# Patient Record
Sex: FEMALE | Race: WHITE | NOT HISPANIC OR LATINO | Employment: FULL TIME | URBAN - METROPOLITAN AREA
[De-identification: names, ages, dates, MRNs, and addresses within clinical notes are randomized per-mention and may not be internally consistent; named-entity substitution may affect disease eponyms.]

---

## 2018-01-02 ENCOUNTER — ALLSCRIPTS OFFICE VISIT (OUTPATIENT)
Dept: OTHER | Facility: OTHER | Age: 38
End: 2018-01-02

## 2018-01-03 NOTE — PROGRESS NOTES
Assessment   1  Encounter for routine gynecological examination (V72 31) (Z01 419)    Plan   Encounter for routine gynecological examination    · Follow-up visit in 1 year Evaluation and Treatment  Follow-up  Status: Hold For -    Scheduling  Requested for: 25GCY6451   Ordered; For: Encounter for routine gynecological examination; Ordered By: Brenda Rowell Performed:  Due: 81QPR3311    Discussion/Summary      #1  Yearly examâPap smear deferred, self breast awareness reviewed Contraceptionâpatient is status post Suzanne IUD, placed January 2015 by another gynecologist  She is very happy with this device, with only light spaced out menstrual periods  She would like to continue with IUD contraception  The Marquise Preston is due to be removed January 2018  She is interested in [place] IUD  She was counseled in detail about this  Of note, the IUD string was thought to be found in the endocervix but was difficult to visualize  She was counseled in detail about this  This may require some manipulation to remove the Suzanne before [place] is inserted  We will check for insurance company coverage and inform the patient of the findings and arrange for insertion in the near future  She was counseled to take ibuprofen prior to the procedure  History of oligomenorrhea-the patient notes light scattered menses on Suzanne  She will call or return with any menometrorrhagia  History of LEEP cone biopsy-done in 2004 for severe dysplasia  Pap with HPV testing was done December 2016 with negative findings  Pap was deferred with patient consent as per current guidelines  will follow-up in the near future for IUD removal/Re insertion  Otherwise, follow-up 1 year for yearly exam     The patient has the current Goals: Reviewed  The patent has the current Barriers: None  Patient is able to Self-Care  PATIENT EDUCATION RECORD She was given the following educational materials: [place] information       Possible side effects of new medications were reviewed with the patient/guardian today  The treatment plan was reviewed with the patient/guardian  The patient/guardian understands and agrees with the treatment plan      Chief Complaint   1  Visit For: Preventive General Multisystem Exam    History of Present Illness   HPI: Patient was seen today for yearly exam  Please see assessment and plan and discussion for details  Review of Systems        Constitutional: No fever, no chills, feels well, no tiredness, no recent weight gain or loss  ENT: no ear ache, no loss of hearing, no nosebleeds or nasal discharge, no sore throat or hoarseness  Cardiovascular: no complaints of slow or fast heart rate, no chest pain, no palpitations, no leg claudication or lower extremity edema  Respiratory: no complaints of shortness of breath, no wheezing, no dyspnea on exertion, no orthopnea or PND  Breasts: no complaints of breast pain, breast lump or nipple discharge  Gastrointestinal: no complaints of abdominal pain, no constipation, no nausea or diarrhea, no vomiting, no bloody stools  Genitourinary: no complaints of dysuria, no incontinence, no pelvic pain, no dysmenorrhea, no vaginal discharge or abnormal vaginal bleeding  Musculoskeletal: no complaints of arthralgia, no myalgia, no joint swelling or stiffness, no limb pain or swelling  Integumentary: no complaints of skin rash or lesion, no itching or dry skin, no skin wounds  Neurological: no complaints of headache, no confusion, no numbness or tingling, no dizziness or fainting  ROS reviewed  Active Problems   1  Acute pharyngitis (462) (J02 9)   2  Acute streptococcal pharyngitis (034 0) (J02 0)   3  Asthma (493 90) (J45 909)   4  Cervical cancer screening (V76 2) (Z12 4)   5  Encounter for routine gynecological examination with Papanicolaou smear of cervix     (V72 31,V76 2) (Z01 419)   6  History of allergy (V15 09) (Z88 9)   7   Denied: History of Patient Education - Self-Examination Of Breasts   8  Screening for HPV (human papillomavirus) (V73 81) (Z11 51)    Past Medical History    · History of Back Pain   · History of SANDEEP III (cervical intraepithelial neoplasia grade III) with severe dysplasia    (233 1) (D06 9)   · History of Encounter for screening for malignant neoplasm of cervix (V76 2) (Z12 4)   · History of Gestational Diabetes Mellitus - Antepartum Condition Or Prior Complicated    Delivery (648 83)   · History of  1 (V22 0) (Z34 00)   · History of allergic rhinitis (V12 69) (Z87 09)   · History of allergy (V15 09) (Z88 9)   · History of anemia (V12 3) (Z86 2)   · History of bronchitis (V12 69) (Z87 09)   · History of eczema (V13 3) (Z87 2)   · History of varicella (V12 09) (Z86 19)   · History of vertigo (V12 49) (Z87 898)   · History of Impacted cerumen of left ear (380 4) (H61 22)   · History of Impacted cerumen of right ear (380 4) (H61 21)   · History of Lump of skin (782 2) (R22 9)   · History of Need for prophylactic vaccination and inoculation against influenza (V04 81)    (Z23)   · History of Oligomenorrhea (626 1) (N91 5)   · History of Vaginal candidiasis (112 1) (B37 3)     The active problems and past medical history were reviewed and updated today  Surgical History    · History of Cervical Loop Electrosurgical Excision (LEEP)   · History of  Section Low Transverse   · History of Colposcopy Cervix With Biopsy(S) With Endocervical Curettage   · History of Foot Surgery   · History of Oral Surgery Tooth Extraction   · Denied: History of Patient Education - Self-Examination Of Breasts     The surgical history was reviewed and updated today         Family History   Father    · Family history of Hypertension (V17 49)   · Family history of Renal Failure  Maternal Grandmother    · Family history of Hypertension (V17 49)   · Family history of Lung Cancer (V16 1)  Paternal Grandmother    · Family history of Lung Cancer (V16 1)  Maternal Grandfather    · Family history of Bladder Cancer (V16 52)   · Family history of Hypertension (V17 49)  Paternal Grandfather    · Family history of Heart Disease (V17 49)  Family History    · Family history of Breast Cancer (V16 3)   · Family history of Cervical Cancer     The family history was reviewed and updated today  Social History    · Alcohol Use (History)   · Birth Control Method - Oral Contraceptives   · Caffeine Use   · Denied: History of Drug Use   · Marital History - Currently    · Never A Smoker   · One child   · [facility] ([address] [place] [address])   · Uses Safety Equipment - Seatbelts  The social history was reviewed and updated today  The social history was reviewed and is unchanged  Current Meds    1  Claritin TABS; Therapy: (Recorded:93Mgd4201) to Recorded   2  Nasonex 50 MCG/ACT Nasal Suspension; USE 2 SPRAYS IN EACH NOSTRIL ONCE     DAILY; Therapy: 34MZK3649 to (Evaluate:96Ebg3162)  Requested for: 00JQP3587; Last     Rx:17Jan2014 Ordered   3  Suzanne 13 5 MG Intrauterine Intrauterine Device; Therapy: (Recorded:50Roz5141) to Recorded   4  Ventolin  (90 Base) MCG/ACT Inhalation Aerosol Solution; INHALE 2 PUFFS     EVERY 4-6 HOURS AS NEEDED; Therapy: 89HKE0577 to ([facility])  Requested for: 41XPO8973; Last     Rx:10Mar2014 Ordered    Allergies   1  No Known Drug Allergies  2  No Known Food Allergies   3  Seasonal    Vitals    Recorded: 95ZOL8631 63:48BF   Systolic 102, LUE, Sitting   Diastolic 74, LUE, Sitting   Height 5 ft 4 5 in   Weight 181 lb    BMI Calculated 30 59   BSA Calculated 1 89   LMP IUD     Physical Exam        Constitutional      General appearance: No acute distress, well appearing and well nourished  Neck      Neck: Normal, supple, trachea midline, no masses  Thyroid: Normal, no thyromegaly  Genitourinary      External genitalia: Normal and no lesions appreciated         Vagina: Normal, no lesions or dryness appreciated  Urethra: Normal        Urethral meatus: Normal        Bladder: Normal, soft, non-tender and no prolapse or masses appreciated  Cervix: Normal, no palpable masses  -- Without lesions or discharge or cervicitis  No CMT IUD string barely seen in the endocervix  Uterus: Normal, non-tender, not enlarged, and no palpable masses  -- Mid position, top normal size, nontender, without mass  Adnexa/parametria: Normal, non-tender and no fullness or masses appreciated  Anus, perineum, and rectum: Normal sphincter tone, no masses, and no prolapse  Chest      Breasts: Normal and no dimpling or skin changes noted  Abdomen      Abdomen: Normal, non-tender, and no organomegaly noted  Liver and spleen: No hepatomegaly or splenomegaly  Examination for hernias: No hernias appreciated  Lymphatic      Palpation of lymph nodes in neck, axillae, groin and/or other locations: No lymphadenopathy or masses noted  Skin      Skin and subcutaneous tissue: Normal skin turgor and no rashes         Palpation of skin and subcutaneous tissue: Normal        Psychiatric      Orientation to person, place, and time: Normal        Mood and affect: Normal        Signatures    Electronically signed by : LYDIA Bustos ; Jan 2 2018  5:03PM EST                       (Author)

## 2018-01-23 VITALS
DIASTOLIC BLOOD PRESSURE: 74 MMHG | HEIGHT: 65 IN | WEIGHT: 181 LBS | BODY MASS INDEX: 30.16 KG/M2 | SYSTOLIC BLOOD PRESSURE: 122 MMHG

## 2018-02-23 ENCOUNTER — PROCEDURE VISIT (OUTPATIENT)
Dept: OBGYN CLINIC | Facility: CLINIC | Age: 38
End: 2018-02-23
Payer: COMMERCIAL

## 2018-02-23 ENCOUNTER — TELEPHONE (OUTPATIENT)
Dept: OBGYN CLINIC | Facility: CLINIC | Age: 38
End: 2018-02-23

## 2018-02-23 VITALS
HEIGHT: 65 IN | WEIGHT: 176.8 LBS | BODY MASS INDEX: 29.46 KG/M2 | DIASTOLIC BLOOD PRESSURE: 60 MMHG | SYSTOLIC BLOOD PRESSURE: 112 MMHG

## 2018-02-23 DIAGNOSIS — Z30.430 ENCOUNTER FOR INSERTION OF MIRENA IUD: ICD-10-CM

## 2018-02-23 DIAGNOSIS — Z30.433 ENCOUNTER FOR REMOVAL AND REINSERTION OF INTRAUTERINE CONTRACEPTIVE DEVICE (IUD): Primary | ICD-10-CM

## 2018-02-23 LAB — SL AMB POCT URINE HCG: NEGATIVE

## 2018-02-23 PROCEDURE — 81025 URINE PREGNANCY TEST: CPT | Performed by: OBSTETRICS & GYNECOLOGY

## 2018-02-23 PROCEDURE — 58301 REMOVE INTRAUTERINE DEVICE: CPT | Performed by: OBSTETRICS & GYNECOLOGY

## 2018-02-23 PROCEDURE — 58300 INSERT INTRAUTERINE DEVICE: CPT | Performed by: OBSTETRICS & GYNECOLOGY

## 2018-02-23 RX ORDER — LORATADINE 10 MG/1
TABLET ORAL
COMMUNITY
End: 2020-08-07 | Stop reason: ALTCHOICE

## 2018-02-23 RX ORDER — FLUTICASONE PROPIONATE 50 MCG
1 SPRAY, SUSPENSION (ML) NASAL DAILY
COMMUNITY

## 2018-02-23 RX ORDER — MOMETASONE FUROATE 50 UG/1
2 SPRAY, METERED NASAL DAILY
COMMUNITY
Start: 2012-12-26 | End: 2019-09-05 | Stop reason: SDUPTHER

## 2018-02-23 NOTE — PROGRESS NOTES
Assessment/Plan:   1  Alexis removal with Cisneros Line Re insertion-done without problems  Patient tolerated the procedure well  Urine pregnancy test was negative prior to the procedure  Pelvic rest was recommended for the next 7 days  She will follow up in 1 month time for IUD check  Diagnoses and all orders for this visit:    Encounter for removal and reinsertion of intrauterine contraceptive device (IUD)  -     levonorgestrel (KYLEENA) 19 5 MG intrauterine device; 1 Intra Uterine Device by Intrauterine route once for 1 dose  -     Iud removal    Encounter for insertion of mirena IUD  -     POCT urine HCG  -     levonorgestrel (KYLEENA) 19 5 MG intrauterine device; 1 Intra Uterine Device by Intrauterine route once for 1 dose  -     Iud insertions    Other orders  -     loratadine (CLARITIN) 10 mg tablet; Take by mouth  -     mometasone (NASONEX) 50 mcg/act nasal spray; 2 sprays into each nostril daily  -     Discontinue: Levonorgestrel (ALEXIS) 13 5 MG IUD; by Intrauterine route  -     fluticasone (FLONASE) 50 mcg/act nasal spray; 1 spray into each nostril daily  -     dextromethorphan-guaifenesin (MUCINEX DM)  MG per 12 hr tablet; Take 1 tablet by mouth every 12 (twelve) hours  -     Cancel: levonorgestrel [facility]) intrauterine device IUD 1 Intra Uterine Device; 1 Intra Uterine Device by Intrauterine route once           Subjective:     Patient ID: Gagandeep Su is a 43 y o  female  Patient was seen today for IUD removal with Re insertion  She has  Alexis IUD and wishes to have Cisneros Line placed  Review of Systems   Constitutional: Negative for chills, diaphoresis, fatigue and fever  Respiratory: Negative for apnea, cough, chest tightness, shortness of breath and wheezing  Cardiovascular: Negative for chest pain, palpitations and leg swelling  Gastrointestinal: Negative for abdominal distention, abdominal pain, anal bleeding, constipation, diarrhea, nausea, rectal pain and vomiting  Genitourinary: Negative for difficulty urinating, dyspareunia, dysuria, frequency, hematuria, menstrual problem, pelvic pain, urgency, vaginal bleeding, vaginal discharge and vaginal pain  Musculoskeletal: Negative for arthralgias, back pain and myalgias  Skin: Negative for color change and rash  Neurological: Negative for dizziness, syncope, light-headedness, numbness and headaches  Hematological: Negative for adenopathy  Does not bruise/bleed easily  Psychiatric/Behavioral: Negative for dysphoric mood and sleep disturbance  The patient is not nervous/anxious  Objective:     Physical Exam    Objective      /60 (BP Location: Right arm, Patient Position: Sitting, Cuff Size: Large)   Ht 5' 5" (1 651 m)   Wt 80 2 kg (176 lb 12 8 oz)   BMI 29 42 kg/m²     General:   alert and oriented, in no acute distress, alert, appears stated age and cooperative   Neck:    Breast:    Heart:    Lungs:    Abdomen: soft, non-tender, without masses or organomegaly   Vulva: normal   Vagina: Without lesionsOr discharge noted   Cervix: Without lesions or discharge or cervicitis    No Cervical motion tenderness   Uterus: top normal size, anteverted   Adnexa: normal adnexa   Rectum:

## 2018-02-23 NOTE — PROGRESS NOTES
Iud insertions  Date/Time: 2/23/2018 11:32 AM  Performed by: Martina Kyle  Authorized by: Martina Kyle     Consent:     Consent obtained:  Verbal and written    Consent given by:  Patient    Procedure risks and benefits discussed: yes      Patient questions answered: yes      Patient agrees, verbalizes understanding, and wants to proceed: yes      Educational handouts given: yes      Instructions and paperwork completed: yes    Procedure:     Pelvic exam performed: yes      Negative urine pregnancy test: yes      Cervix cleaned and prepped: yes      Speculum placed in vagina: yes      Tenaculum applied to cervix: yes      IUD inserted with no complications: yes      Strings trimmed: yes      Uterus sounded to confirm IUD placement: yes      Uterus sound depth (cm):  8  Post-procedure:     Patient tolerated procedure well: yes      Patient will follow up after next period: yes    Comments:      Meghann Tripp IUD inserted without problems  IUD string cut to a length of 2 5-3 cm     On exam, cervix was without lesions or discharge  Vagina without lesions or discharge  Uterus was anteverted, top-normal size, nontender, without mass  Adnexa without masses or tenderness    Rectal exam was deferred

## 2018-02-23 NOTE — PROGRESS NOTES
Iud removal  Date/Time: 2018 11:31 AM  Performed by: Lu zElena Xavier  Authorized by: Luz Elena Xavier     Consent:     Consent obtained:  Verbal and written    Consent given by:  Patient    Procedure risks and benefits discussed: yes      Patient questions answered: yes      Patient agrees, verbalizes understanding, and wants to proceed: yes      Educational handouts given: yes      Instructions and paperwork completed: yes    Procedure:     Removed with no complications: yes      Other reason for removal:   IUD

## 2018-03-23 ENCOUNTER — OFFICE VISIT (OUTPATIENT)
Dept: OBGYN CLINIC | Facility: CLINIC | Age: 38
End: 2018-03-23
Payer: COMMERCIAL

## 2018-03-23 VITALS
HEIGHT: 64 IN | BODY MASS INDEX: 29.84 KG/M2 | DIASTOLIC BLOOD PRESSURE: 76 MMHG | WEIGHT: 174.8 LBS | SYSTOLIC BLOOD PRESSURE: 118 MMHG

## 2018-03-23 DIAGNOSIS — N92.6 IRREGULAR MENSES: Primary | ICD-10-CM

## 2018-03-23 DIAGNOSIS — Z97.5 IUD CONTRACEPTION: ICD-10-CM

## 2018-03-23 PROBLEM — H61.23 HEARING LOSS SECONDARY TO CERUMEN IMPACTION, BILATERAL: Status: ACTIVE | Noted: 2017-08-08

## 2018-03-23 PROBLEM — H61.23 HEARING LOSS SECONDARY TO CERUMEN IMPACTION, BILATERAL: Status: RESOLVED | Noted: 2017-08-08 | Resolved: 2018-03-23

## 2018-03-23 PROCEDURE — 99213 OFFICE O/P EST LOW 20 MIN: CPT | Performed by: OBSTETRICS & GYNECOLOGY

## 2018-03-23 NOTE — PROGRESS NOTES
Assessment/Plan:  1  IUD check-Kyleena was placed  Patient had dizziness and lethargy for 1-2 weeks but this has resolved  She will call or return with any recurrent symptoms  Of note, Katherine Lincoln was removed at the previous visit  2   History of oligomenorrhea-this may persist on [place]  She will call with any issues  3   History of abnormal Pap-patient status post LEEP cone biopsy for severe dysplasia in 2004  Most recent Pap December 2016 was negative with negative HPV  4   Transient irregular menses-likely related to recent IUD removal with Re insertion  She will call with any menometrorrhagia  She will follow-up in December 2018 for yearly exam or as needed  No problem-specific Assessment & Plan notes found for this encounter  Diagnoses and all orders for this visit:    Irregular menses    IUD contraception    Other orders  -     levonorgestrel (KYLEENA) 19 5 MG intrauterine device; 1 Intra Uterine Device by Intrauterine route once          Subjective:      Patient ID: Xiao Mahmood is a 43 y o  female  Patient was seen today for IUD check  She denies any complaints  She had some irregular bleeding when it was initially inserted and some dizziness and lethargy for the 1st week or 2 but this has all resolved  The following portions of the patient's history were reviewed and updated as appropriate: allergies, current medications, past family history, past medical history, past social history, past surgical history and problem list     Review of Systems   Constitutional: Negative for chills, diaphoresis, fatigue and fever  Respiratory: Negative for apnea, cough, chest tightness, shortness of breath and wheezing  Cardiovascular: Negative for chest pain, palpitations and leg swelling  Gastrointestinal: Negative for abdominal distention, abdominal pain, anal bleeding, constipation, diarrhea, nausea, rectal pain and vomiting     Genitourinary: Negative for difficulty urinating, dyspareunia, dysuria, frequency, hematuria, menstrual problem, pelvic pain, urgency, vaginal bleeding, vaginal discharge and vaginal pain  Musculoskeletal: Negative for arthralgias, back pain and myalgias  Skin: Negative for color change and rash  Neurological: Negative for dizziness, syncope, light-headedness, numbness and headaches  Hematological: Negative for adenopathy  Does not bruise/bleed easily  Psychiatric/Behavioral: Negative for dysphoric mood and sleep disturbance  The patient is not nervous/anxious  Objective:      /76 (BP Location: Right arm, Patient Position: Sitting, Cuff Size: Standard)    5' 4 07" (1 628 m)   Wt 79 3 kg (174 lb 12 8 oz)   BMI 29 93 kg/m²          Physical Exam    Objective      /76 (BP Location: Right arm, Patient Position: Sitting, Cuff Size: Standard)   Ht 5' 4 07" (1 628 m)   Wt 79 3 kg (174 lb 12 8 oz)   BMI 29 93 kg/m²     General:   alert and oriented, in no acute distress, alert, appears stated age and cooperative   Neck:    Breast:    Heart:    Lungs:    Abdomen: soft, non-tender, without masses or organomegaly   Vulva: normal   Vagina: Without lesions or discharge noted  Cervix: Without lesions or discharge or cervicitis  No Cervical motion tenderness    IUD string seen at a length of 3 cm   Uterus: top normal size, anteverted, non-tender   Adnexa: no mass, fullness, tenderness   Rectum: deferred

## 2019-01-21 ENCOUNTER — ANNUAL EXAM (OUTPATIENT)
Dept: OBGYN CLINIC | Facility: CLINIC | Age: 39
End: 2019-01-21
Payer: COMMERCIAL

## 2019-01-21 VITALS
HEIGHT: 65 IN | BODY MASS INDEX: 30.16 KG/M2 | SYSTOLIC BLOOD PRESSURE: 118 MMHG | DIASTOLIC BLOOD PRESSURE: 72 MMHG | WEIGHT: 181 LBS

## 2019-01-21 DIAGNOSIS — Z01.419 WOMEN'S ANNUAL ROUTINE GYNECOLOGICAL EXAMINATION: Primary | ICD-10-CM

## 2019-01-21 DIAGNOSIS — Z12.31 VISIT FOR SCREENING MAMMOGRAM: ICD-10-CM

## 2019-01-21 PROBLEM — M54.16 LUMBAR RADICULOPATHY, CHRONIC: Status: ACTIVE | Noted: 2018-07-26

## 2019-01-21 PROCEDURE — 99395 PREV VISIT EST AGE 18-39: CPT | Performed by: OBSTETRICS & GYNECOLOGY

## 2019-01-21 RX ORDER — NAPROXEN 500 MG/1
TABLET ORAL
COMMUNITY
Start: 2018-08-08 | End: 2019-12-03 | Stop reason: ALTCHOICE

## 2019-01-21 RX ORDER — RANITIDINE HCL 75 MG
75 TABLET ORAL 2 TIMES DAILY
COMMUNITY
End: 2019-12-03 | Stop reason: ALTCHOICE

## 2019-01-21 NOTE — PATIENT INSTRUCTIONS
Intrauterine Device   WHAT YOU NEED TO KNOW:   What is an intrauterine device? An intrauterine device (IUD) is a type of birth control that is inserted into your uterus  It is a small, flexible piece of plastic with a string on the end  It is inserted and removed by your healthcare provider  IUDs prevent sperm from reaching or fertilizing an egg  IUDs also prevent a fertilized egg from attaching to the uterus and developing into a fetus  What are the most common types of IUDs? · Copper: This type of IUD slowly releases a small amount of copper into your uterus  This IUD can remain in place for up to 10 years  · Hormone-releasing: This type of IUD slowly releases a small amount of progesterone into your uterus  Progesterone is a hormone that is made by your body to help control your periods  This IUD can remain in place for up to 5 years  What are the advantages of an IUD? · Effective: An IUD is 98% to 99% effective in preventing pregnancy  · Easily removable: The IUD can be removed if you decide to have a baby  You may be able to get pregnant as soon as the IUD is removed  · Immediate:  An IUD protects you from pregnancy right after it is inserted  · Convenient:  You do not have to stop sexual activity to insert it or worry about remembering to take your birth control pill  · Safe:  Copper IUDs are safer for some women than oral birth control pills  Examples include women who smoke or have a history of blood clots  · Health effects:  Hormone-releasing IUDs may decrease certain health problems  Examples include bleeding and cramping that happen with your monthly period  What are the risks of an IUD? · An IUD does not protect you from sexually transmitted infections  You may have cramps during the first weeks after you get the IUD  A copper IUD may cause your monthly period to be heavier or more painful  This is more common within the first 3 months after you get the IUD   You may need to have your IUD removed if your bleeding or pain becomes severe  You may have spotting between periods  · There is a small chance that you could get pregnant  Sometimes the IUD cannot be removed after you get pregnant  This increases your risk of a miscarriage or an ectopic pregnancy  Ectopic pregnancy is when the fertilized egg starts to grow somewhere other than your uterus  There is a small risk of an infection within the first 20 days after the IUD is placed  Infection can lead to pelvic inflammatory disease  This can cause infertility  Your uterus may tear when the IUD is inserted  The IUD may slip part or all of the way out of your uterus  How is the IUD inserted? · The IUD is usually inserted during your monthly period  This may help decrease the amount of discomfort you have during the procedure  It also helps ensure that you are not pregnant  You will be asked to lie down and place your feet in stirrups  Your healthcare provider will gently insert a speculum into your vagina  This is the same tool used during a pap smear  The speculum allows your healthcare provider to see inside your vagina to your cervix  The cervix is the opening of your uterus  · Your healthcare provider will clean your cervix with an antiseptic solution to prevent infection  You may be given numbing medicine  A long plastic tube is gently passed through your cervix and into your uterus  This tube has the IUD inside of it  The IUD is pushed out of the tube and into your uterus  You may have cramps as the IUD is inserted  The tube is removed after the IUD is in place  How can I make sure my IUD is still in place? An IUD has a string made of plastic thread  One to 2 inches of this string hangs into your vagina  You cannot see this string, and it will not cause problems when you have sex  Check your IUD string every 3 days for the first 3 months after it is inserted  After that, check the string after each monthly period   Do the following to check the placement of your IUD:  · Wash your hands with soap and warm water  Dry them with a clean towel  · Bend your knees and squat low to the ground  · Gently put your index finger inside your vagina  The cervix is at the top of the vagina and feels like the tip of your nose  Feel for the IUD string  Do not pull on the string  You should not be able to feel the firm plastic of the IUD itself  · Wash your hands after you check your IUD string  Where can I find more information? · [facility] [address] , [address] [place]  Phone: 4- 965 - [phone]  Web Address: https://[employer]/  org  When should I contact my healthcare provider? · You think you are pregnant  · You bleed after you have sex  · You have pain during sex  · You cannot feel the IUD string, the string feels longer, or you feel the plastic of the IUD itself  · You have vaginal discharge that is green, yellow, or has a foul odor  · You have questions or concerns about your condition or care  When should I seek immediate care? · You have severe pain or bleeding during your period  · You have a fever and severe abdominal pain  CARE AGREEMENT:   You have the right to help plan your care  Learn about your health condition and how it may be treated  Discuss treatment options with your caregivers to decide what care you want to receive  You always have the right to refuse treatment  The above information is an  only  It is not intended as medical advice for individual conditions or treatments  Talk to your doctor, nurse or pharmacist before following any medical regimen to see if it is safe and effective for you  © 2017 [employer] Information is for End User's use only and may not be sold, redistributed or otherwise used for commercial purposes   All illustrations and images included in CareNotes® are the copyrighted property of A D A M , Inc  or Lester Ortiz

## 2019-01-21 NOTE — PROGRESS NOTES
Assessment/Plan   Diagnoses and all orders for this visit:    Women's annual routine gynecological examination    Visit for screening mammogram  -     Mammo screening bilateral w cad; Future    Other orders  -     naproxen (NAPROSYN) 500 mg tablet; TAKE 1 TABLET (500 MG TOTAL) BY MOUTH 2 (TWO) TIMES A DAY WITH MEALS  TAKE WITH MEALS  -     ranitidine (ZANTAC) 75 MG tablet; Take 75 mg by mouth 2 (two) times a day     1  Yearly exam-Pap smear deferred, self-breast awareness reviewed, calcium/vitamin-D recommendations reviewed, mammogram request given postdated 2/8/19  2  Jimy Shutters good position on the exam   Patient does not check for the IUD string  She will call with any issues  Of note, IUD string seen at a length of 1-1 5 cm   3  Oligomenorrhea/amenorrhea-patient notes rare menstrual flow on the IUD  4  History of abnormal Pap-status post LEEP cone biopsy for severe dysplasia in 2004  Pap with HPV was negative December 2016  Pap was deferred today with plans to have it done at the next visit January 2020  She will follow-up in 1 year for yearly exam with Pap/HPV or as needed  Subjective   Patient ID: Brooke Ga is a 43 y o  female  Vitals:    01/21/19 1626   BP: 118/72     Patient was seen today for yearly exam   She denies any complaints  The following portions of the patient's history were reviewed and updated as appropriate: allergies, current medications, past family history, past medical history, past social history, past surgical history and problem list   Past Medical History:   Diagnosis Date    Abnormal Pap smear of cervix     Allergic rhinitis     last assessed-12/31/2012    Anemia     SANDEEP III (cervical intraepithelial neoplasia grade III) with severe dysplasia     LEEP cone biopsy for SANDEEP-3 2/25/04    Done at the hospital    Gestational diabetes     antepartum condition or prior complicated delivery    Impacted cerumen of both ears     last assessed-6/13/2012    Oligomenorrhea     Vaginal candidiasis     last assessed-2013    Varicella     chickenpox as a child    Vertigo     last assessed-2013     Past Surgical History:   Procedure Laterality Date    CERVICAL BIOPSY  W/ LOOP ELECTRODE EXCISION  2004    Pathololgy with SANDEEP-2 to 3, with free margins but dysplasia extended to within 1mm of endocervical margin  2004     SECTION, LOW TRANSVERSE  2009    Primary low transverse  for arrest in the active phase at 4 cm with unengaged vertex despite hours of labor    COLPOSCOPY W/ BIOPSY / CURETTAGE  2003    FOOT SURGERY Right     TOOTH EXTRACTION       OB History    Para Term  AB Living   1 1 1     1   SAB TAB Ectopic Multiple Live Births           1      # Outcome Date GA Lbr Gregory/2nd Weight Sex Delivery Anes PTL Lv   1 Term               Obstetric Comments    1       Current Outpatient Prescriptions:     dextromethorphan-guaifenesin (MUCINEX DM)  MG per 12 hr tablet, Take 1 tablet by mouth every 12 (twelve) hours, Disp: , Rfl:     fluticasone (FLONASE) 50 mcg/act nasal spray, 1 spray into each nostril daily, Disp: , Rfl:     levonorgestrel (KYLEENA) 19 5 MG intrauterine device, 1 Intra Uterine Device by Intrauterine route once, Disp: , Rfl:     loratadine (CLARITIN) 10 mg tablet, Take by mouth, Disp: , Rfl:     mometasone (NASONEX) 50 mcg/act nasal spray, 2 sprays into each nostril daily, Disp: , Rfl:     naproxen (NAPROSYN) 500 mg tablet, TAKE 1 TABLET (500 MG TOTAL) BY MOUTH 2 (TWO) TIMES A DAY WITH MEALS   TAKE WITH MEALS , Disp: , Rfl:     ranitidine (ZANTAC) 75 MG tablet, Take 75 mg by mouth 2 (two) times a day, Disp: , Rfl:     levonorgestrel (KYLEENA) 19 5 MG intrauterine device, 1 Intra Uterine Device by Intrauterine route once for 1 dose, Disp: 1 Intra Uterine Device, Rfl: 0  Allergies   Allergen Reactions    Other      Social History     Social History    Marital status: /Civil [employer]     Spouse name: N/A    Number of children: 1    Years of education: N/A     Social History Main Topics    Smoking status: Never Smoker    Smokeless tobacco: Never Used    Alcohol use Yes    Drug use: No    Sexual activity: Yes     Partners: Male     Birth control/ protection: IUD     Other Topics Concern    None     Social History Narrative    Caffeine use    Confucianism Affiliation [employer] ([facility])    Uses safety equipment-seatbelts     Family History   Problem Relation Age of Onset    Hypertension Father     Kidney failure Father     Hypertension Maternal Grandmother     Lung cancer Maternal Grandmother     Cancer Maternal Grandfather         bladder    Hypertension Maternal Grandfather     Lung cancer Paternal Grandmother     Heart disease Paternal Grandfather     Breast cancer Family     Cervical cancer Family        Review of Systems   Constitutional: Negative for chills, diaphoresis, fatigue and fever  Respiratory: Negative for apnea, cough, chest tightness, shortness of breath and wheezing  Cardiovascular: Negative for chest pain, palpitations and leg swelling  Gastrointestinal: Negative for abdominal distention, abdominal pain, anal bleeding, constipation, diarrhea, nausea, rectal pain and vomiting  Genitourinary: Negative for difficulty urinating, dyspareunia, dysuria, frequency, hematuria, menstrual problem, pelvic pain, urgency, vaginal bleeding, vaginal discharge and vaginal pain  Musculoskeletal: Negative for arthralgias, back pain and myalgias  Skin: Negative for color change and rash  Neurological: Negative for dizziness, syncope, light-headedness, numbness and headaches  Hematological: Negative for adenopathy  Does not bruise/bleed easily  Psychiatric/Behavioral: Negative for dysphoric mood and sleep disturbance  The patient is not nervous/anxious          Objective   Physical Exam    Objective      /72 (BP Location: Left arm, Patient Position: Sitting, Cuff Size: Standard)   Ht 5' 5" (1 651 m)   Wt 82 1 kg (181 lb)   BMI 30 12 kg/m²     General:   alert and oriented, in no acute distress   Neck: normal to inspection and palpation   Breast: normal appearance, no masses or tenderness   Heart:    Lungs:    Abdomen: soft, non-tender, without masses or organomegaly   Vulva: normal   Vagina: Without erythema or lesions or discharge  Normal   Cervix: Without lesions or discharge or cervicitis  No Cervical motion tenderness  IUD string seen at a length of 1-1 5 cm      Uterus: top normal size, anteverted, non-tender   Adnexa: no mass, fullness, tenderness   Rectum: negative    Psych:  Normal mood and affect   Skin:  Without obvious lesions   Eyes: symmetric, with normal movements and reactivity   Musculoskeletal:  Normal muscle tone and movements appreciated

## 2019-02-22 ENCOUNTER — APPOINTMENT (OUTPATIENT)
Dept: URGENT CARE | Facility: MEDICAL CENTER | Age: 39
End: 2019-02-22

## 2019-02-22 ENCOUNTER — TRANSCRIBE ORDERS (OUTPATIENT)
Dept: URGENT CARE | Facility: MEDICAL CENTER | Age: 39
End: 2019-02-22

## 2019-02-22 ENCOUNTER — TELEPHONE (OUTPATIENT)
Dept: OBGYN CLINIC | Facility: CLINIC | Age: 39
End: 2019-02-22

## 2019-02-22 DIAGNOSIS — Z00.00 PHYSICAL EXAM: Primary | ICD-10-CM

## 2019-02-22 LAB
HBV SURFACE AB SER-ACNC: 27.37 MIU/ML
RUBV IGG SERPL IA-ACNC: >175 IU/ML

## 2019-02-22 PROCEDURE — 86765 RUBEOLA ANTIBODY: CPT | Performed by: FAMILY MEDICINE

## 2019-02-22 PROCEDURE — 86735 MUMPS ANTIBODY: CPT | Performed by: FAMILY MEDICINE

## 2019-02-22 PROCEDURE — 86787 VARICELLA-ZOSTER ANTIBODY: CPT | Performed by: FAMILY MEDICINE

## 2019-02-22 PROCEDURE — 86480 TB TEST CELL IMMUN MEASURE: CPT | Performed by: FAMILY MEDICINE

## 2019-02-22 PROCEDURE — 86706 HEP B SURFACE ANTIBODY: CPT | Performed by: FAMILY MEDICINE

## 2019-02-22 PROCEDURE — 86762 RUBELLA ANTIBODY: CPT | Performed by: FAMILY MEDICINE

## 2019-02-24 LAB — MUV IGG SER QL: NORMAL

## 2019-02-25 LAB
GAMMA INTERFERON BACKGROUND BLD IA-ACNC: 0.02 IU/ML
M TB IFN-G BLD-IMP: NEGATIVE
M TB IFN-G CD4+ BCKGRND COR BLD-ACNC: 0 IU/ML
M TB IFN-G CD4+ BCKGRND COR BLD-ACNC: 0 IU/ML
MITOGEN IGNF BCKGRD COR BLD-ACNC: >10 IU/ML

## 2019-02-26 LAB
MEV IGG SER QL: NORMAL
VZV IGG SER IA-ACNC: NORMAL

## 2019-04-23 ENCOUNTER — TELEPHONE (OUTPATIENT)
Dept: OBGYN CLINIC | Facility: CLINIC | Age: 39
End: 2019-04-23

## 2019-05-10 ENCOUNTER — HOSPITAL ENCOUNTER (OUTPATIENT)
Dept: RADIOLOGY | Age: 39
Discharge: HOME | End: 2019-05-10
Payer: COMMERCIAL

## 2019-05-10 VITALS — HEIGHT: 64 IN | BODY MASS INDEX: 29.88 KG/M2 | WEIGHT: 175 LBS

## 2019-05-10 DIAGNOSIS — Z12.31 VISIT FOR SCREENING MAMMOGRAM: ICD-10-CM

## 2019-05-10 PROCEDURE — 77067 SCR MAMMO BI INCL CAD: CPT

## 2019-05-16 ENCOUNTER — TELEPHONE (OUTPATIENT)
Dept: OBGYN CLINIC | Facility: CLINIC | Age: 39
End: 2019-05-16

## 2019-05-17 DIAGNOSIS — N63.20 BREAST MASS, LEFT: Primary | ICD-10-CM

## 2019-05-28 ENCOUNTER — HOSPITAL ENCOUNTER (OUTPATIENT)
Dept: MAMMOGRAPHY | Facility: CLINIC | Age: 39
Discharge: HOME | End: 2019-05-28
Payer: COMMERCIAL

## 2019-05-28 ENCOUNTER — HOSPITAL ENCOUNTER (OUTPATIENT)
Dept: ULTRASOUND IMAGING | Facility: CLINIC | Age: 39
Discharge: HOME | End: 2019-05-28
Payer: COMMERCIAL

## 2019-05-28 VITALS — WEIGHT: 175 LBS | BODY MASS INDEX: 29.88 KG/M2 | HEIGHT: 64 IN

## 2019-05-28 DIAGNOSIS — N63.20 BREAST MASS, LEFT: ICD-10-CM

## 2019-05-28 PROCEDURE — 77065 DX MAMMO INCL CAD UNI: CPT

## 2019-05-28 PROCEDURE — 76642 ULTRASOUND BREAST LIMITED: CPT

## 2019-05-28 PROCEDURE — G0279 TOMOSYNTHESIS, MAMMO: HCPCS

## 2019-06-14 ENCOUNTER — OFFICE VISIT (OUTPATIENT)
Dept: FAMILY MEDICINE CLINIC | Facility: CLINIC | Age: 39
End: 2019-06-14
Payer: COMMERCIAL

## 2019-06-14 VITALS
DIASTOLIC BLOOD PRESSURE: 70 MMHG | RESPIRATION RATE: 16 BRPM | SYSTOLIC BLOOD PRESSURE: 102 MMHG | HEIGHT: 65 IN | OXYGEN SATURATION: 99 % | WEIGHT: 179 LBS | BODY MASS INDEX: 29.82 KG/M2 | TEMPERATURE: 97.2 F | HEART RATE: 62 BPM

## 2019-06-14 DIAGNOSIS — Z23 NEED FOR TDAP VACCINATION: ICD-10-CM

## 2019-06-14 DIAGNOSIS — Z76.89 ENCOUNTER TO ESTABLISH CARE WITH NEW DOCTOR: ICD-10-CM

## 2019-06-14 DIAGNOSIS — Z00.00 ROUTINE ADULT HEALTH MAINTENANCE: Primary | ICD-10-CM

## 2019-06-14 DIAGNOSIS — M48.061 SPINAL STENOSIS OF LUMBAR REGION WITHOUT NEUROGENIC CLAUDICATION: ICD-10-CM

## 2019-06-14 PROBLEM — N92.6 IRREGULAR MENSES: Status: RESOLVED | Noted: 2018-03-23 | Resolved: 2019-06-14

## 2019-06-14 PROBLEM — M54.16 LUMBAR RADICULOPATHY, CHRONIC: Status: RESOLVED | Noted: 2018-07-26 | Resolved: 2019-06-14

## 2019-06-14 PROCEDURE — 99386 PREV VISIT NEW AGE 40-64: CPT | Performed by: FAMILY MEDICINE

## 2019-06-14 PROCEDURE — 90715 TDAP VACCINE 7 YRS/> IM: CPT

## 2019-06-14 PROCEDURE — 90471 IMMUNIZATION ADMIN: CPT

## 2019-08-03 ENCOUNTER — APPOINTMENT (OUTPATIENT)
Dept: LAB | Facility: MEDICAL CENTER | Age: 39
End: 2019-08-03

## 2019-08-03 ENCOUNTER — TRANSCRIBE ORDERS (OUTPATIENT)
Dept: ADMINISTRATIVE | Facility: HOSPITAL | Age: 39
End: 2019-08-03

## 2019-08-03 DIAGNOSIS — Z00.8 OTHER SPECIFIED GENERAL MEDICAL EXAMINATION: Primary | ICD-10-CM

## 2019-08-03 DIAGNOSIS — Z00.8 OTHER SPECIFIED GENERAL MEDICAL EXAMINATION: ICD-10-CM

## 2019-08-03 LAB
CHOLEST SERPL-MCNC: 144 MG/DL (ref 50–200)
EST. AVERAGE GLUCOSE BLD GHB EST-MCNC: 103 MG/DL
HBA1C MFR BLD: 5.2 % (ref 4.2–6.3)
HDLC SERPL-MCNC: 62 MG/DL (ref 40–60)
LDLC SERPL CALC-MCNC: 73 MG/DL (ref 0–100)
NONHDLC SERPL-MCNC: 82 MG/DL
TRIGL SERPL-MCNC: 44 MG/DL

## 2019-08-03 PROCEDURE — 36415 COLL VENOUS BLD VENIPUNCTURE: CPT | Performed by: PREVENTIVE MEDICINE

## 2019-08-03 PROCEDURE — 80061 LIPID PANEL: CPT

## 2019-08-03 PROCEDURE — 83036 HEMOGLOBIN GLYCOSYLATED A1C: CPT | Performed by: PREVENTIVE MEDICINE

## 2019-09-05 ENCOUNTER — APPOINTMENT (OUTPATIENT)
Dept: RADIOLOGY | Facility: CLINIC | Age: 39
End: 2019-09-05
Payer: COMMERCIAL

## 2019-09-05 ENCOUNTER — OFFICE VISIT (OUTPATIENT)
Dept: OBGYN CLINIC | Facility: CLINIC | Age: 39
End: 2019-09-05
Payer: COMMERCIAL

## 2019-09-05 VITALS
HEART RATE: 66 BPM | HEIGHT: 64 IN | WEIGHT: 177 LBS | BODY MASS INDEX: 30.22 KG/M2 | DIASTOLIC BLOOD PRESSURE: 69 MMHG | SYSTOLIC BLOOD PRESSURE: 102 MMHG

## 2019-09-05 DIAGNOSIS — M79.672 PAIN OF LEFT HEEL: Primary | ICD-10-CM

## 2019-09-05 DIAGNOSIS — M79.672 PAIN OF LEFT HEEL: ICD-10-CM

## 2019-09-05 PROCEDURE — 73650 X-RAY EXAM OF HEEL: CPT

## 2019-09-05 PROCEDURE — 99203 OFFICE O/P NEW LOW 30 MIN: CPT | Performed by: ORTHOPAEDIC SURGERY

## 2019-09-26 ENCOUNTER — OFFICE VISIT (OUTPATIENT)
Dept: OBGYN CLINIC | Facility: CLINIC | Age: 39
End: 2019-09-26
Payer: COMMERCIAL

## 2019-09-26 VITALS
WEIGHT: 177 LBS | SYSTOLIC BLOOD PRESSURE: 112 MMHG | HEART RATE: 65 BPM | HEIGHT: 64 IN | DIASTOLIC BLOOD PRESSURE: 74 MMHG | BODY MASS INDEX: 30.22 KG/M2

## 2019-09-26 DIAGNOSIS — M79.672 PAIN OF LEFT HEEL: Primary | ICD-10-CM

## 2019-09-26 PROCEDURE — 99213 OFFICE O/P EST LOW 20 MIN: CPT | Performed by: ORTHOPAEDIC SURGERY

## 2019-09-26 NOTE — PROGRESS NOTES
Assessment/Plan:  1  Pain of left heel  MRI ankle/heel left  wo contrast       Scribe Attestation    I,:   Khalida Whalen am acting as a scribe while in the presence of the attending physician :        I,:   Barbie Rowland, DO personally performed the services described in this documentation    as scribed in my presence :          Karine has continued left sided heel pain  Due to her continued pain with no improvement of rest I would like to order an MRI of the left heel / ankle to rule out a stress fracture of the heel vs Achilles insertion  I did offer her a Cam walker boot at today's appointment but she would like to continue with the heel cups until we have the results of the MRI  We discussed that she should restrict herself to non-impact exercises  She can use the stationary bike, swim, upper body and core as tolerated  I will call her with the results of the MRI and at that time we will discuss what the next appropriate treatment will be  Subjective:   Lamont Do is a 35 y o  female who returns to the office today for follow-up left heel pain  She was last seen 3 weeks ago where she was suffering from heel pain for about 1 month  She was treated conservatively and given heel cups  She states at today's appointment that her pain has not improved and she has continued pain in her heel and into her Achilles  She has been wearing the heel cups which helped with her daily walking but has not improved her overall pain  She states she has been doing very low impact exercises but was feeling better about a week ago so she tried participating in a grady class  By the end of her Grady class she had significant heel pain  She denies any new injury or trauma to the hell  She denies any radiating pain, numbness or tingling  She has been doing very low impact activity but did tries simple class about a week ago while she was feeling better                                    Review of Systems   Constitutional: Negative for chills, fever and unexpected weight change  HENT: Negative for hearing loss, nosebleeds and sore throat  Eyes: Negative for pain, redness and visual disturbance  Respiratory: Negative for cough, shortness of breath and wheezing  Cardiovascular: Negative for chest pain, palpitations and leg swelling  Gastrointestinal: Negative for abdominal pain, nausea and vomiting  Endocrine: Negative for polydipsia and polyuria  Genitourinary: Negative for dysuria and hematuria  Musculoskeletal: Positive for myalgias  See HPI   Skin: Negative for rash and wound  Neurological: Negative for dizziness, numbness and headaches  Psychiatric/Behavioral: Negative for decreased concentration and suicidal ideas  The patient is not nervous/anxious  Past Medical History:   Diagnosis Date    Abnormal Pap smear of cervix     Allergic rhinitis     last assessed-2012    Anemia     SANDEEP III (cervical intraepithelial neoplasia grade III) with severe dysplasia     LEEP cone biopsy for SANDEEP-3 04  Done at the hospital    Gestational diabetes     antepartum condition or prior complicated delivery    Impacted cerumen of both ears     last assessed-2012    Oligomenorrhea     Vaginal candidiasis     last assessed-2013    Varicella     chickenpox as a child    Vertigo     last assessed-2013       Past Surgical History:   Procedure Laterality Date    CERVICAL BIOPSY  W/ LOOP ELECTRODE EXCISION  2004    Pathololgy with SANDEEP-2 to 3, with free margins but dysplasia extended to within 1mm of endocervical margin    2004     SECTION, LOW TRANSVERSE  2009    Primary low transverse  for arrest in the active phase at 4 cm with unengaged vertex despite hours of labor    COLPOSCOPY W/ BIOPSY / CURETTAGE  2003    FOOT SURGERY Right     TOOTH EXTRACTION         Family History   Problem Relation Age of Onset    Hypertension Father    Mina Kidney failure Father     Hypertension Maternal Grandmother     Lung cancer Maternal Grandmother     Cancer Maternal Grandfather         bladder    Hypertension Maternal Grandfather     Lung cancer Paternal Grandmother     Heart disease Paternal Grandfather     No Known Problems Maternal Aunt     No Known Problems Maternal Aunt     No Known Problems Paternal Aunt        Social History     Occupational History    Not on file   Tobacco Use    Smoking status: Never Smoker    Smokeless tobacco: Never Used   Substance and Sexual Activity    Alcohol use: Yes    Drug use: No    Sexual activity: Yes     Partners: Male     Birth control/protection: IUD         Current Outpatient Medications:     fluticasone (FLONASE) 50 mcg/act nasal spray, 1 spray into each nostril daily, Disp: , Rfl:     levonorgestrel (KYLEENA) 19 5 MG intrauterine device, 1 Intra Uterine Device by Intrauterine route once, Disp: , Rfl:     loratadine (CLARITIN) 10 mg tablet, Take by mouth, Disp: , Rfl:     naproxen (NAPROSYN) 500 mg tablet, TAKE 1 TABLET (500 MG TOTAL) BY MOUTH 2 (TWO) TIMES A DAY WITH MEALS  TAKE WITH MEALS , Disp: , Rfl:     ranitidine (ZANTAC) 75 MG tablet, Take 75 mg by mouth 2 (two) times a day, Disp: , Rfl:     No Known Allergies    Objective:  Vitals:    09/26/19 0827   BP: 112/74   Pulse: 65           Observations   Left Ankle/Foot   Negative for deformity  Physical Exam   Constitutional: She is oriented to person, place, and time  She appears well-developed and well-nourished  HENT:   Head: Normocephalic and atraumatic  Eyes: Pupils are equal, round, and reactive to light  Conjunctivae are normal    Neck: Normal range of motion  Neck supple  Cardiovascular: Normal rate and intact distal pulses  Pulmonary/Chest: Effort normal  No respiratory distress  Musculoskeletal:        Left foot: There is tenderness and bony tenderness   There is normal range of motion, no swelling, no deformity and no laceration  Feet:    (-) Murphy's Test   Neurological: She is alert and oriented to person, place, and time  Skin: Skin is warm and dry  Psychiatric: She has a normal mood and affect  Her behavior is normal    Vitals reviewed

## 2019-10-05 ENCOUNTER — HOSPITAL ENCOUNTER (OUTPATIENT)
Dept: MRI IMAGING | Facility: HOSPITAL | Age: 39
Discharge: HOME | End: 2019-10-05
Attending: ORTHOPAEDIC SURGERY
Payer: COMMERCIAL

## 2019-10-05 DIAGNOSIS — M79.672 PAIN OF LEFT HEEL: ICD-10-CM

## 2019-10-05 PROCEDURE — 73721 MRI JNT OF LWR EXTRE W/O DYE: CPT

## 2019-10-07 ENCOUNTER — TELEPHONE (OUTPATIENT)
Dept: OBGYN CLINIC | Facility: CLINIC | Age: 39
End: 2019-10-07

## 2019-10-07 DIAGNOSIS — M79.672 PAIN OF LEFT HEEL: Primary | ICD-10-CM

## 2019-10-07 NOTE — TELEPHONE ENCOUNTER
----- Message from Monroe Carbajal PA-C sent at 10/7/2019 12:13 PM EDT -----  Arthritis, tendonitis, and plantar fasciitis  No surgical intervention advised   PT advised for patient   ----- Message -----  From: Interface, Radiology Results In  Sent: 10/7/2019  10:49 AM EDT  To: Bakari Grossman DO

## 2019-10-16 ENCOUNTER — EVALUATION (OUTPATIENT)
Dept: PHYSICAL THERAPY | Facility: CLINIC | Age: 39
End: 2019-10-16
Payer: COMMERCIAL

## 2019-10-16 DIAGNOSIS — M79.672 PAIN OF LEFT HEEL: Primary | ICD-10-CM

## 2019-10-16 PROCEDURE — 97161 PT EVAL LOW COMPLEX 20 MIN: CPT

## 2019-10-16 NOTE — PROGRESS NOTES
PT Evaluation     Today's date: 10/16/2019  Patient name: Kimani Phillips  : 1980  MRN: [id number]  Referring provider: Janae Monte DO  Dx:   Encounter Diagnosis     ICD-10-CM    1  Pain of left heel M79 672 Ambulatory referral to Physical Therapy                  Assessment  Assessment details: Kimani Phillips is a 35 y o  female who presents with pain, decreased ROM, decreased joint mobility, ambulatory dysfunction, postural  dysfunction and reduced flexibility gastroc/soleus, poor timing hamstrings and glutes and reduced function  Due to these impairments, patient has difficulty performing ADL's, recreational activities, ambulation  Patient's clinical presentation is consistent with their referring diagnosis of No diagnosis found    Patient has been educated in home exercise program and plan of care   Patient would benefit from skilled physical therapy services to address their aforementioned functional limitations and progress towards prior level of function and independence with home exercise program    Impairments: abnormal gait, abnormal muscle firing, abnormal or restricted ROM, abnormal movement, activity intolerance, impaired physical strength, lacks appropriate home exercise program, pain with function, weight-bearing intolerance, poor posture  and poor body mechanics  Other impairment: impaired stability  Functional limitations: per patient subjectiveUnderstanding of Dx/Px/POC: good   Prognosis: good    Goals  STG:  + Patient will have pain level 0/10 left heel  at rest after PT(2-3 weeks)  + Patient will report a 40% improvement in symptoms (2-3 weeks)    + Patient will be independent in basic HEP (2-3 weeks)  + Patient will demonstrate an increase in range of motion left  ankleDF   to  5 AROM (2-3 weeks)  + Patient will report increased ease walking/standing due to a reduction in pain (2-3 weeks)  + Patient will demonstrate proper timing of hamstrings and glutes (3 weeks)    LTG:  + Patient will have pain level 2/10 left ankle  with ADL's (4-6 weeks)  + Patient will report a 80% improvement in symptoms (4-6 weeks)   + Patient will increase FOTO score by 10 points  (10 sessions)   + Patient will be independent in comprehensive HEP (4-6 weeks)  + Patient will demonstrate no gait deviations ambulating on level surfaces and painfree  (4-6 week)  + Patient will demonstrate an increase in range of motion left ankle AROM in all planes  to  within normal limits and painfree (4-6 weeks)    + Patient will report no functional limitations (4-6 weeks)    Plan  Plan details:  2-3 x week, 4-6 weeks: HEP development, stretching LE musculature per objective findings, strengthening LE musculature per objective findings, A/AA/PROM ankle motions, joint mobilizations prn, posture education especially foot posture prn, STM/MI as needed to reduce muscle tension as per objective findings, muscle reeducation prn, gait/balance training, stability training, Sidhu/Ktape prn PLOC discussed and agreed upon with patient  Patient would benefit from: PT eval and skilled physical therapy  Planned modality interventions: thermotherapy: hydrocollator packs and cryotherapy  Planned therapy interventions: joint mobilization, manual therapy, Sidhu taping, balance, neuromuscular re-education, patient education, postural training, strengthening, stretching, therapeutic activities, therapeutic exercise, flexibility, functional ROM exercises, gait training, graded exercise and home exercise program  Frequency: 2x week  Plan of Care beginning date: 10/16/2019  Plan of Care expiration date: 11/27/2019  Treatment plan discussed with: patient        Subjective Evaluation    History of Present Illness  Mechanism of injury: Pt reports symptoms began ~  2 months ago  States the origin of symptoms is gradual development of left heel pain which became worse during a grady class  Described symptoms location as mid calcaneous    Describes symptoms as pinching and burning on occasion  States symptoms are better with movement, being in sneakers, heel cups in dress shoes, worse with running on treadmill, grady, shoes without supports  Other functional limitations include: has retained doing all activity: pain with grady and running  Prolong standing: 3 hours at home is painful  + bunionectomy right foot and bunion present on left               Not a recurrent problem   Quality of life: good    Pain  Current pain ratin  At best pain ratin  At worst pain ratin  Location: see above  Progression: no change    Social Support  Steps to enter house: yes  L[facility] in: [facility]  Lives with: spouse and young children    Employment status: working (full time social work: sitting )  Exercise history: 3-4 x week: grady, running on treadmill, elliptical, weightlifting, yoga walking      Diagnostic Tests  X-ray: normal  MRI studies: abnormal (+ arthritis, tendonitis and plantarfascitis)  Treatments  No previous or current treatments  Patient Goals  Patient goals for therapy: decreased pain, independence with ADLs/IADLs and return to sport/leisure activities  Patient goal: to learn exercises that will stretch out area and alleviate the pain, determine what her limits are  Objective     Static Posture     Comments  Posture:+ calcaneal eversion timothy, reduced arches bilaterally,     Gait:no assistive device, increased pronation bilaterally    Elevations: per patient: reciprical    Functional activities  SLS: Right 40 no pain    Left: 40 no pain   Squat: wnl  no pain     Palpation: + tension and tenderness medial soleus and gastroc muscle belly    ROM  Ankle Dorsiflexion: Right: 5 /8 no pain  Left: 3/5 no pain   Ankle Plantarflexion: Right: wnl Left: wnl   Ankle Inversion: Right: wnl  Left: wnl   Ankle Eversion: Right: wnl  Left: wnl   First MTP flexion: Right: wnl   Left: wnl   First MTP extension: Right: wnl Left: wnl    Joint mobility: Talocrural: Right: fair +   Left: fair +     Metatarsal: Right: wnl   Left: wnl     MMT: wnl bilaterally  Ankle Dorsiflexion: Right: 4 /5  left: 4/5   Ankle Plantarflexion: Right: 4 /5  Left: 4/5   Ankle Inversion: Right: 4/5   Left: 4/5   Ankle Eversion: Right: 4/5   Left: 4/5   Bilateral heel raise: 10 x : + pull on medial aspect achilles tendon left   Unilateral heel raise: Right:  10 no pain   Left 10x no pain   Clam shell: Right: 4+/5 Left: 4+/5  sidelying hip abd: Right: 4+/5 Left: 4+/5    Prone hip extension: timing between hamstring and glute: hamstring dominant; hip extension MMT 4/5     Flexibility  Gastroc/soleus: Right:  poor  Left poor  Hamstrings: Right:  wnl Left wnl             Precautions standard    Specialty Daily Treatment Diary     Insurance: [facility]        Visits: 1       Manual 10/16/19       PROM Ankle        STM soleus  IASTM: gastroc Next visit        Joint mobs: TC                 Total time:                Exercise Diary                 prostretch Next visit        Bapps Bd:    DF/PF  INV/EV  CW/CCW                SLS                Ankle Tband 4 way        Rockerboard:  DF/PF  INV/EV                glute set prone hip extension with glute set Next visit        Bridging  Next visit        Soleus stretch instruct        Calf stretch instruct       Fire hydrants Next visit        Total time:                Modalities                Ice        Total time:

## 2019-10-17 ENCOUNTER — OFFICE VISIT (OUTPATIENT)
Dept: PHYSICAL THERAPY | Facility: CLINIC | Age: 39
End: 2019-10-17
Payer: COMMERCIAL

## 2019-10-17 DIAGNOSIS — M79.672 PAIN OF LEFT HEEL: Primary | ICD-10-CM

## 2019-10-17 PROCEDURE — 97140 MANUAL THERAPY 1/> REGIONS: CPT

## 2019-10-17 PROCEDURE — 97110 THERAPEUTIC EXERCISES: CPT

## 2019-10-17 NOTE — PROGRESS NOTES
Daily Note     Today's date: 10/17/2019  Patient name: Mar Rosenbaum  : 1980  MRN: [id number]  Referring provider: Magdaleno Jeffers DO  Dx:   Encounter Diagnosis     ICD-10-CM    1  Pain of left heel M79 672                   Subjective: Karine reports that her pain level entering today  States she       Objective: See treatment diary below  Precautions standard    Specialty Daily Treatment Diary     Insurance:  [facility]        Visits: 1 2      Manual 10/16/19 10/17/19      PROM Ankle        STM soleus   Performed       Joint mobs        Manual: gastroc stretch        Total time:  15 min               Exercise Diary         Rec bike        prostretch  10 sec x 5        Bapps Bd:    DF/PF  INV/EV  CW/CCW                SLS                Ankle Tband 4 way        Rockerboard:  DF/PF  INV/EV  Next visit       Glut set  5 sec x 10        glute set prone hip extension with glute set    5 sec x 10         Bridging   5 sec x 10        Soleus stretch  10 sec x 5        Calf stretch: prostretch        Self rolling with tennis ball  Foam roller  instruct      Total time:  25 min               Modalities        MH        Ice        Total time:         Reeval: 19    Assessment: patient had no pain with stretches  Still has signficant tension noted in soleus with tenderness  Patient was issued updated Hep in written form  Patient demonstrated independence with these exercises  Plan: Progress treatment as tolerated

## 2019-10-21 ENCOUNTER — OFFICE VISIT (OUTPATIENT)
Dept: PHYSICAL THERAPY | Facility: CLINIC | Age: 39
End: 2019-10-21
Payer: COMMERCIAL

## 2019-10-21 DIAGNOSIS — M79.672 PAIN OF LEFT HEEL: Primary | ICD-10-CM

## 2019-10-21 PROCEDURE — 97110 THERAPEUTIC EXERCISES: CPT

## 2019-10-21 PROCEDURE — 97140 MANUAL THERAPY 1/> REGIONS: CPT

## 2019-10-21 NOTE — PROGRESS NOTES
Daily Note     Today's date: 10/21/2019  Patient name: Paulette Pimentel  : 1980  MRN: [id number]  Referring provider: Shoaib De La Rosa DO  Dx:   Encounter Diagnosis     ICD-10-CM    1  Pain of left heel M79 672                   Subjective: Karine reports that her heel was tender today because she did not have a chance to stretch yesterday  States pain level 6/10 at worst today  Objective: See treatment diary below  Precautions standard    Specialty Daily Treatment Diary     Insurance:  [facility]        Visits: 1 2 3     Manual 10/16/19 10/17/19 10/21/19     PROM Ankle        STM soleus   Performed  Performed soleus and calf muscle and MI of calcaneus pad     Joint mobs   TC joint AP mobs      Manual: gastroc stretch   10 sec x 5       Total time:  15 min  13 min              Exercise Diary         Rec bike        Bapps Bd:    DF/PF  INV/EV  CW/CCW   Seated lv 2 10 x each direction  SLS                Ankle Tband 4 way   Next visit      Rockerboard:  DF/PF  INV/EV  Next visit  15  X each direction  Glut set  5 sec x 10   5 sec x 10       glute set prone hip extension with glute set    5 sec x 10    5 sec x 10       Bridging   5 sec x 10    + strap for hip abd iso 5 sec x 10       unilateral bridge   10 x each      Soleus stretch  10 sec x 5   10 sec x 5       Calf stretch: prostretch  10 sec x 5   10 sec x 5       Self rolling with tennis ball  Foam roller  instruct      Fire hydrant    Next visit      Total time:  25 min  30 min              Modalities        MH        Ice        Total time:         Reeval: 19      Assessment: less tenderness and tension noted in soleus muscle with STM and MI> patient was highly challenged with bridging actvities  Had good control with bapps board all directions  Plan: progress as toelrated

## 2019-10-23 ENCOUNTER — APPOINTMENT (OUTPATIENT)
Dept: PHYSICAL THERAPY | Facility: CLINIC | Age: 39
End: 2019-10-23
Payer: COMMERCIAL

## 2019-10-28 ENCOUNTER — OFFICE VISIT (OUTPATIENT)
Dept: PHYSICAL THERAPY | Facility: CLINIC | Age: 39
End: 2019-10-28
Payer: COMMERCIAL

## 2019-10-28 DIAGNOSIS — M79.672 PAIN OF LEFT HEEL: Primary | ICD-10-CM

## 2019-10-28 PROCEDURE — 97110 THERAPEUTIC EXERCISES: CPT

## 2019-10-28 PROCEDURE — 97140 MANUAL THERAPY 1/> REGIONS: CPT

## 2019-10-28 NOTE — PROGRESS NOTES
Daily Note     Today's date: 10/28/2019  Patient name: Paulette Pimentel  : 1980  MRN: [id number]  Referring provider: Shoaib De La Rosa DO  Dx:   Encounter Diagnosis     ICD-10-CM    1  Pain of left heel M79 672                   Subjective: Karine reports that her pain level entering today was 0/10, states pain over weekend was 4 10, but states she did not have time to do Hep  Objective: See treatment diary below  Precautions standard    Specialty Daily Treatment Diary     Insurance:  [facility]        Visits: 1 2 3 4    Manual 10/16/19 10/17/19 10/21/19 10/28/29    PROM Ankle        STM soleus   Performed  Performed soleus and calf muscle and MI of calcaneus pad Performed soleus and calf muscle      Joint mobs   TC joint AP mobs  Performed     Manual: gastroc stretch   10 sec x 5   10 sec x 5      Total time:  15 min  13 min  13 min             Exercise Diary         Rec bike    Elliptical machine 5 min lv 1    Bapps Bd:    DF/PF  INV/EV  CW/CCW   Seated lv 2 10 x each direction  Standing: 10 x each             SLS                Ankle Tband 4 way   Next visit  Red 2 x 10  Each     Rockerboard:  DF/PF  INV/EV  Next visit  15  X each direction  20x     Glut set  5 sec x 10   5 sec x 10   D/c     glute set prone hip extension with glute set    5 sec x 10    5 sec x 10   5 sec x 10      Bridging   5 sec x 10    + strap for hip abd iso 5 sec x 10   5 sec x 10      unilateral bridge   10 x each  10 x     Soleus stretch  10 sec x 5   10 sec x 5   10 sec x 5      Calf stretch: prostretch  10 sec x 5   10 sec x 5   10 sec x 5      Self rolling with tennis ball  Foam roller  instruct      Fire hydrant    Next visit  5 sec x 10      Total time:  25 min  30 min  30 min             Modalities        MH        Ice        Total time:         Reeval: 19    Assessment:   Still noted is tension in area of gastroc muscle and soleus  Patient reports overall much reduction in tissue tension compared to IE    Patient no longer needs cuing for form with bridging  Good form observed  Plan: continue to address soft tissue restriciton in calf muscle

## 2019-10-30 ENCOUNTER — APPOINTMENT (OUTPATIENT)
Dept: PHYSICAL THERAPY | Facility: CLINIC | Age: 39
End: 2019-10-30
Payer: COMMERCIAL

## 2019-11-04 ENCOUNTER — APPOINTMENT (OUTPATIENT)
Dept: PHYSICAL THERAPY | Facility: CLINIC | Age: 39
End: 2019-11-04
Payer: COMMERCIAL

## 2019-11-06 ENCOUNTER — OFFICE VISIT (OUTPATIENT)
Dept: PHYSICAL THERAPY | Facility: CLINIC | Age: 39
End: 2019-11-06
Payer: COMMERCIAL

## 2019-11-06 DIAGNOSIS — M79.672 PAIN OF LEFT HEEL: Primary | ICD-10-CM

## 2019-11-06 PROCEDURE — 97110 THERAPEUTIC EXERCISES: CPT

## 2019-11-06 NOTE — PROGRESS NOTES
Daily Note     Today's date: 2019  Patient name: Triston To  : 1980  MRN: [id number]  Referring provider: Jade Dhaliwal DO  Dx:   Encounter Diagnosis     ICD-10-CM    1  Pain of left heel M79 672                   Subjective: Karine reports that her symptoms are significantly better  Only pain over weekend was ~ 10 min heel pain   Objective: See treatment diary below  Precautions standard    Specialty Daily Treatment Diary     Insurance:  [facility]   Visits: 1 2 3 4 5    Manual 10/16/19 10/17/19 10/21/19 10/28/29 11/4/19   PROM Ankle        STM soleus   Performed  Performed soleus and calf muscle and MI of calcaneus pad Performed soleus and calf muscle      Joint mobs   TC joint AP mobs  Performed     Manual: gastroc stretch   10 sec x 5   10 sec x 5      Total time:  15 min  13 min  13 min  ---           Exercise Diary         Rec bike    Elliptical machine 5 min lv 1 lv 2 8 min    Bapps Bd:    DF/PF  INV/EV  CW/CCW   Seated lv 2 10 x each direction  Standing: 10 x each  ---           SLS                Ankle Tband 4 way   Next visit  Red 2 x 10  Each  Red 2 x 10     Rockerboard:  DF/PF  INV/EV  Next visit  15  X each direction  20x  ---   Glut set  5 sec x 10   5 sec x 10   D/c     glute set prone hip extension with glute set    5 sec x 10    5 sec x 10   5 sec x 10   5 sec x 10     Bridging   5 sec x 10    + strap for hip abd iso 5 sec x 10   5 sec x 10   5 sec x 10  + hip abd iso strap   unilateral bridge   10 x each  10 x  5 sec x 10     Soleus stretch  10 sec x 5   10 sec x 5   10 sec x 5   10 sec x 5     Calf stretch: prostretch  10 sec x 5   10 sec x 5   10 sec x 5   10 sec x 5     Self rolling with tennis ball   Foam roller  instruct   Self rolling with foam roller    Fire hydrant    Next visit  5 sec x 10   5 sec x 10     Total time:  25 min  30 min  30 min  20 min            Modalities        MH        Ice        Total time:         Reeval: 19      Assessment: patient is making steady gains  Is managing well with home stretches and HEP  Needs cuing for tightening glutes prior to doing bridging but can self correct with verbal cuing         Plan: reduce tx to 1 x week and begin transition to HEp

## 2019-11-08 ENCOUNTER — APPOINTMENT (OUTPATIENT)
Dept: PHYSICAL THERAPY | Facility: CLINIC | Age: 39
End: 2019-11-08
Payer: COMMERCIAL

## 2019-11-11 ENCOUNTER — OFFICE VISIT (OUTPATIENT)
Dept: PHYSICAL THERAPY | Facility: CLINIC | Age: 39
End: 2019-11-11
Payer: COMMERCIAL

## 2019-11-11 DIAGNOSIS — M79.672 PAIN OF LEFT HEEL: Primary | ICD-10-CM

## 2019-11-11 PROCEDURE — 97110 THERAPEUTIC EXERCISES: CPT

## 2019-11-11 NOTE — PROGRESS NOTES
Daily Note     Today's date: 2019  Patient name: Valeri Vallejo  : 1980  MRN: [id number]  Referring provider: Marci Gonzales DO  Dx:   Encounter Diagnosis     ICD-10-CM    1  Pain of left heel M79 672                   Subjective: Karine reports that her pain level entering today was 0/10  Objective: See treatment diary below  Precautions standard    Specialty Daily Treatment Diary     Insurance:  [id number] [address]    Visits: 2 3 4 5  6   Manual 10/17/19 10/21/19 10/28/29 11/4/19 11/11/19   PROM Ankle        STM soleus  Performed  Performed soleus and calf muscle and MI of calcaneus pad Performed soleus and calf muscle       Joint mobs  TC joint AP mobs  Performed      Manual: gastroc stretch  10 sec x 5   10 sec x 5       Total time: 15 min  13 min  13 min  --- ---           Exercise Diary         Rec bike   Elliptical machine 5 min lv 1 lv 2 8 min  Held; came from gym   Bapps Bd:    DF/PF  INV/EV  CW/CCW  Seated lv 2 10 x each direction  Standing: 10 x each  --- lv 3 10 x each direction   Arch lift     5 sec x 10     SLS     With arch lift : 10 sec x 5             Ankle Tband 4 way  Next visit  Red 2 x 10  Each  Red 2 x 10   Green PF, DF 2 x 10    Red: inv/ev   Rockerboard:  DF/PF  INV/EV Next visit  15  X each direction  20x  --- 20 x no UE support    glute set prone hip extension with glute set   5 sec x 10    5 sec x 10   5 sec x 10   5 sec x 10   2 lb 5 sec x 10     Bridging  5 sec x 10    + strap for hip abd iso 5 sec x 10   5 sec x 10   5 sec x 10  + hip abd iso strap 5 sec x 10     unilateral bridge  10 x each  10 x  5 sec x 10   5 sec x 10     Soleus stretch 10 sec x 5   10 sec x 5   10 sec x 5   10 sec x 5   10 sec x 5     Calf stretch: prostretch 10 sec x 5   10 sec x 5   10 sec x 5   10 sec x 5   10 sec x 5     Self rolling with tennis ball   Foam roller instruct   Self rolling with foam roller  Self rolling with foam roller, roll out stick  performed   Fire hydrant   Next visit  5 sec x 10   5 sec x 10   5 sec x 10     Card : foot intrinsics     10 sec x 5     Total time: 25 min  30 min  30 min  20 min  40 min            Modalities                Ice        Total time:         Reeval: 11/16/19  Assessment: patient continues to show progress  Experienced foot cramping with arch lift  Was able to perform toe intrinsic iso with card without difficulty          Plan: continue 1 x week; reeval 11/16/19

## 2019-11-13 ENCOUNTER — APPOINTMENT (OUTPATIENT)
Dept: PHYSICAL THERAPY | Facility: CLINIC | Age: 39
End: 2019-11-13
Payer: COMMERCIAL

## 2019-11-18 ENCOUNTER — APPOINTMENT (OUTPATIENT)
Dept: PHYSICAL THERAPY | Facility: CLINIC | Age: 39
End: 2019-11-18
Payer: COMMERCIAL

## 2019-11-20 ENCOUNTER — EVALUATION (OUTPATIENT)
Dept: PHYSICAL THERAPY | Facility: CLINIC | Age: 39
End: 2019-11-20
Payer: COMMERCIAL

## 2019-11-20 DIAGNOSIS — M79.672 PAIN OF LEFT HEEL: Primary | ICD-10-CM

## 2019-11-20 PROCEDURE — 97110 THERAPEUTIC EXERCISES: CPT

## 2019-11-20 NOTE — PROGRESS NOTES
PT discharge summary      Today's date: 2019  Patient name: Shobha Garcia  : 1980  MRN: [id number]  Referring provider: Jean Reilly DO  Dx:   Encounter Diagnosis     ICD-10-CM    1  Pain of left heel M79 672                   Subjective: Karine reports that her % improvement is 95%  Pain level at rest 0/10, with ADL's 1/10, at worst 7/10 ( occurs 1 x week consistent with not performing self stretches)  Current symptoms include: pinch in area of left heel   Function: none       Objective: See treatment diary below  Precautions standard    Specialty Daily Treatment Diary     Insurance:  [place] [id number]     Visits: 3 4 5  6 7   Manual 10/21/19 10/28/29 11/4/19 11/11/19 11/20/19   PROM Ankle        STM soleus  Performed soleus and calf muscle and MI of calcaneus pad Performed soleus and calf muscle        Joint mobs TC joint AP mobs  Performed       Manual: gastroc stretch 10 sec x 5   10 sec x 5        Total time: 13 min  13 min  --- ---            Exercise Diary      Reeval     Rec bike  Elliptical machine 5 min lv 1 lv 2 8 min  Held; came from gym    Bapps Bd:    DF/PF  INV/EV  CW/CCW Seated lv 2 10 x each direction  Standing: 10 x each  --- lv 3 10 x each direction    Arch lift    5 sec x 10   Discussed    SLS    With arch lift : 10 sec x 5              Ankle Tband 4 way Next visit  Red 2 x 10  Each  Red 2 x 10   Green PF, DF 2 x 10    Red: inv/ev Green 2 x 10  All directions   Rockerboard:  DF/PF  INV/EV 15  X each direction    20x  --- 20 x no UE support  20 x no UE support + squat   glute set prone hip extension with glute set 5 sec x 10   5 sec x 10   5 sec x 10   2 lb 5 sec x 10   2 lb 5 sec x 10     Bridging   + strap for hip abd iso 5 sec x 10   5 sec x 10   5 sec x 10  + hip abd iso strap 5 sec x 10   + hip abd iso: 5 sec x 10     unilateral bridge 10 x each  10 x  5 sec x 10   5 sec x 10   5 sec x 10     Soleus stretch 10 sec x 5   10 sec x 5   10 sec x 5   10 sec x 5   10 sec x 5 Calf stretch: prostretch 10 sec x 5   10 sec x 5   10 sec x 5   10 sec x 5   10 sec x 5     Self rolling with tennis ball  Foam roller   Self rolling with foam roller  Self rolling with foam roller, roll out stick  performed heP   Fire hydrant  Next visit  5 sec x 10   5 sec x 10   5 sec x 10      Card : foot intrinsics    10 sec x 5      Total time: 30 min  30 min  20 min  40 min             Modalities        MH        Ice        Total time:         Reeval: 11/16/19      Goals (11/20/19)  STG:  + Patient will have pain level 0/10 left heel  at rest after PT(2-3 weeks) (met)   + Patient will report a 40% improvement in symptoms (2-3 weeks)  (met)   + Patient will be independent in basic HEP (2-3 weeks) (met)   + Patient will demonstrate an increase in range of motion left  ankleDF   to  5 AROM (2-3 weeks) (met)   + Patient will report increased ease walking/standing due to a reduction in pain (2-3 weeks) (met)   + Patient will demonstrate proper timing of hamstrings and glutes (3 weeks) (met)     LTG:  + Patient will have pain level 2/10 left ankle  with ADL's (4-6 weeks) (met)   + Patient will report a 80% improvement in symptoms (4-6 weeks) (met)   + Patient will increase FOTO score by 10 points  (10 sessions) (met)   + Patient will be independent in comprehensive HEP (4-6 weeks) (met)   + Patient will demonstrate no gait deviations ambulating on level surfaces and painfree  (4-6 week) (met)   + Patient will demonstrate an increase in range of motion left ankle AROM in all planes  to  within normal limits and painfree (4-6 weeks) (met)     + Patient will report no functional limitations (4-6 weeks) (met)     Static Posture ( 11/20/19)    Comments  Posture:+ calcaneal eversion timothy, reduced arches bilaterally, (status quo)     Gait:no assistive device, increased pronation bilaterally   ( patient aware of benefits for proper footwear)  Elevations: per patient: reciprical (status quo)     Functional activities (status quo)   SLS: Right 40 no pain    Left: 40 no pain   Squat: wnl  no pain     Palpation: + tension and tenderness medial soleus and gastroc muscle belly ( significantly improved no TTP )    ROM  Ankle Dorsiflexion: Right: 5 /8 no pain  Left: 3/5 no pain ( 9/11 NO PAIN)  Ankle Plantarflexion: Right: wnl Left: wnl   Ankle Inversion: Right: wnl  Left: wnl   Ankle Eversion: Right: wnl  Left: wnl   First MTP flexion: Right: wnl   Left: wnl   First MTP extension: Right: wnl Left: wnl    Joint mobility:   Talocrural: Right: fair +   Left: fair +  ( WNL)    Metatarsal: Right: wnl   Left: wnl (     MMT: wnl bilaterally ( 4+/5 LEFT NO PAIN)  Ankle Dorsiflexion: Right: 4 /5  left: 4/5   Ankle Plantarflexion: Right: 4 /5  Left: 4/5   Ankle Inversion: Right: 4/5   Left: 4/5   Ankle Eversion: Right: 4/5   Left: 4/5   Bilateral heel raise: 10 x : + pull on medial aspect achilles tendon left   Unilateral heel raise: Right:  10 no pain   Left 10x no pain   Clam shell: Right: 4+/5 Left: 4+/5  sidelying hip abd: Right: 4+/5 Left: 4+/5    Prone hip extension: timing between hamstring and glute: hamstring dominant; ( GOOD TIMING)  hip extension MMT 4/5 (status quo)     Flexibility  Gastroc/soleus: Right:  poor  Left poor ( FAIR + MUCH IMPROVED)  Hamstrings: Right:  wnl Left wnl    Assessment:   Patient has made significant gains since [facility] with reducing pain  And ackowledging the impact her HEP has on her symptoms  Patient has a good working knowledge of proper footwear  She is independent in HEP and will continue to benefit from it   At this time patient no longer requires skilled PT     Plan: d/c to HEP

## 2019-11-25 ENCOUNTER — APPOINTMENT (OUTPATIENT)
Dept: PHYSICAL THERAPY | Facility: CLINIC | Age: 39
End: 2019-11-25
Payer: COMMERCIAL

## 2019-12-03 ENCOUNTER — OFFICE VISIT (OUTPATIENT)
Dept: FAMILY MEDICINE CLINIC | Facility: CLINIC | Age: 39
End: 2019-12-03
Payer: COMMERCIAL

## 2019-12-03 VITALS
SYSTOLIC BLOOD PRESSURE: 122 MMHG | DIASTOLIC BLOOD PRESSURE: 76 MMHG | RESPIRATION RATE: 16 BRPM | BODY MASS INDEX: 30.04 KG/M2 | HEART RATE: 72 BPM | WEIGHT: 175 LBS | OXYGEN SATURATION: 99 % | TEMPERATURE: 97.8 F

## 2019-12-03 DIAGNOSIS — M79.675 GREAT TOE PAIN, LEFT: Primary | ICD-10-CM

## 2019-12-03 PROCEDURE — 99214 OFFICE O/P EST MOD 30 MIN: CPT | Performed by: FAMILY MEDICINE

## 2019-12-03 PROCEDURE — 1036F TOBACCO NON-USER: CPT | Performed by: FAMILY MEDICINE

## 2019-12-03 RX ORDER — NAPROXEN 500 MG/1
500 TABLET ORAL 2 TIMES DAILY WITH MEALS
Qty: 20 TABLET | Refills: 0 | Status: SHIPPED | OUTPATIENT
Start: 2019-12-03 | End: 2020-08-07

## 2019-12-03 NOTE — PATIENT INSTRUCTIONS
Gout   AMBULATORY CARE:   Gout  is a form of arthritis that causes severe joint pain and stiffness  Acute gout pain starts suddenly, gets worse quickly, and stops on its own  Acute gout can become chronic and cause permanent damage to your joints  Seek care immediately if:   · You have severe pain in one or more of your joints that you cannot tolerate  · You have a fever or redness that spreads beyond the joint area  Contact your healthcare provider if:   · You have new symptoms, such as a rash, after you start gout treatment  · Your joint pain and swelling do not go away, even after treatment  · You are not urinating as much or as often as you usually do  · You have trouble taking your gout medicines  · You have questions or concerns about your condition or care  Stages of gout:   · Hyperuricemia  starts with high levels of uric acid  Hyperuricemia is not gout, but it increases your risk for gout  You may have no symptoms at this stage, and it usually does not need treatment  · Acute gouty arthritis  starts with a sudden attack of pain and swelling, usually in 1 joint  The attack may last from a few days to 2 weeks  · Intercritical gout  is the time between attacks  You may go months or years without another attack  You will not have joint pain or stiffness, but this does not mean your gout is cured  You will still need treatment to prevent chronic gout  · Chronic tophaceous gout  develops if gout is not treated  Large amounts of uric acid crystals, called tophi, collect around your joints  The crystals can destroy or deform the joints  Gout attacks occur more often, and last hours to weeks  More than 1 joint may be painful and swollen  At this stage, gout symptoms do not go away on their own  Treatment  can make your symptoms stop sooner, prevent attacks, and decrease your risk of joint damage   You may need any of the following:  · Medicines:      ¨ NSAIDs , such as ibuprofen, help decrease swelling, pain, and fever  This medicine is available with or without a doctor's order  NSAIDs can cause stomach bleeding or kidney problems in certain people  If you take blood thinner medicine, always ask your healthcare provider if NSAIDs are safe for you  Always read the medicine label and follow directions  ¨ Gout medicine  decreases joint pain and swelling  It may also be given to prevent new gout attacks  ¨ Steroids  reduce inflammation and can help your joint stiffness and pain during gout attacks  ¨ Uric acid medicine  may be given to reduce the amount of uric acid your body makes  Some medicines may help you pass more uric acid when you urinate  ¨ Take your medicine as directed  Contact your healthcare provider if you think your medicine is not helping or if you have side effects  Tell him or her if you are allergic to any medicine  Keep a list of the medicines, vitamins, and herbs you take  Include the amounts, and when and why you take them  Bring the list or the pill bottles to follow-up visits  Carry your medicine list with you in case of an emergency  · Surgery  called a bone graft may be needed for tophi that are painful or infected  Bone in the joint may be replaced with bone taken from another place in your body  Ask your healthcare provider for more information about bone graft surgery  Manage gout:   · Rest your painful joint so it can heal   Your healthcare provider may recommend crutches or a walker if the affected joint is in a leg  · Apply ice to your joint  Ice decreases pain and swelling  Use an ice pack, or put crushed ice in a plastic bag  Cover the ice pack or bag with a towel before you apply it to your painful joint  Apply ice for 15 to 20 minutes every hour, or as directed  · Elevate your joint  Elevation helps reduce swelling and pain  Raise your joint above the level of your heart as often as you can   Prop your painful joint on pillows to keep it above your heart comfortably  · Go to physical therapy if directed  A physical therapist can teach you exercises to improve flexibility and range of motion  Prevent gout attacks:   · Do not eat high-purine foods  These foods include meats, seafood, asparagus, spinach, cauliflower, and some types of beans  Healthcare providers may tell you to eat more low-fat milk products, such as yogurt  Milk products may decrease your risk for gout attacks  Vitamin C and coffee may also help  Your healthcare provider or dietitian can help you create a meal plan  · Drink more liquids as directed  Liquids such as water help remove uric acid from your body  Ask how much liquid to drink each day and which liquids are best for you  · Manage your weight  Weight loss may decrease the amount of uric acid in your body  Exercise can help you lose weight  Talk to your healthcare provider about the best exercises for you  · Control your blood sugar level if you have diabetes  Keep your blood sugar level in a normal range  This can help prevent gout attacks  · Limit or do not drink alcohol as directed  Alcohol can trigger a gout attack  Alcohol also increases your risk for dehydration  Ask your healthcare provider if alcohol is safe for you  Follow up with your healthcare provider as directed:  Write down your questions so you remember to ask them during your visits  © 2017 [address] Information is for End User's use only and may not be sold, redistributed or otherwise used for commercial purposes  All illustrations and images included in CareNotes® are the copyrighted property of [employer]  or Lester Ortiz  The above information is an  only  It is not intended as medical advice for individual conditions or treatments  Talk to your doctor, nurse or pharmacist before following any medical regimen to see if it is safe and effective for you
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
## 2020-06-11 ENCOUNTER — CONSULT (OUTPATIENT)
Dept: PAIN MEDICINE | Facility: CLINIC | Age: 40
End: 2020-06-11
Payer: COMMERCIAL

## 2020-06-11 VITALS
HEART RATE: 79 BPM | BODY MASS INDEX: 29.57 KG/M2 | SYSTOLIC BLOOD PRESSURE: 114 MMHG | TEMPERATURE: 97.9 F | WEIGHT: 173.2 LBS | DIASTOLIC BLOOD PRESSURE: 76 MMHG | HEIGHT: 64 IN

## 2020-06-11 DIAGNOSIS — M48.061 SPINAL STENOSIS OF LUMBAR REGION WITHOUT NEUROGENIC CLAUDICATION: ICD-10-CM

## 2020-06-11 DIAGNOSIS — G89.4 CHRONIC PAIN SYNDROME: Primary | ICD-10-CM

## 2020-06-11 DIAGNOSIS — M54.42 CHRONIC BILATERAL LOW BACK PAIN WITH BILATERAL SCIATICA: ICD-10-CM

## 2020-06-11 DIAGNOSIS — M54.16 LUMBAR RADICULOPATHY: ICD-10-CM

## 2020-06-11 DIAGNOSIS — M54.41 CHRONIC BILATERAL LOW BACK PAIN WITH BILATERAL SCIATICA: ICD-10-CM

## 2020-06-11 DIAGNOSIS — G89.29 CHRONIC BILATERAL LOW BACK PAIN WITH BILATERAL SCIATICA: ICD-10-CM

## 2020-06-11 PROCEDURE — 3008F BODY MASS INDEX DOCD: CPT | Performed by: ANESTHESIOLOGY

## 2020-06-11 PROCEDURE — 1036F TOBACCO NON-USER: CPT | Performed by: ANESTHESIOLOGY

## 2020-06-11 PROCEDURE — 99204 OFFICE O/P NEW MOD 45 MIN: CPT | Performed by: ANESTHESIOLOGY

## 2020-06-11 NOTE — H&P (VIEW-ONLY)
Assessment:  1  Chronic pain syndrome    2  Chronic bilateral low back pain with bilateral sciatica    3  Spinal stenosis of lumbar region without neurogenic claudication    4  Lumbar radiculopathy        Plan:  My impressions and treatment recommendations were discussed in detail with the patient, who verbalized understanding and had no further questions  Given that the patient has signs and symptoms of low back pain and bilateral lower extremity radiculopathy in the context of lumbar spinal stenosis, I felt a reasonable to offer the patient a L4-L5 lumbar epidural steroid injection since this could be potentially therapeutic  The procedures, its risks, and benefits were explained in detail to the patient  Risks include but are not limited to bleeding, infection, hematoma formation, abscess formation, weakness, headache, failure the pain to improve, nerve irritation or damage, and potential worsening of the pain  The patient verbalized understanding and wished to proceed with the procedure  Follow-up is planned in 4 weeks time or sooner as warranted  Discharge instructions were provided  I personally saw and examined the patient and I agree with the above discussed plan of care  History of Present Illness:    Carmenza Carcamo is a 38 y o  female who presents to [facility] for initial evaluation of the above stated pain complaints  The patient has a past medical and chronic pain history as outlined in the assessment section  She was self-referred  The patient reports a 2 year history of low back pain and bilateral lower extremity radicular symptoms  She states that her pain is moderate and 5 to 7/10 on the verbal numerical pain rating scale  Her pain is nearly constant in nature and worse in the morning  She describes her pain as shooting, numbness, and sharp  She ambulates without any assistive devices  Lying down, bending, sitting, and relaxation decreases pain  Standing, walking, and exercise increases pain  Exercises provide her moderate pain relief  Ibuprofen and naproxen do provide her some pain relief  Review of Systems:    Review of Systems   Constitutional: Negative for chills, diaphoresis and fatigue  HENT: Negative for drooling, facial swelling, nosebleeds, postnasal drip, rhinorrhea and sinus pain  Eyes: Negative for pain  Respiratory: Negative for choking, shortness of breath and stridor  Cardiovascular: Negative for leg swelling  Gastrointestinal: Negative for anal bleeding and constipation  Endocrine: Negative for heat intolerance and polyphagia  Genitourinary: Negative for decreased urine volume, enuresis, flank pain, frequency, menstrual problem and urgency  Musculoskeletal: Positive for back pain  Negative for gait problem, neck pain and neck stiffness  Skin: Negative for pallor, rash and wound  Allergic/Immunologic: Negative for immunocompromised state  Neurological: Negative for speech difficulty, weakness and numbness  Hematological: Negative for adenopathy  Psychiatric/Behavioral: Negative for behavioral problems, confusion, sleep disturbance and suicidal ideas  The patient is not hyperactive  Patient Active Problem List   Diagnosis    Lumbar radiculopathy    Spinal stenosis of lumbar region without neurogenic claudication    Routine adult health maintenance    Chronic pain syndrome    Chronic bilateral low back pain with bilateral sciatica       Past Medical History:   Diagnosis Date    Abnormal Pap smear of cervix     Allergic rhinitis     last assessed-12/31/2012    Anemia     Chronic pain syndrome 6/11/2020    SANDEEP III (cervical intraepithelial neoplasia grade III) with severe dysplasia     LEEP cone biopsy for SANDEEP-3 2/25/04    Done at the hospital    Gestational diabetes     antepartum condition or prior complicated delivery    Impacted cerumen of both ears     last assessed-6/13/2012    Low back pain     Oligomenorrhea     Spinal stenosis     Vaginal candidiasis     last assessed-2013    Varicella     chickenpox as a child    Vertigo     last assessed-2013       Past Surgical History:   Procedure Laterality Date    CERVICAL BIOPSY  W/ LOOP ELECTRODE EXCISION  2004    Pathololgy with SANDEEP-2 to 3, with free margins but dysplasia extended to within 1mm of endocervical margin  2004     SECTION, LOW TRANSVERSE  2009    Primary low transverse  for arrest in the active phase at 4 cm with unengaged vertex despite hours of labor    COLPOSCOPY W/ BIOPSY / CURETTAGE  2003    FOOT SURGERY Right     TOOTH EXTRACTION         Family History   Problem Relation Age of Onset    Hypertension Father     Kidney failure Father     Hypertension Maternal Grandmother     Lung cancer Maternal Grandmother     Cancer Maternal Grandfather         bladder    Hypertension Maternal Grandfather     Lung cancer Paternal Grandmother     Heart disease Paternal Grandfather     No Known Problems Maternal Aunt     No Known Problems Maternal Aunt     No Known Problems Paternal Aunt     No Known Problems Brother     No Known Problems Son     No Known Problems Maternal Uncle     No Known Problems Paternal Uncle        Social History     Occupational History    Not on file   Tobacco Use    Smoking status: Never Smoker    Smokeless tobacco: Never Used   Substance and Sexual Activity    Alcohol use:  Yes    Drug use: No    Sexual activity: Yes     Partners: Male     Birth control/protection: IUD         Current Outpatient Medications:     fluticasone (FLONASE) 50 mcg/act nasal spray, 1 spray into each nostril daily, Disp: , Rfl:     levonorgestrel (KYLEENA) 19 5 MG intrauterine device, 1 Intra Uterine Device by Intrauterine route once, Disp: , Rfl:     loratadine (CLARITIN) 10 mg tablet, Take by mouth, Disp: , Rfl:     naproxen (NAPROSYN) 500 mg tablet, Take 1 tablet (500 mg total) by mouth 2 (two) times a day with meals for 10 days, Disp: 20 tablet, Rfl: 0    No Known Allergies    Physical Exam:    /76   Pulse 79   Temp 97 9 °F (36 6 °C)   Ht 5' 4" (1 626 m)   Wt 78 6 kg (173 lb 3 2 oz)   BMI 29 73 kg/m²     Constitutional: normal, well developed, well nourished, alert, in no distress and non-toxic and no overt pain behavior  Eyes: anicteric  HEENT: grossly intact  Neck: supple, symmetric, trachea midline and no masses   Pulmonary:even and unlabored  Cardiovascular:No edema or pitting edema present  Skin:Normal without rashes or lesions and well hydrated  Psychiatric:Mood and affect appropriate  Neurologic:Cranial Nerves II-XII grossly intact  Musculoskeletal:normal     Lumbar Spine Exam    Appearance:  Normal lordosis  Palpation/Tenderness:  no tenderness or spasm  Sensory:  no sensory deficits noted  Range of Motion:  Flexion: Moderately limited  with pain  Extension:  No limitation  without pain  Lateral Flexion - Left:  No limitation  without pain  Lateral Flexion - Right:  No limitation  without pain  Rotation - Left:  No limitation  without pain  Rotation - Right:  No limitation  without pain  Motor Strength:  Left hip flexion:  5/5  Left hip extension:  5/5  Right hip flexion:  5/5  Right hip extension:  5/5  Left knee flexion:  5/5  Left knee extension:  5/5  Right knee flexion:  5/5  Right knee extension:  5/5  Left foot dorsiflexion:  5/5  Left foot plantar flexion:  5/5  Right foot dorsiflexion:  5/5  Right foot plantar flexion:  5/5  Reflexes:  Left Patellar:  2+   Right Patellar:  2+   Left Achilles:  2+   Right Achilles:  2+   Special Tests:  Left Straight Leg Test:  negative  Right Straight Leg Test:  negative  Left Fredy's Maneuver:  negative  Right Fredy's Maneuver:  negative    Imaging  XR Lumbar Spine 7/3/2018    History: Acute bilateral low back pain with bilateral sciatica  5 projections of the lumbar spine were obtained   There is no evidence of a  lumbar fracture  There are minimal degenerative changes of the apophyseal joints  of L4/5 and L5/S1 bilaterally  There is suspicion of unilateral spondylolysis of  L5 on the left  Other Result Information   Interface, Rad Results In - 07/03/2018 11:45 AM EDT  History: Acute bilateral low back pain with bilateral sciatica  5 projections of the lumbar spine were obtained  There is no evidence of a  lumbar fracture  There are minimal degenerative changes of the apophyseal joints  of L4/5 and L5/S1 bilaterally  There is suspicion of unilateral spondylolysis of  L5 on the left  IMPRESSION:  Impression: Minimal degenerative changes of the lumbar spine  MRI Lumbar Spine 8/2/2018    Impression:  1  Spondylosis with marked facet arthropathy  2  Minimal anterolisthesis of L3-L4  3  Central stenosis at L3-L4 and L4-L5   4  Bilateral foramina stenosis at multiple  See above  [id number]   Result Narrative   History: Lumbar radiculopathy        Procedure: MRI of the lumbar spine was obtained with the following sequences:  Sagittal T1, sagittal T2, sagittal STIR and axial T2 weighted images  Comparison: No prior MRI scan    Findings: For the purposes of this dictation, the lumbar vertebrae are labeled  from a caudal to cranial direction, the first vertebra with lumbar morphology is  labeled as L5  There is partial sacralization of the vertebral body labeled L5  Conus and lower thoracic cord: Within normal limits    Marrow and Alignment:        Bone marrow is within normal limits  There is minimal anterolisthesis of L3 on L4  L1-L2: Minimal degenerative changes in endplates  Moderate facet hypertrophy  Minimal left far lateral disc protrusion  L2-L3: Mild degenerative changes in endplates  Minimal disc bulging  Marked  facet hypertrophy  L3-L4: Moderate degenerative changes in endplates  Mild disc bulging  Marked  facet hypertrophy      L4-L5: Mild degenerative changes in endplates  Mild disc bulging  Moderate  central disc protrusion  Marked facet hypertrophy  L5-S1: The interspace is rudimentary  There is mild to moderate facet  hypertrophy  Minimal central stenosis at L2-L3  Marked central stenosis at L3-L4 and moderate to marked at L4-L5  There is right  foramina stenosis at L3-L4 and L4-L5  There is left foramina stenosis at L2-L3, L3-L4, L4-L5  Other Result Information   Interface, Rad Results In - 08/02/2018  2:47 PM EDT  History: Lumbar radiculopathy        Procedure: MRI of the lumbar spine was obtained with the following sequences:  Sagittal T1, sagittal T2, sagittal STIR and axial T2 weighted images  Comparison: No prior MRI scan    Findings: For the purposes of this dictation, the lumbar vertebrae are labeled  from a caudal to cranial direction, the first vertebra with lumbar morphology is  labeled as L5  There is partial sacralization of the vertebral body labeled L5  Conus and lower thoracic cord: Within normal limits    Marrow and Alignment:        Bone marrow is within normal limits  There is minimal anterolisthesis of L3 on L4  L1-L2: Minimal degenerative changes in endplates  Moderate facet hypertrophy  Minimal left far lateral disc protrusion  L2-L3: Mild degenerative changes in endplates  Minimal disc bulging  Marked  facet hypertrophy  L3-L4: Moderate degenerative changes in endplates  Mild disc bulging  Marked  facet hypertrophy  L4-L5: Mild degenerative changes in endplates  Mild disc bulging  Moderate  central disc protrusion  Marked facet hypertrophy  L5-S1: The interspace is rudimentary  There is mild to moderate facet  hypertrophy  Minimal central stenosis at L2-L3  Marked central stenosis at L3-L4 and moderate to marked at L4-L5  There is right  foramina stenosis at L3-L4 and L4-L5  There is left foramina stenosis at L2-L3, L3-L4, L4-L5  IMPRESSION:  Impression:  1   Spondylosis with marked facet arthropathy  2  Minimal anterolisthesis of L3-L4  3  Central stenosis at L3-L4 and L4-L5   4  Bilateral foramina stenosis at multiple  See above

## 2020-06-12 ENCOUNTER — HOSPITAL ENCOUNTER (OUTPATIENT)
Dept: RADIOLOGY | Age: 40
Discharge: HOME | End: 2020-06-12
Payer: COMMERCIAL

## 2020-06-12 VITALS — HEIGHT: 64 IN | BODY MASS INDEX: 29.53 KG/M2 | WEIGHT: 173 LBS

## 2020-06-12 DIAGNOSIS — Z12.31 ENCOUNTER FOR SCREENING MAMMOGRAM FOR MALIGNANT NEOPLASM OF BREAST: ICD-10-CM

## 2020-06-12 PROCEDURE — 77063 BREAST TOMOSYNTHESIS BI: CPT

## 2020-06-12 PROCEDURE — 77067 SCR MAMMO BI INCL CAD: CPT

## 2020-06-15 ENCOUNTER — TELEPHONE (OUTPATIENT)
Dept: PAIN MEDICINE | Facility: CLINIC | Age: 40
End: 2020-06-15

## 2020-06-15 DIAGNOSIS — Z01.818 PREOPERATIVE TESTING: Primary | ICD-10-CM

## 2020-06-15 NOTE — TELEPHONE ENCOUNTER
Scheduled pt for L4 L5 LESI for 7/9/20  Went over pre-procedure instructions below:  Pt denies rx blood thinners  Nothing to eat or drink 1 hr prior to procedure  Need to arrange transportation  Proper clothing for procedure  If ill or placed on antibiotics please call to reschedule  Pt instructed to go for Covid test at [facility] now between 8-12 on 7/5/20   covid disclaimer complete      PLEASE ORDER COVID TEST No

## 2020-06-16 DIAGNOSIS — R92.2 DENSE BREAST TISSUE ON MAMMOGRAM: Primary | ICD-10-CM

## 2020-07-05 ENCOUNTER — APPOINTMENT (OUTPATIENT)
Dept: URGENT CARE | Facility: MEDICAL CENTER | Age: 40
End: 2020-07-05
Payer: COMMERCIAL

## 2020-07-05 ENCOUNTER — DOCUMENTATION (OUTPATIENT)
Dept: URGENT CARE | Facility: MEDICAL CENTER | Age: 40
End: 2020-07-05

## 2020-07-05 DIAGNOSIS — Z01.818 PREOPERATIVE TESTING: ICD-10-CM

## 2020-07-05 PROCEDURE — U0003 INFECTIOUS AGENT DETECTION BY NUCLEIC ACID (DNA OR RNA); SEVERE ACUTE RESPIRATORY SYNDROME CORONAVIRUS 2 (SARS-COV-2) (CORONAVIRUS DISEASE [COVID-19]), AMPLIFIED PROBE TECHNIQUE, MAKING USE OF HIGH THROUGHPUT TECHNOLOGIES AS DESCRIBED BY CMS-2020-01-R: HCPCS

## 2020-07-09 ENCOUNTER — APPOINTMENT (OUTPATIENT)
Dept: RADIOLOGY | Facility: HOSPITAL | Age: 40
End: 2020-07-09
Payer: COMMERCIAL

## 2020-07-09 ENCOUNTER — HOSPITAL ENCOUNTER (OUTPATIENT)
Facility: AMBULARY SURGERY CENTER | Age: 40
Setting detail: OUTPATIENT SURGERY
Discharge: HOME | End: 2020-07-09
Attending: ANESTHESIOLOGY | Admitting: ANESTHESIOLOGY
Payer: COMMERCIAL

## 2020-07-09 ENCOUNTER — APPOINTMENT (OUTPATIENT)
Dept: LAB | Facility: HOSPITAL | Age: 40
End: 2020-07-09
Attending: ANESTHESIOLOGY
Payer: COMMERCIAL

## 2020-07-09 ENCOUNTER — TRANSCRIBE ORDERS (OUTPATIENT)
Dept: ADMINISTRATIVE | Facility: HOSPITAL | Age: 40
End: 2020-07-09

## 2020-07-09 VITALS
SYSTOLIC BLOOD PRESSURE: 108 MMHG | RESPIRATION RATE: 18 BRPM | HEART RATE: 54 BPM | OXYGEN SATURATION: 100 % | TEMPERATURE: 97.4 F | DIASTOLIC BLOOD PRESSURE: 52 MMHG

## 2020-07-09 DIAGNOSIS — Z01.818 PREOP TESTING: ICD-10-CM

## 2020-07-09 DIAGNOSIS — Z01.818 PREOP TESTING: Primary | ICD-10-CM

## 2020-07-09 LAB — SARS-COV-2 RNA RESP QL NAA+PROBE: NEGATIVE

## 2020-07-09 PROCEDURE — U0003 INFECTIOUS AGENT DETECTION BY NUCLEIC ACID (DNA OR RNA); SEVERE ACUTE RESPIRATORY SYNDROME CORONAVIRUS 2 (SARS-COV-2) (CORONAVIRUS DISEASE [COVID-19]), AMPLIFIED PROBE TECHNIQUE, MAKING USE OF HIGH THROUGHPUT TECHNOLOGIES AS DESCRIBED BY CMS-2020-01-R: HCPCS | Performed by: ANESTHESIOLOGY

## 2020-07-09 PROCEDURE — 72020 X-RAY EXAM OF SPINE 1 VIEW: CPT

## 2020-07-09 PROCEDURE — 62323 NJX INTERLAMINAR LMBR/SAC: CPT | Performed by: ANESTHESIOLOGY

## 2020-07-09 PROCEDURE — NC001 PR NO CHARGE: Performed by: ANESTHESIOLOGY

## 2020-07-09 RX ORDER — METHYLPREDNISOLONE ACETATE 80 MG/ML
INJECTION, SUSPENSION INTRA-ARTICULAR; INTRALESIONAL; INTRAMUSCULAR; SOFT TISSUE AS NEEDED
Status: DISCONTINUED | OUTPATIENT
Start: 2020-07-09 | End: 2020-07-09 | Stop reason: HOSPADM

## 2020-07-09 RX ORDER — LIDOCAINE WITH 8.4% SOD BICARB 0.9%(10ML)
SYRINGE (ML) INJECTION AS NEEDED
Status: DISCONTINUED | OUTPATIENT
Start: 2020-07-09 | End: 2020-07-09 | Stop reason: HOSPADM

## 2020-07-09 NOTE — DISCHARGE INSTRUCTIONS
Epidural Steroid Injection   WHAT YOU NEED TO KNOW:   An epidural steroid injection (ALBER) is a procedure to inject steroid medicine into the epidural space  The epidural space is between your spinal cord and vertebrae  Steroids reduce inflammation and fluid buildup in your spine that may be causing pain  You may be given pain medicine along with the steroids  ACTIVITY  · Do not drive or operate machinery today  · No strenuous activity today - bending, lifting, etc   · You may resume normal activites starting tomorrow - start slowly and as tolerated  · You may shower today, but no tub baths or hot tubs  · You may have numbness for several hours from the local anesthetic  Please use caution and common sense, especially with weight-bearing activities  CARE OF THE INJECTION SITE  · If you have soreness or pain, apply ice to the area today (20 minutes on/20 minutes off)  · Starting tomorrow, you may use warm, moist heat or ice if needed  · You may have an increase or change in your discomfort for 36-48 hours after your treatment  · Apply ice and continue with any pain medication you have been prescribed  · Notify [facility] if you have any of the following: redness, drainage, swelling, headache, stiff neck or fever above 100°F     SPECIAL INSTRUCTIONS  · Our office will contact you in approximately 7 days for a progress report  MEDICATIONS  · Continue to take all routine medications  · Our office may have instructed you to hold some medications  If you have a problem specifically related to your procedure, please call our office at [phone]  Problems not related to your procedure should be directed to your primary care physician

## 2020-07-09 NOTE — OP NOTE
ATTENDING PHYSICIAN:  Mayra Gaitan MD     PROCEDURE:  Lumbar interlaminar midline epidural steroid injection with steroid and local anesthetic under fluoroscopy at the L4-L5 level  PREPROCEDURE DIAGNOSIS:  Low back pain  POSTPROCEDURE DIAGNOSIS:  Low back pain  ANESTHESIA:  Local     ESTIMATED BLOOD LOSS:  Minimal     COMPLICATIONS:  None  LOCATION:  [facility]  CONSENT:  Today's procedure, its potential benefits as well as its risks and potential side effects were reviewed  Discussed risks of the procedure including bleeding, infection, nerve irritation or damage, reactions to the medications, headache, failure of the pain to improve, and potential worsening of the pain were explained to the patient who verbalized understanding and who wished to proceed  Written informed consent was thereby obtained  DESCRIPTION OF THE PROCEDURE:  After written informed consent was obtained, the patient was taken to the fluoroscopy suite and placed in the prone position  Anatomical landmarks were identified by way of fluoroscopy in multiple views  The skin of the lumbar region was prepped and draped in the usual sterile fashion  Strict aseptic technique was utilized  The skin and subcutaneous tissues at the needle entry site were infiltrated with 3 mL of 1% preservative-free lidocaine using a 25-gauge 1-1/2-inch needle  A 20-gauge Tuohy needle was then incrementally advanced under fluoroscopy using a loss of resistance technique  Upon entering into the epidural space, a positive loss of resistance to air was noted and a characteristic "pop" was felt  Proper placement into the epidural space was confirmed with fluoroscopy in multiple views and by continued loss of resistance after injection of 1 mL of sterile preservative-free normal saline as well as the administration of contrast to delineate the epidural space  There were no paresthesias reported   After negative aspiration for CSF or heme, a 6 mL injectate consisting of 1 mL of Depo-Medrol 80 mg/mL and 1 mL of Depo-Medrol 40 mg/mL mixed with 4 mL of preservative-free normal saline was slowly injected  The patient tolerated the procedure well and all needles were removed with the tips intact  Hemostasis was maintained  There were no apparent paresthesias or complications  The skin was wiped clean and a Band-Aid was placed as appropriate  The patient was monitored for an appropriate period of time following the procedure and remained hemodynamically stable and neurovascularly intact following the procedure  The patient was ultimately discharged to home with supervision in good condition and instructed to call the office in a few days for an update or sooner as warranted  I was present and participated in all key and critical portions of this procedure      Jere Parsons MD  7/9/2020  1:51 PM

## 2020-07-11 LAB — SARS-COV-2 RNA SPEC QL NAA+PROBE: NOT DETECTED

## 2020-07-16 ENCOUNTER — TELEPHONE (OUTPATIENT)
Dept: PAIN MEDICINE | Facility: CLINIC | Age: 40
End: 2020-07-16

## 2020-07-24 ENCOUNTER — HOSPITAL ENCOUNTER (OUTPATIENT)
Dept: RADIOLOGY | Facility: HOSPITAL | Age: 40
Discharge: HOME | End: 2020-07-24
Payer: COMMERCIAL

## 2020-07-24 VITALS — BODY MASS INDEX: 28.68 KG/M2 | WEIGHT: 168 LBS | HEIGHT: 64 IN

## 2020-07-24 DIAGNOSIS — R92.2 DENSE BREAST TISSUE ON MAMMOGRAM: ICD-10-CM

## 2020-07-24 PROCEDURE — 76377 3D RENDER W/INTRP POSTPROCES: CPT

## 2020-07-24 PROCEDURE — 76641 ULTRASOUND BREAST COMPLETE: CPT

## 2020-08-07 ENCOUNTER — OFFICE VISIT (OUTPATIENT)
Dept: FAMILY MEDICINE CLINIC | Facility: CLINIC | Age: 40
End: 2020-08-07
Payer: COMMERCIAL

## 2020-08-07 VITALS
RESPIRATION RATE: 16 BRPM | HEIGHT: 64 IN | OXYGEN SATURATION: 99 % | DIASTOLIC BLOOD PRESSURE: 60 MMHG | HEART RATE: 83 BPM | TEMPERATURE: 97.7 F | SYSTOLIC BLOOD PRESSURE: 98 MMHG | BODY MASS INDEX: 29.53 KG/M2 | WEIGHT: 173 LBS

## 2020-08-07 DIAGNOSIS — Z00.00 ROUTINE ADULT HEALTH MAINTENANCE: Primary | ICD-10-CM

## 2020-08-07 PROCEDURE — 99396 PREV VISIT EST AGE 40-64: CPT | Performed by: FAMILY MEDICINE

## 2020-08-07 RX ORDER — LORATADINE 10 MG
TABLET,DISINTEGRATING ORAL
COMMUNITY
Start: 2020-07-23

## 2020-08-07 NOTE — LETTER
August 7, 2020     Patient: Kerline King   YOB: 1980   Date of Visit: 8/7/2020       To Whom it May Concern:    Brittny Torres is under my professional care  She was seen in my office on 8/7/2020  After my medical evaluation, it is advised the Karine's goal weight range remain between 165-170 pounds  If you have any questions or concerns, please don't hesitate to call  Sincerely,          Jj Cobian MD

## 2020-08-07 NOTE — PROGRESS NOTES
[facility] YONY Dan 38 y o  female   DATE: 2020     Assessment and Plan:  38 y o  female exam      1  Health Maintenance  - Colon Cancer Screening: Not at increased risk, start at age 38  - Cervical Cancer Screenin16-negative cotesting-f/w Gyn, due for repeat Pap in   - Breast Cancer Screening: Normal b/l US 2020, repeat mammo in 1 year per Gyn  - Labs: as below  - Immunizations: Reviewed  Recommend yearly flu vaccine  TdaP UTD 2019   - Note provided for [facility] stating that her healthy weight goal can be b/w 165-170 lbs  2  Other diagnoses addressed today:   Problem List Items Addressed This Visit        Other    Routine adult health maintenance - Primary    Relevant Orders    Lipid Panel with Direct LDL reflex    Comprehensive metabolic panel    Uric acid          Immunizations and preventive care screenings were discussed with patient today  Appropriate education was printed on patient's after visit summary  Counseling:  · Alcohol/drug use: discussed moderation in alcohol intake, the recommendations for healthy alcohol use, and avoidance of illicit drug use  · Dental Health: discussed importance of regular tooth brushing, flossing, and dental visits  · Injury prevention: discussed safety/seat belts, safety helmets, smoke detectors, carbon dioxide detectors, and smoking near bedding or upholstery  · Exercise: the importance of regular exercise/physical activity was discussed  Recommend exercise 3-5 times per week for at least 30 minutes  BMI Counseling: Body mass index is 29 33 kg/m²  The BMI is above normal  Nutrition recommendations include consuming healthier snacks  Exercise recommendations include exercising 3-5 times per week  Follow up next physical in 1 year  Subjective:    Matthew Macedo is a 38 y o  female and is here for a comprehensive physical exam    Acute Complaints: None   Recently sprained her left ankle and has been healging well  Works at [employer]  , has kids  F/w pain medicine for chronic low back pain and had L4-L5 epidural last month and has had 100% pain relief since and is sleeping better    Diet and Physical Activity  · Diet/Nutrition: does follow a well balanced diet  She does [employer] and her lowest was 156, highest 232  Currently she is fluctuating from 160-170s  · Exercise: gets regular exercise    General Health  · Vision: no vision problems and goes for regular eye exams  · Dental: regular dental visits  Gyn Health:  F/w Dr Mirtha Awan, no menstrual period on [facility] IUD (2 5-3 years ago)    Histories Updated and Reviewed 8/7/2020:  Patient's Medications   New Prescriptions    No medications on file   Previous Medications    FLUTICASONE (FLONASE) 50 MCG/ACT NASAL SPRAY    1 spray into each nostril daily    LEVONORGESTREL (KYLEENA) 19 5 MG INTRAUTERINE DEVICE    1 Intra Uterine Device by Intrauterine route once    LORATADINE (ALAVERT) 10 MG DISSOLVABLE TABLET       Modified Medications    No medications on file   Discontinued Medications    LORATADINE (CLARITIN) 10 MG TABLET    Take by mouth    NAPROXEN (NAPROSYN) 500 MG TABLET    Take 1 tablet (500 mg total) by mouth 2 (two) times a day with meals for 10 days     No Known Allergies  Past Medical History:   Diagnosis Date    Abnormal Pap smear of cervix     Allergic rhinitis     last assessed-12/31/2012    Anemia     Chronic pain syndrome 6/11/2020    SANDEEP III (cervical intraepithelial neoplasia grade III) with severe dysplasia     LEEP cone biopsy for SANDEEP-3 2/25/04    Done at the hospital    Gestational diabetes     antepartum condition or prior complicated delivery    Impacted cerumen of both ears     last assessed-6/13/2012    Low back pain     Oligomenorrhea     Spinal stenosis     Vaginal candidiasis     last assessed-11/20/2013    Varicella     chickenpox as a child    Vertigo     last assessed-8/1/2013     Social History     Socioeconomic History    Marital status: /Civil Union     Spouse name: Not on file    Number of children: 1    Years of education: Not on file    Highest education level: Not on file   Occupational History    Not on file   Social Needs    Financial resource strain: Not on file    Food insecurity     Worry: Not on file     Inability: Not on file    Transportation needs     Medical: Not on file     Non-medical: Not on file   Tobacco Use    Smoking status: Never Smoker    Smokeless tobacco: Never Used   Substance and Sexual Activity    Alcohol use: Yes     Alcohol/week: 3 0 standard drinks     Types: 3 Glasses of wine per week     Binge frequency: Weekly    Drug use: No    Sexual activity: Yes     Partners: Male     Birth control/protection: I U D     Lifestyle    Physical activity     Days per week: Not on file     Minutes per session: Not on file    Stress: Not on file   Relationships    Social connections     Talks on phone: Not on file     Gets together: Not on file     Attends Advent service: Not on file     Active member of club or organization: Not on file     Attends meetings of clubs or organizations: Not on file     Relationship status: Not on file    Intimate partner violence     Fear of current or ex partner: Not on file     Emotionally abused: Not on file     Physically abused: Not on file     Forced sexual activity: Not on file   Other Topics Concern    Not on file   Social History Narrative    Caffeine use    Mu-ism Affiliation [employer] ([facility])    Uses safety equipment-seatbelts     Immunization History   Administered Date(s) Administered    INFLUENZA 11/05/2014, 10/15/2015, 01/17/2018, 01/17/2018, 11/17/2018, 11/17/2018, 10/22/2019    Influenza TIV (IM) 10/24/2013    Tdap 01/01/2009, 06/14/2019    Tuberculin Skin Test-PPD Intradermal 10/22/2013, 03/04/2014, 08/31/2016       Objective:  BP 98/60   Pulse 83   Temp 97 7 °F (36 5 °C)   Resp 16   Ht 5' 4 4" (1 636 m)   Wt 78 5 kg (173 lb)   SpO2 99%   BMI 29 33 kg/m²   Physical Exam  Vitals signs reviewed  Constitutional:       General: She is not in acute distress  Appearance: She is well-developed  She is not diaphoretic  HENT:      Head: Normocephalic and atraumatic  Mouth/Throat:      Pharynx: No oropharyngeal exudate  Eyes:      General:         Right eye: No discharge  Left eye: No discharge  Pupils: Pupils are equal, round, and reactive to light  Neck:      Musculoskeletal: Normal range of motion and neck supple  Vascular: No JVD  Cardiovascular:      Rate and Rhythm: Normal rate and regular rhythm  Heart sounds: Normal heart sounds  No murmur  Pulmonary:      Effort: Pulmonary effort is normal  No respiratory distress  Breath sounds: Normal breath sounds  No stridor  No wheezing  Abdominal:      General: Bowel sounds are normal       Palpations: Abdomen is soft  Tenderness: There is no abdominal tenderness  There is no guarding or rebound  Musculoskeletal: Normal range of motion  General: No tenderness  Feet:    Skin:     General: Skin is warm and dry  Findings: No erythema  Neurological:      Mental Status: She is alert and oriented to person, place, and time  Psychiatric:         Behavior: Behavior normal          Patient Care Team:  Enma Giron MD as PCP - General (Family Medicine)  MD Enma Maddox MD    Note: Portions of the record may have been created with voice recognition software  Occasional wrong word or "sound a like" substitutions may have occurred due to the inherent limitations of voice recognition software  Read the chart carefully and recognize, using context, where substitutions have occurred

## 2020-08-28 ENCOUNTER — APPOINTMENT (OUTPATIENT)
Dept: LAB | Facility: MEDICAL CENTER | Age: 40
End: 2020-08-28
Payer: COMMERCIAL

## 2020-08-28 DIAGNOSIS — Z00.00 ROUTINE ADULT HEALTH MAINTENANCE: ICD-10-CM

## 2020-08-28 LAB
ALBUMIN SERPL BCP-MCNC: 3.6 G/DL (ref 3.5–5)
ALP SERPL-CCNC: 56 U/L (ref 46–116)
ALT SERPL W P-5'-P-CCNC: 19 U/L (ref 12–78)
ANION GAP SERPL CALCULATED.3IONS-SCNC: 4 MMOL/L (ref 4–13)
AST SERPL W P-5'-P-CCNC: 19 U/L (ref 5–45)
BILIRUB SERPL-MCNC: 0.38 MG/DL (ref 0.2–1)
BUN SERPL-MCNC: 13 MG/DL (ref 5–25)
CALCIUM SERPL-MCNC: 8.4 MG/DL (ref 8.3–10.1)
CHLORIDE SERPL-SCNC: 111 MMOL/L (ref 100–108)
CHOLEST SERPL-MCNC: 149 MG/DL (ref 50–200)
CO2 SERPL-SCNC: 24 MMOL/L (ref 21–32)
CREAT SERPL-MCNC: 0.74 MG/DL (ref 0.6–1.3)
GFR SERPL CREATININE-BSD FRML MDRD: 101 ML/MIN/1.73SQ M
GLUCOSE P FAST SERPL-MCNC: 78 MG/DL (ref 65–99)
HDLC SERPL-MCNC: 59 MG/DL
LDLC SERPL CALC-MCNC: 78 MG/DL (ref 0–100)
POTASSIUM SERPL-SCNC: 4 MMOL/L (ref 3.5–5.3)
PROT SERPL-MCNC: 7.2 G/DL (ref 6.4–8.2)
SODIUM SERPL-SCNC: 139 MMOL/L (ref 136–145)
TRIGL SERPL-MCNC: 61 MG/DL
URATE SERPL-MCNC: 3.8 MG/DL (ref 2–6.8)

## 2020-08-28 PROCEDURE — 36415 COLL VENOUS BLD VENIPUNCTURE: CPT

## 2020-08-28 PROCEDURE — 80061 LIPID PANEL: CPT

## 2020-08-28 PROCEDURE — 84550 ASSAY OF BLOOD/URIC ACID: CPT

## 2020-08-28 PROCEDURE — 80053 COMPREHEN METABOLIC PANEL: CPT

## 2020-10-05 ENCOUNTER — OFFICE VISIT (OUTPATIENT)
Dept: PAIN MEDICINE | Facility: CLINIC | Age: 40
End: 2020-10-05
Payer: COMMERCIAL

## 2020-10-05 ENCOUNTER — TELEPHONE (OUTPATIENT)
Dept: PAIN MEDICINE | Facility: CLINIC | Age: 40
End: 2020-10-05

## 2020-10-05 VITALS — HEIGHT: 64 IN | BODY MASS INDEX: 29.19 KG/M2 | WEIGHT: 171 LBS

## 2020-10-05 DIAGNOSIS — M48.061 SPINAL STENOSIS OF LUMBAR REGION WITHOUT NEUROGENIC CLAUDICATION: ICD-10-CM

## 2020-10-05 DIAGNOSIS — G89.29 CHRONIC BILATERAL LOW BACK PAIN WITH BILATERAL SCIATICA: ICD-10-CM

## 2020-10-05 DIAGNOSIS — M54.42 CHRONIC BILATERAL LOW BACK PAIN WITH BILATERAL SCIATICA: ICD-10-CM

## 2020-10-05 DIAGNOSIS — M54.41 CHRONIC BILATERAL LOW BACK PAIN WITH BILATERAL SCIATICA: ICD-10-CM

## 2020-10-05 DIAGNOSIS — M54.16 LUMBAR RADICULOPATHY: ICD-10-CM

## 2020-10-05 DIAGNOSIS — G89.4 CHRONIC PAIN SYNDROME: Primary | ICD-10-CM

## 2020-10-05 PROCEDURE — 99214 OFFICE O/P EST MOD 30 MIN: CPT | Performed by: ANESTHESIOLOGY

## 2020-11-03 ENCOUNTER — PROCEDURE VISIT (OUTPATIENT)
Dept: FAMILY MEDICINE CLINIC | Facility: CLINIC | Age: 40
End: 2020-11-03
Payer: COMMERCIAL

## 2020-11-03 VITALS
BODY MASS INDEX: 29.71 KG/M2 | SYSTOLIC BLOOD PRESSURE: 118 MMHG | OXYGEN SATURATION: 97 % | HEART RATE: 88 BPM | HEIGHT: 64 IN | WEIGHT: 174 LBS | TEMPERATURE: 97.5 F | DIASTOLIC BLOOD PRESSURE: 62 MMHG | RESPIRATION RATE: 18 BRPM

## 2020-11-03 DIAGNOSIS — H61.23 BILATERAL HEARING LOSS DUE TO CERUMEN IMPACTION: Primary | ICD-10-CM

## 2020-11-03 PROCEDURE — 69210 REMOVE IMPACTED EAR WAX UNI: CPT | Performed by: FAMILY MEDICINE

## 2020-11-03 RX ORDER — ASPIRIN 81 MG
TABLET, DELAYED RELEASE (ENTERIC COATED) ORAL
COMMUNITY
Start: 2020-10-29

## 2020-12-22 ENCOUNTER — IMMUNIZATIONS (OUTPATIENT)
Dept: FAMILY MEDICINE CLINIC | Facility: HOSPITAL | Age: 40
End: 2020-12-22
Payer: COMMERCIAL

## 2020-12-22 DIAGNOSIS — Z23 ENCOUNTER FOR IMMUNIZATION: ICD-10-CM

## 2020-12-22 PROCEDURE — 91300 SARS-COV-2 / COVID-19 MRNA VACCINE (PFIZER-BIONTECH) 30 MCG: CPT

## 2020-12-22 PROCEDURE — 0001A SARS-COV-2 / COVID-19 MRNA VACCINE (PFIZER-BIONTECH) 30 MCG: CPT

## 2021-01-05 ENCOUNTER — TELEMEDICINE (OUTPATIENT)
Dept: FAMILY MEDICINE CLINIC | Facility: CLINIC | Age: 41
End: 2021-01-05
Payer: COMMERCIAL

## 2021-01-05 DIAGNOSIS — B34.9 VIRAL INFECTION, UNSPECIFIED: ICD-10-CM

## 2021-01-05 DIAGNOSIS — B34.9 VIRAL INFECTION, UNSPECIFIED: Primary | ICD-10-CM

## 2021-01-05 PROCEDURE — U0003 INFECTIOUS AGENT DETECTION BY NUCLEIC ACID (DNA OR RNA); SEVERE ACUTE RESPIRATORY SYNDROME CORONAVIRUS 2 (SARS-COV-2) (CORONAVIRUS DISEASE [COVID-19]), AMPLIFIED PROBE TECHNIQUE, MAKING USE OF HIGH THROUGHPUT TECHNOLOGIES AS DESCRIBED BY CMS-2020-01-R: HCPCS | Performed by: FAMILY MEDICINE

## 2021-01-05 PROCEDURE — 99214 OFFICE O/P EST MOD 30 MIN: CPT | Performed by: FAMILY MEDICINE

## 2021-01-05 NOTE — PROGRESS NOTES
COVID-19 Virtual Visit     Assessment/Plan:    Problem List Items Addressed This Visit     None         Disposition:     I referred patient to one of our centralized sites for a COVID-19 swab  Reviewed guidelines for quarantine  Discussed that she should be presumed positive until a negative test result  Will follow-up pending results of COVID test   Recommended symptomatic treatments such as OTC analgesics, decongestant medications  I have spent 9 minutes directly with the patient  Greater than 50% of this time was spent in counseling/coordination of care regarding: instructions for management and impressions  Encounter provider Hilda Toledo MD    Provider located at [address]  [phone]    Recent Visits  No visits were found meeting these conditions  Showing recent visits within past 7 days and meeting all other requirements     Today's Visits  Date Type Provider Dept   01/05/21 Telemedicine Hilda Toledo MD [facility]   Showing today's visits and meeting all other requirements     Future Appointments  No visits were found meeting these conditions  Showing future appointments within next 150 days and meeting all other requirements      This virtual check-in was done via [employer]ing and patient was informed that this is a secure, HIPAA-compliant platform  She agrees to proceed  Patient agrees to participate in a virtual check in via telephone or video visit instead of presenting to the office to address urgent/immediate medical needs  Patient is aware this is a billable service  After connecting through [facility], the patient was identified by name and date of birth  Karine Dan was informed that this was a telemedicine visit and that the exam was being conducted confidentially over secure lines  Karine Dan acknowledged consent and understanding of privacy and security of the telemedicine visit   I informed the patient that I have reviewed her record in Epic and presented the opportunity for her to ask any questions regarding the visit today  The patient agreed to participate  Subjective:   Roseanna Lamas is a 38 y o  female who is concerned about COVID-19  Date of symptom onset: 1/2/2021    Patient's symptoms include fatigue, nasal congestion, rhinorrhea, sore throat, anosmia and headache  Patient denies fever, chills, malaise, loss of taste, cough, shortness of breath, chest tightness, abdominal pain, nausea, vomiting, diarrhea and myalgias  Exposure:   Contact with a person who is under investigation (PUI) for or who is positive for COVID-19 within the last 14 days?: No    Hospitalized recently for fever and/or lower respiratory symptoms?: No      Currently a healthcare worker that is involved in direct patient care?: No      Works in a special setting where the risk of COVID-19 transmission may be high? (this may include long-term care, correctional and senior care facilities; homeless shelters; assisted-living facilities and group homes ): No      Resident in a special setting where the risk of COVID-19 transmission may be high? (this may include long-term care, correctional and senior care facilities; homeless shelters; assisted-living facilities and group homes ): No      Her most bothersome symptoms are congestion, rhinorrhea  She felt like her symptoms related allergies so she started taking her allergy medication again  She works at [employer]  She has no known sick contacts or COVID exposures      Lab Results   Component Value Date    SARSCOV2 Negative 07/09/2020    SARSCOV2 Not Detected 07/05/2020     Past Medical History:   Diagnosis Date    Abnormal Pap smear of cervix     Allergic rhinitis     last assessed-12/31/2012    Anemia     Chronic pain syndrome 6/11/2020    SANDEEP III (cervical intraepithelial neoplasia grade III) with severe dysplasia     LEEP cone biopsy for SANDEEP-3 04  Done at the hospital    Gestational diabetes     antepartum condition or prior complicated delivery    Impacted cerumen of both ears     last assessed-2012    Low back pain     Oligomenorrhea     Spinal stenosis     Vaginal candidiasis     last assessed-2013    Varicella     chickenpox as a child    Vertigo     last assessed-2013     Past Surgical History:   Procedure Laterality Date    CERVICAL BIOPSY  W/ LOOP ELECTRODE EXCISION  2004    Pathololgy with SANDEEP-2 to 3, with free margins but dysplasia extended to within 1mm of endocervical margin  2004     SECTION, LOW TRANSVERSE  2009    Primary low transverse  for arrest in the active phase at 4 cm with unengaged vertex despite hours of labor    COLPOSCOPY W/ BIOPSY / CURETTAGE  2003    EPIDURAL BLOCK INJECTION N/A 2020    Procedure: L4-L5 LUMBAR EPIDURAL STEROID INJECTION (53700); Surgeon: Thaddeus Saldana MD;  Location: [facility] MAIN OR;  Service: Pain Management     FOOT SURGERY Right     TOOTH EXTRACTION       Current Outpatient Medications   Medication Sig Dispense Refill    carbamide peroxide (Debrox) 6 5 % otic solution       fluticasone (FLONASE) 50 mcg/act nasal spray 1 spray into each nostril daily      levonorgestrel (KYLEENA) 19 5 MG intrauterine device 1 Intra Uterine Device by Intrauterine route once      loratadine (Alavert) 10 MG dissolvable tablet        No current facility-administered medications for this visit  No Known Allergies    Review of Systems   Constitutional: Positive for fatigue  Negative for chills and fever  HENT: Positive for congestion, rhinorrhea and sore throat  Respiratory: Negative for cough, chest tightness and shortness of breath  Gastrointestinal: Negative for abdominal pain, diarrhea, nausea and vomiting  Musculoskeletal: Negative for myalgias  Neurological: Positive for headaches  Objective:     There were no vitals filed for this visit  Physical Exam  Constitutional:       General: She is not in acute distress  Appearance: Normal appearance  She is not ill-appearing or toxic-appearing  HENT:      Nose: Congestion present  Neck:      Musculoskeletal: Normal range of motion  Pulmonary:      Effort: Pulmonary effort is normal  No respiratory distress  Skin:     Coloration: Skin is not pale  Findings: No erythema  Neurological:      Mental Status: She is alert  VIRTUAL VISIT DISCLAIMER    Karine Dan acknowledges that she has consented to an online visit or consultation  She understands that the online visit is based solely on information provided by her, and that, in the absence of a face-to-face physical evaluation by the physician, the diagnosis she receives is both limited and provisional in terms of accuracy and completeness  This is not intended to replace a full medical face-to-face evaluation by the physician  Karine Dan understands and accepts these terms

## 2021-01-06 LAB — SARS-COV-2 RNA SPEC QL NAA+PROBE: DETECTED

## 2021-01-07 ENCOUNTER — TELEPHONE (OUTPATIENT)
Dept: FAMILY MEDICINE CLINIC | Facility: CLINIC | Age: 41
End: 2021-01-07

## 2021-01-07 NOTE — TELEPHONE ENCOUNTER
Call patient COVID 19 test positive  Patient will need to quarantine for a total of 10 days from the onset of her  symptoms  Quarantine can end at that time assuming patient is asymptomatic and afebrile off antipyretics times 24 hours    advised to call if any changes

## 2021-01-07 NOTE — TELEPHONE ENCOUNTER
Patient informed  Onset of symptoms was 1/2/21  Still symptomatic with a nonproductive cough as well as congestion and loss of smell  10 days would be 1/12/21  Already has follow up scheduled for tomorrow with Dr Indio Camarillo  Reviewed infection prevention precautions per [facility] guidelines  Told patient to call office if symptoms worsen or do not subside by day 9  Suggested she continue to monitor symptoms through day 14

## 2021-01-08 ENCOUNTER — TELEMEDICINE (OUTPATIENT)
Dept: FAMILY MEDICINE CLINIC | Facility: CLINIC | Age: 41
End: 2021-01-08
Payer: COMMERCIAL

## 2021-01-08 DIAGNOSIS — U07.1 INFECTION DUE TO SEVERE ACUTE RESPIRATORY SYNDROME CORONAVIRUS 2 (SARS-COV-2): Primary | ICD-10-CM

## 2021-01-08 PROCEDURE — 99213 OFFICE O/P EST LOW 20 MIN: CPT | Performed by: FAMILY MEDICINE

## 2021-01-08 NOTE — PROGRESS NOTES
COVID-19 Virtual Visit     Assessment/Plan:    Problem List Items Addressed This Visit     None      Visit Diagnoses     Infection due to severe acute respiratory syndrome coronavirus 2 (SARS-CoV-2)    -  Primary         Disposition:     I recommended continued isolation until at least 24 hours have passed since recovery defined as resolution of fever without the use of fever-reducing medications AND improvement in COVID symptoms AND 10 days have passed since onset of symptoms (or 10 days have passed since date of first positive viral diagnostic test for asymptomatic patients)  Reviewed guidelines for isolation, symptomatic relief  She should reschedule her second COVID vaccine after isolation has completed  I have spent 13 minutes directly with the patient  Greater than 50% of this time was spent in counseling/coordination of care regarding: instructions for management and patient and family education  Encounter provider Jitendra Auguste MD    Provider located at [address] [id number] [phone]    Recent Visits  Date Type Provider Dept   01/07/21 Telephone MD [facility] Cordon   [facility]   Showing recent visits within past 7 days and meeting all other requirements     Today's Visits  Date Type Provider Dept   01/08/21 Telemedicine MD Prince Goddard   Showing today's visits and meeting all other requirements     Future Appointments  No visits were found meeting these conditions  Showing future appointments within next 150 days and meeting all other requirements      This virtual check-in was done via [employer] and patient was informed that this is not a secure, HIPAA-compliant platform  She agrees to proceed      Patient agrees to participate in a virtual check in via telephone or video visit instead of presenting to the office to address urgent/immediate medical needs  Patient is aware this is a billable service  After connecting through [facility], the patient was identified by name and date of birth  Karine Dan was informed that this was a telemedicine visit and that the exam was being conducted confidentially over secure lines  My office door was closed  No one else was in the room  Karine Dan acknowledged consent and understanding of privacy and security of the telemedicine visit  I informed the patient that I have reviewed her record in Epic and presented the opportunity for her to ask any questions regarding the visit today  The patient agreed to participate  Subjective:   Carol Ann Bush is a 38 y o  female who has been screened for COVID-19  Symptom change since last report: improving  Patient's symptoms include fatigue, nasal congestion, rhinorrhea, loss of taste (diminshed), cough and abdominal pain  Patient denies fever, chills, malaise, sore throat, anosmia, shortness of breath, chest tightness, nausea, vomiting, diarrhea, myalgias and headaches  Karine has been staying home and has isolated themselves in her home  She is taking care to not share personal items and is cleaning all surfaces that are touched often, like counters, tabletops, and doorknobs using household cleaning sprays or wipes  She is wearing a mask when she leaves her room  Date of symptom onset: 1/2/2021  Date of positive COVID-19 PCR: 1/5/2021    She has been taking Mucinex, which has been helpful  Overall symptoms are improving  Lab Results   Component Value Date    SARSCOV2 Detected (A) 01/05/2021     Past Medical History:   Diagnosis Date    Abnormal Pap smear of cervix     Allergic rhinitis     last assessed-12/31/2012    Anemia     Chronic pain syndrome 6/11/2020    SANDEEP III (cervical intraepithelial neoplasia grade III) with severe dysplasia     LEEP cone biopsy for SANDEEP-3 2/25/04    Done at the hospital    Gestational diabetes     antepartum condition or prior complicated delivery    Impacted cerumen of both ears     last assessed-2012    Low back pain     Oligomenorrhea     Spinal stenosis     Vaginal candidiasis     last assessed-2013    Varicella     chickenpox as a child    Vertigo     last assessed-2013     Past Surgical History:   Procedure Laterality Date    CERVICAL BIOPSY  W/ LOOP ELECTRODE EXCISION  2004    Pathololgy with SANDEEP-2 to 3, with free margins but dysplasia extended to within 1mm of endocervical margin  2004     SECTION, LOW TRANSVERSE  2009    Primary low transverse  for arrest in the active phase at 4 cm with unengaged vertex despite hours of labor    COLPOSCOPY W/ BIOPSY / CURETTAGE  2003    EPIDURAL BLOCK INJECTION N/A 2020    Procedure: L4-L5 LUMBAR EPIDURAL STEROID INJECTION (89313); Surgeon: Dorita Bernabe MD;  Location: [facility] MAIN OR;  Service: Pain Management     FOOT SURGERY Right     TOOTH EXTRACTION       Current Outpatient Medications   Medication Sig Dispense Refill    carbamide peroxide (Debrox) 6 5 % otic solution       fluticasone (FLONASE) 50 mcg/act nasal spray 1 spray into each nostril daily      levonorgestrel (KYLEENA) 19 5 MG intrauterine device 1 Intra Uterine Device by Intrauterine route once      loratadine (Alavert) 10 MG dissolvable tablet        No current facility-administered medications for this visit  No Known Allergies    Review of Systems   Constitutional: Positive for fatigue  Negative for chills and fever  HENT: Positive for congestion and rhinorrhea  Negative for sore throat  Respiratory: Positive for cough  Negative for chest tightness and shortness of breath  Gastrointestinal: Positive for abdominal pain  Negative for diarrhea, nausea and vomiting  Musculoskeletal: Negative for myalgias  Neurological: Negative for headaches  Objective: There were no vitals filed for this visit      Physical Exam  Constitutional:       General: She is not in acute distress  Appearance: Normal appearance  She is not ill-appearing or toxic-appearing  HENT:      Nose: Nose normal  No congestion  Mouth/Throat:      Mouth: Mucous membranes are moist    Eyes:      Extraocular Movements: Extraocular movements intact  Conjunctiva/sclera: Conjunctivae normal    Neck:      Musculoskeletal: Normal range of motion  No neck rigidity  Pulmonary:      Effort: Pulmonary effort is normal  No respiratory distress  Comments: Able to speak in complete sentences without difficulty  Neurological:      Mental Status: She is alert  VIRTUAL VISIT DISCLAIMER    Karine Dan acknowledges that she has consented to an online visit or consultation  She understands that the online visit is based solely on information provided by her, and that, in the absence of a face-to-face physical evaluation by the physician, the diagnosis she receives is both limited and provisional in terms of accuracy and completeness  This is not intended to replace a full medical face-to-face evaluation by the physician  Karine Dan understands and accepts these terms

## 2021-01-12 ENCOUNTER — IMMUNIZATIONS (OUTPATIENT)
Dept: FAMILY MEDICINE CLINIC | Facility: HOSPITAL | Age: 41
End: 2021-01-12

## 2021-01-12 DIAGNOSIS — Z23 ENCOUNTER FOR IMMUNIZATION: ICD-10-CM

## 2021-01-12 PROCEDURE — 91300 SARS-COV-2 / COVID-19 MRNA VACCINE (PFIZER-BIONTECH) 30 MCG: CPT

## 2021-01-12 PROCEDURE — 0002A SARS-COV-2 / COVID-19 MRNA VACCINE (PFIZER-BIONTECH) 30 MCG: CPT

## 2021-01-13 ENCOUNTER — TELEPHONE (OUTPATIENT)
Dept: FAMILY MEDICINE CLINIC | Facility: CLINIC | Age: 41
End: 2021-01-13

## 2021-01-13 NOTE — TELEPHONE ENCOUNTER
Fever is one of the sided effects of the vaccine, basically it means it is working since the immune system is developing antibodies causing an immune response-commonly fever, muscle ache, fatigue, headaches, soreness at injection site  people tend to have a more robust immunogenic response after the second vaccine 
Patient was quarantining & had no symptoms  She went for her 2nd COVID vaccine yesterday & started with a fever last night of 100 6 
Please advise
Spoke with patient, gave message 
Eye

## 2021-02-09 ENCOUNTER — TELEMEDICINE (OUTPATIENT)
Dept: FAMILY MEDICINE CLINIC | Facility: CLINIC | Age: 41
End: 2021-02-09
Payer: COMMERCIAL

## 2021-02-09 DIAGNOSIS — L24.0 IRRITANT CONTACT DERMATITIS DUE TO DETERGENT: Primary | ICD-10-CM

## 2021-02-09 PROBLEM — Z00.00 ROUTINE ADULT HEALTH MAINTENANCE: Status: RESOLVED | Noted: 2019-06-14 | Resolved: 2021-02-09

## 2021-02-09 PROCEDURE — 99213 OFFICE O/P EST LOW 20 MIN: CPT | Performed by: FAMILY MEDICINE

## 2021-02-09 RX ORDER — TRIAMCINOLONE ACETONIDE 1 MG/G
CREAM TOPICAL 2 TIMES DAILY
Qty: 30 G | Refills: 0 | Status: SHIPPED | OUTPATIENT
Start: 2021-02-09

## 2021-02-09 NOTE — PROGRESS NOTES
Virtual Regular Visit    Assessment/Plan:    Problem List Items Addressed This Visit     None      Visit Diagnoses     Irritant contact dermatitis due to detergent    -  Primary    Relevant Medications    triamcinolone (KENALOG) 0 1 % cream        Patient with contact dermatitis, likely from liquid soap and alcohol based hand  is as she works in healthcare  Recommended avoidance of creams, lotions, soaps, detergents with added fragrances and dyes  Recommended use of good moisturizing creams in avoidance of prolonged exposure to hot water  Recommended topical corticosteroid cream, send in low potency triamcinolone cream to her pharmacy to take for up to 7 days and then use as needed  Follow-up if symptoms worsen or do not improve  Reason for visit is   Chief Complaint   Patient presents with    Virtual Regular Visit        Encounter provider Edie Bryan MD    Provider located at [place] [address]  [phone]      Recent Visits  No visits were found meeting these conditions  Showing recent visits within past 7 days and meeting all other requirements     Today's Visits  Date Type Provider Dept   02/09/21 Telemedicine Edie Bryan MD [facility]   Showing today's visits and meeting all other requirements     Future Appointments  No visits were found meeting these conditions  Showing future appointments within next 150 days and meeting all other requirements        The patient was identified by name and date of birth  Karine Dan was informed that this is a telemedicine visit and that the visit is being conducted through [facility] and patient was informed that this is a secure, HIPAA-compliant platform  She agrees to proceed     My office door was closed  No one else was in the room  She acknowledged consent and understanding of privacy and security of the video platform   The patient has agreed to participate and understands they can discontinue the visit at any time  Patient is aware this is a billable service  Subjective  Elizabeth Torres is a 42 y o  female who presents for virtual sick visit   HPI     Symptoms started about 2 to 3 weeks ago, have worsened  She reports significant hand pruritis  She reports that the pruritis woke her up in the middle of the night last night  She started using Gold Bold lotion, which does help some  She did stop using hand lotion with fragrances  She has been washing her hands a lot  She does have a red rash on her right thumb  She has been using hydrocortisone cream sporadically  She reports that cold helps, hot water exacerbates the pruritis  She denies new exposures, new creams, lotions, soaps, detergents  Past Medical History:   Diagnosis Date    Abnormal Pap smear of cervix     Allergic rhinitis     last assessed-2012    Anemia     Chronic pain syndrome 2020    SANDEEP III (cervical intraepithelial neoplasia grade III) with severe dysplasia     LEEP cone biopsy for SANDEEP-3 04  Done at the hospital    Gestational diabetes     antepartum condition or prior complicated delivery    Impacted cerumen of both ears     last assessed-2012    Low back pain     Oligomenorrhea     Spinal stenosis     Vaginal candidiasis     last assessed-2013    Varicella     chickenpox as a child    Vertigo     last assessed-2013       Past Surgical History:   Procedure Laterality Date    CERVICAL BIOPSY  W/ LOOP ELECTRODE EXCISION  2004    Pathololgy with SANDEEP-2 to 3, with free margins but dysplasia extended to within 1mm of endocervical margin    2004     SECTION, LOW TRANSVERSE  2009    Primary low transverse  for arrest in the active phase at 4 cm with unengaged vertex despite hours of labor    COLPOSCOPY W/ BIOPSY / CURETTAGE  2003    EPIDURAL BLOCK INJECTION N/A 2020    Procedure: L4-L5 LUMBAR EPIDURAL STEROID INJECTION (33445); Surgeon: Domi Padilla MD;  [facility] MA[facility];  Service: Pain Management     FOOT SURGERY Right     TOOTH EXTRACTION         Current Outpatient Medications   Medication Sig Dispense Refill    carbamide peroxide (Debrox) 6 5 % otic solution       fluticasone (FLONASE) 50 mcg/act nasal spray 1 spray into each nostril daily      levonorgestrel (KYLEENA) 19 5 MG intrauterine device 1 Intra Uterine Device by Intrauterine route once      loratadine (Alavert) 10 MG dissolvable tablet       triamcinolone (KENALOG) 0 1 % cream Apply topically 2 (two) times a day 30 g 0     No current facility-administered medications for this visit  No Known Allergies    Review of Systems   Skin: Positive for rash  pruritis       Video Exam    There were no vitals filed for this visit  Physical Exam  Constitutional:       General: She is not in acute distress  Appearance: Normal appearance  She is normal weight  She is not ill-appearing or diaphoretic  HENT:      Head: Normocephalic and atraumatic  Right Ear: External ear normal       Left Ear: External ear normal       Nose: Nose normal       Mouth/Throat:      Mouth: Mucous membranes are moist    Eyes:      Extraocular Movements: Extraocular movements intact  Conjunctiva/sclera: Conjunctivae normal    Neck:      Musculoskeletal: Normal range of motion and neck supple  Pulmonary:      Effort: Pulmonary effort is normal  No respiratory distress  Skin:     Findings: Rash (erythematous macular papular rash on hands) present  No erythema  Neurological:      Mental Status: She is alert     Psychiatric:         Mood and Affect: Mood normal          Behavior: Behavior normal           I spent 10 minutes with patient today in which greater than 50% of the time was spent in counseling/coordination of care regarding impressions, instructions for managment      VIRTUAL VISIT DISCLAIMER    Karine Dan acknowledges that she has consented to an online visit or consultation  She understands that the online visit is based solely on information provided by her, and that, in the absence of a face-to-face physical evaluation by the physician, the diagnosis she receives is both limited and provisional in terms of accuracy and completeness  This is not intended to replace a full medical face-to-face evaluation by the physician  Karine Dan understands and accepts these terms

## 2021-03-08 ENCOUNTER — TELEPHONE (OUTPATIENT)
Dept: OBGYN CLINIC | Facility: CLINIC | Age: 41
End: 2021-03-08

## 2021-04-12 ENCOUNTER — ANNUAL EXAM (OUTPATIENT)
Dept: OBGYN CLINIC | Facility: CLINIC | Age: 41
End: 2021-04-12
Payer: COMMERCIAL

## 2021-04-12 VITALS
DIASTOLIC BLOOD PRESSURE: 70 MMHG | BODY MASS INDEX: 30.05 KG/M2 | WEIGHT: 176 LBS | SYSTOLIC BLOOD PRESSURE: 122 MMHG | HEIGHT: 64 IN

## 2021-04-12 DIAGNOSIS — Z01.419 ENCOUNTER FOR GYNECOLOGICAL EXAMINATION WITH PAPANICOLAOU SMEAR OF CERVIX: ICD-10-CM

## 2021-04-12 DIAGNOSIS — R92.2 DENSE BREAST TISSUE ON MAMMOGRAM: ICD-10-CM

## 2021-04-12 DIAGNOSIS — Z97.5 IUD CONTRACEPTION: ICD-10-CM

## 2021-04-12 DIAGNOSIS — Z12.31 ENCOUNTER FOR SCREENING MAMMOGRAM FOR BREAST CANCER: ICD-10-CM

## 2021-04-12 DIAGNOSIS — Z01.419 WOMEN'S ANNUAL ROUTINE GYNECOLOGICAL EXAMINATION: Primary | ICD-10-CM

## 2021-04-12 PROBLEM — R92.30 DENSE BREAST TISSUE ON MAMMOGRAM: Status: ACTIVE | Noted: 2021-04-12

## 2021-04-12 PROCEDURE — 87624 HPV HI-RISK TYP POOLED RSLT: CPT | Performed by: OBSTETRICS & GYNECOLOGY

## 2021-04-12 PROCEDURE — 99396 PREV VISIT EST AGE 40-64: CPT | Performed by: OBSTETRICS & GYNECOLOGY

## 2021-04-12 PROCEDURE — G0145 SCR C/V CYTO,THINLAYER,RESCR: HCPCS | Performed by: OBSTETRICS & GYNECOLOGY

## 2021-04-12 NOTE — PATIENT INSTRUCTIONS
Intrauterine Device   WHAT YOU NEED TO KNOW:   What is an intrauterine device (IUD)? An IUD is a type of birth control that is inserted into your uterus  It is a small, flexible piece of plastic with a string on the end  It is inserted and removed by your healthcare provider  IUDs prevent sperm from reaching or fertilizing an egg  IUDs also prevent a fertilized egg from attaching to the uterus and developing into a fetus  What are the most common types of IUDs? Your healthcare provider will recommend the type of IUD that is right for you  This is based on your age and if you have had a child  If you have not had a child, a smaller IUD will be used  · A copper IUD  slowly releases a small amount of copper into your uterus  This IUD can remain in place for up to 10 years  · A hormone-releasing IUD  slowly releases a small amount of progesterone into your uterus  Progesterone is a hormone that is made by your body to help control your periods  This IUD can remain in place for 3 to 5 years  What are the advantages of an IUD? · An IUD is 98% to 99% effective in preventing pregnancy  · The IUD can be removed by your healthcare provider if you decide to have a baby  You may be able to get pregnant as soon as the IUD is removed  · An IUD protects you from pregnancy right after it is inserted  · You do not have to stop sexual activity to insert it  You do not have to remember to take your birth control pill  · Copper IUDs are safer for some women than oral birth control pills  Examples include women who smoke or have a history of blood clots  · Hormone-releasing IUDs may decrease certain health problems  Examples include bleeding and cramping that happen with your monthly period  What are the disadvantages of an IUD? · There is a small chance that you could get pregnant  Sometimes the IUD cannot be removed after you get pregnant   This increases your risk of a miscarriage or an ectopic pregnancy  Ectopic pregnancy is when the fertilized egg starts to grow somewhere other than your uterus  · An IUD does not protect you from sexually transmitted infections  · You may have cramps during the first weeks after you get the IUD  · A copper IUD may cause your monthly period to be heavier or more painful  This is more common within the first 3 months after you get the IUD  You may need to have your IUD removed if your bleeding or pain becomes severe  You may have spotting between periods  · There is a small risk of an infection within the first 20 days after the IUD is placed  Infection can lead to pelvic inflammatory disease  This can cause infertility  · Your uterus may tear when the IUD is inserted  The IUD may slip part or all of the way out of your uterus  How is the IUD inserted? · The IUD is usually inserted during your monthly period  This may help decrease the amount of discomfort you have during the procedure  It also helps make sure that you are not pregnant  You will be asked to lie down and place your feet in stirrups  Your healthcare provider will gently insert a speculum into your vagina  This is the same tool used during a Pap smear  The speculum allows your healthcare provider to see inside your vagina to your cervix  The cervix is the opening of your uterus  · Your healthcare provider will clean your cervix with an antiseptic solution to prevent infection  You may be given numbing medicine  A long plastic tube is gently passed through your cervix and into your uterus  This tube has the IUD inside of it  The IUD is pushed out of the tube and into your uterus  You may have cramps as the IUD is inserted  The tube is removed after the IUD is in place  How can I make sure my IUD is still in place? An IUD has a string made of plastic thread  One to 2 inches of this string hangs into your vagina   You cannot see this string, and it should not cause problems when you have sex  Check your IUD string every 3 days for the first 3 months after it is inserted  After that, check the string after each monthly period  Do the following to check the placement of your IUD:  · Wash your hands with soap and warm water  Dry them with a clean towel  · Bend your knees and squat low to the ground  · Gently put your index finger inside your vagina  The cervix is at the top of the vagina and feels like the tip of your nose  Feel for the IUD string  Do not pull on the string  You should not be able to feel the firm plastic of the IUD itself  · Wash your hands after you check your IUD string  Where can I find more information? · [facility] [address] [place]  Phone: 9- [phone]  Web Address: https://[employer]ida[employer]    When should I seek immediate care? · You have severe pain or bleeding during your period  · You have a fever and severe abdominal pain  When should I call my doctor? · You think you are pregnant  · The IUD has come out  · You have bleeding from your vagina after you have sex, and it is not your period  · You have pain during sex  · You cannot feel the IUD string, the string feels longer, or you feel the plastic of the IUD itself  · You have vaginal discharge that is green, yellow, or has a foul odor  · You have questions or concerns about your condition or care  CARE AGREEMENT:   You have the right to help plan your care  Learn about your health condition and how it may be treated  Discuss treatment options with your healthcare providers to decide what care you want to receive  You always have the right to refuse treatment  The above information is an  only  It is not intended as medical advice for individual conditions or treatments  Talk to your doctor, nurse or pharmacist before following any medical regimen to see if it is safe and effective for you    © Copyright [address] Information is for [employer] use only and may not be sold, redistributed or otherwise used for commercial purposes   All illustrations and images included in CareNotes® are the copyrighted property of A D A M , Inc  or [address] [place], [place] [address]bladimir catrachita

## 2021-04-12 NOTE — PROGRESS NOTES
Assessment/Plan   Diagnoses and all orders for this visit:    Women's annual routine gynecological examination    Encounter for screening mammogram for breast cancer  -     Mammo screening bilateral w 3d & cad; Future    Encounter for gynecological examination with Papanicolaou smear of cervix  -     Liquid-based pap, screening    IUD contraception    Dense breast tissue on mammogram    1  Yearly exam-Pap smear done with HPV testing, self-breast awareness reviewed, calcium/vitamin-D recommendations discussed, mammogram request given, colonoscopy recommended age 36-53  2  Kody Fragoso on 2/23/18  Patient counseled that this is good for up to 5 years  She is very happy with this device  She will call with any issues  3  History of severe dysplasia-status post LEEP cone biopsy 2004 with negative testing since then  Most recent Pap/HPV 12/28/16  Pap with HPV done today with disposition as per findings  4  Secondary oligomenorrhea-related to IUD  Notes rare spotting every 1-2 months time  5  Dense breast tissue-noted on mammogram   Patient counseled about [facility] breast screening algorithm  Had 3D mammogram June 2020 and ABUS July 2020  She was given 3D request   She may consider ABUS going forward again  6  Other- had mole removed from the skin above his previous, told it was HPV related  We will check HPV with Pap test   Son is almost 15years old, my congratulations given  She was given information on Gardasil which she can address with the pediatrician  Follow-up 1 year for yearly exam or as needed    Subjective   Patient ID: Candace Hale is a 42 y o  female  Vitals:    04/12/21 1604   BP: 122/70     Patient was seen today for yearly exam   Please see assessment plan for details        The following portions of the patient's history were reviewed and updated as appropriate: allergies, current medications, past family history, past medical history, past social history, past surgical history and problem list   Past Medical History:   Diagnosis Date    Abnormal Pap smear of cervix     Allergic rhinitis     last assessed-2012    Anemia     Chronic pain syndrome 2020    SANDEEP III (cervical intraepithelial neoplasia grade III) with severe dysplasia     LEEP cone biopsy for SANDEEP-3 04  Done at the hospital    Gestational diabetes     antepartum condition or prior complicated delivery    Impacted cerumen of both ears     last assessed-2012    Low back pain     Oligomenorrhea     Spinal stenosis     Vaginal candidiasis     last assessed-2013    Varicella     chickenpox as a child    Vertigo     last assessed-2013     Past Surgical History:   Procedure Laterality Date    CERVICAL BIOPSY  W/ LOOP ELECTRODE EXCISION  2004    Pathololgy with SANDEEP-2 to 3, with free margins but dysplasia extended to within 1mm of endocervical margin  2004     SECTION, LOW TRANSVERSE  2009    Primary low transverse  for arrest in the active phase at 4 cm with unengaged vertex despite hours of labor    COLPOSCOPY W/ BIOPSY / CURETTAGE  2003    EPIDURAL BLOCK INJECTION N/A 2020    Procedure: L4-L5 LUMBAR EPIDURAL STEROID INJECTION (05142);   Surgeon: Kian Coronado MD;  [facility] MA[facility];  Service: Pain Management     FOOT SURGERY Right     TOOTH EXTRACTION       OB History    Para Term  AB Living   1 1 1     1   SAB TAB Ectopic Multiple Live Births           1      # Outcome Date GA Lbr Gregory/2nd Weight Sex Delivery Anes PTL Lv   1 Term               Obstetric Comments    1       Current Outpatient Medications:     carbamide peroxide (Debrox) 6 5 % otic solution, , Disp: , Rfl:     fluticasone (FLONASE) 50 mcg/act nasal spray, 1 spray into each nostril daily, Disp: , Rfl:     levonorgestrel (KYLEENA) 19 5 MG intrauterine device, 1 Intra Uterine Device by Intrauterine route once, Disp: , Rfl:    loratadine (Alavert) 10 MG dissolvable tablet, , Disp: , Rfl:     triamcinolone (KENALOG) 0 1 % cream, Apply topically 2 (two) times a day, Disp: 30 g, Rfl: 0  No Known Allergies  Social History     Socioeconomic History    Marital status: /Civil Union     Spouse name: None    Number of children: 1    Years of education: None    Highest education level: None   Occupational History    None   Social Needs    Financial resource strain: None    Food insecurity     Worry: None     Inability: None    Transportation needs     Medical: None     Non-medical: None   Tobacco Use    Smoking status: Never Smoker    Smokeless tobacco: Never Used   Substance and Sexual Activity    Alcohol use: Yes     Alcohol/week: 2 0 standard drinks     Types: 2 Glasses of wine per week     Frequency: 2-3 times a week     Drinks per session: 1 or 2     Binge frequency: Never    Drug use: No    Sexual activity: Yes     Partners: Male     Birth control/protection: I U D     Lifestyle    Physical activity     Days per week: None     Minutes per session: None    Stress: None   Relationships    Social connections     Talks on phone: None     Gets together: None     Attends Jew service: None     Active member of club or organization: None     Attends meetings of clubs or organizations: None     Relationship status: None    Intimate partner violence     Fear of current or ex partner: None     Emotionally abused: None     Physically abused: None     Forced sexual activity: None   Other Topics Concern    None   Social History Narrative    Caffeine use    Jain Affiliation Still ([address] [place] [address])    Uses safety equipment-seatbelts     Family History   Problem Relation Age of Onset    Hypertension Father     Kidney failure Father     Hypertension Maternal Grandmother     Lung cancer Maternal Grandmother     Cancer Maternal Grandfather         bladder    Hypertension Maternal Grandfather     Lung cancer Paternal Grandmother     Heart disease Paternal Grandfather     No Known Problems Maternal Aunt     No Known Problems Maternal Aunt     No Known Problems Paternal Aunt     No Known Problems Brother     No Known Problems Son     No Known Problems Maternal Uncle     No Known Problems Paternal Uncle     Skin cancer Mother        Review of Systems   Constitutional: Negative for chills, diaphoresis, fatigue and fever  Respiratory: Negative for apnea, cough, chest tightness, shortness of breath and wheezing  Cardiovascular: Negative for chest pain, palpitations and leg swelling  Gastrointestinal: Negative for abdominal distention, abdominal pain, anal bleeding, constipation, diarrhea, nausea, rectal pain and vomiting  Genitourinary: Negative for difficulty urinating, dyspareunia, dysuria, frequency, hematuria, menstrual problem, pelvic pain, urgency, vaginal bleeding, vaginal discharge and vaginal pain  Musculoskeletal: Negative for arthralgias, back pain and myalgias  Skin: Negative for color change and rash  Neurological: Negative for dizziness, syncope, light-headedness, numbness and headaches  Hematological: Negative for adenopathy  Does not bruise/bleed easily  Psychiatric/Behavioral: Negative for dysphoric mood and sleep disturbance  The patient is not nervous/anxious  Objective   Physical Exam    Objective      /70 (BP Location: Left arm, Patient Position: Sitting, Cuff Size: Adult)   Ht 5' 4" (1 626 m)   Wt 79 8 kg (176 lb)   BMI 30 21 kg/m²     General:   alert and oriented, in no acute distress   Neck: normal to inspection and palpation   Breast: normal appearance, no masses or tenderness   Heart:    Lungs:    Abdomen: soft, non-tender, without masses or organomegaly   Vulva: normal   Vagina: Without erythema or lesions or discharge  Normal   Cervix: Without lesions or discharge or cervicitis  No Cervical motion tenderness    IUD string seen at a length of 1 5 cm   Uterus: top normal size, anteverted, non-tender   Adnexa: no mass, fullness, tenderness   Rectum: negative    Psych:  Normal mood and affect   Skin:  Without obvious lesions   Eyes: symmetric, with normal movements and reactivity   Musculoskeletal:  Normal muscle tone and movements appreciated

## 2021-04-15 LAB
HPV HR 12 DNA CVX QL NAA+PROBE: NEGATIVE
HPV16 DNA CVX QL NAA+PROBE: NEGATIVE
HPV18 DNA CVX QL NAA+PROBE: NEGATIVE

## 2021-04-16 ENCOUNTER — PROCEDURE VISIT (OUTPATIENT)
Dept: DERMATOLOGY | Facility: CLINIC | Age: 41
End: 2021-04-16

## 2021-04-16 DIAGNOSIS — Z41.1 ENCOUNTER FOR COSMETIC LASER PROCEDURE: Primary | ICD-10-CM

## 2021-04-16 PROCEDURE — LHRLIP1 LASER HAIR UPPER LIP 1 TX: Performed by: STUDENT IN AN ORGANIZED HEALTH CARE EDUCATION/TRAINING PROGRAM

## 2021-04-16 PROCEDURE — LHRCHIN1 LASER HAIR CHIN 1 TX: Performed by: STUDENT IN AN ORGANIZED HEALTH CARE EDUCATION/TRAINING PROGRAM

## 2021-04-16 NOTE — PATIENT INSTRUCTIONS
LASER-HAIR REDUCTION INSTRUCTIONS    Prep-Care (what to do before your appointment)     Area must be shaved 24 hours prior to your appointment   The closer the shave the better  If it is your first time, leave a small patch of hair so that your specialist can exam it  (Your Laser Artist will shave it down before beginning the treatment!)   For bikini services, dont shave the part where you want to keep the hair   No makeup/lotion/deodorant on the day of your appointment (on treatment area)   Stay out of direct sunlight for at least 3 days prior to your appointment (& 3 days after)   Do not use self-ana or spray tan products for at least 2 weeks before your treatment to avoid potential injury   Avoid drinking more than 2 alcoholic beverages 24 hours before your treatment    Avoid waxing/threading/tweezing in the area for at least 4 weeks  Shaving is ok! Post-Care     Redness & Bumps are normal    Immediately after your treatment, redness & bumps at the treatment area are common; these may last up to 2 hours or longer  It is normal for the treated area to feel like a sunburn for a few hours  You should use a cold compress if the sensitivity continues  If there is any crusting, apply an antibiotic cream  Darker pigmented skin may have more discomfort than lighter skin & may persist longer   Cleanse the area treated gently   The treated area may be washed gently with a mild soap  Skin should be patted dry & not rubbed during the first 48 hours   No makeup & lotion/moisturizer/deodorant for the first 24 hours   Keep the treated area clean & dry, if further redness or irritation persists, skip your makeup & moisturizer, & deodorant (for underarms) until the irritation has subsided   Dead hairs will begin to shed 5-30 days after your treatment   Stubble, representing dead hair being shed from the hair follicle, will appear within 5-30 days from the treatment date   that is normal & they will fall out quickly   Exfoliate to speed up hair shedding   Anywhere from 5-30 days after the treatment, shedding of the hair may occur & this may appear as new hair growth  It is not new hair growth, but the dead hair pushing its way out of the follicle  You can help the hair come out by using light exfoliation with a washcloth  & shaving   Avoid the sun   Avoid sun exposure to reduce the chance of dark or light spots for 2 months  Use sunscreen (spf 30 or higher) at all times throughout the treatment period & for 1-2 months following   Do not pick/scratch/wax/thread/tweeze the area   Avoid picking or scratching the treated skin  do not use any other hair removal methods or products, other than shaving, on the treated area during the course of your laser treatments, as it will prevent you from achieving the best results   Hair growth varies   On average, most will experience a level of hairless happiness after around 6, up to 9 sessions which will result in not even checking for stubble      Facial Masks recommendations: [place] and [place]

## 2021-04-16 NOTE — PROGRESS NOTES
LASER HAIR REMOVAL to Upper lip and chin with Gentle Max Pro   Treatment # 1    1095 nm Nd:yag   Spot size: 12 mm  Fluence 36 J/cm2  Pulse duration 10 ms  1 Hz  DCD: 40/30/-  Dark/coarse/III    The skin was cleaned and shaved prior to treatment  Patient tolerated the procedure well with no immediate complications  Ice was used post treatment and topical steroid applied  Post care instructions given to patient in writing  Laser safety protective eye wear worn by the patient and all personnel in the treatment room during the procedure to reduce the chance of damage to the eye  Next treatment in 4 weeks  Patient scheduled Tuesday 5/18/21 as an OVS per Dr Maya Roads  DO NOT BILL  30% off promotion      Scribe Attestation    I,:  Victorino Kocher am acting as a scribe while in the presence of the attending physician :       I,:  Chandana Bravo MD personally performed the services described in this documentation    as scribed in my presence :

## 2021-04-19 LAB
LAB AP GYN PRIMARY INTERPRETATION: NORMAL
Lab: NORMAL

## 2021-05-18 ENCOUNTER — OFFICE VISIT (OUTPATIENT)
Dept: DERMATOLOGY | Facility: CLINIC | Age: 41
End: 2021-05-18

## 2021-05-18 DIAGNOSIS — Z41.1 ENCOUNTER FOR COSMETIC LASER PROCEDURE: Primary | ICD-10-CM

## 2021-05-18 PROCEDURE — LHRLIP1 LASER HAIR UPPER LIP 1 TX: Performed by: STUDENT IN AN ORGANIZED HEALTH CARE EDUCATION/TRAINING PROGRAM

## 2021-05-18 PROCEDURE — LHRCHIN1 LASER HAIR CHIN 1 TX: Performed by: STUDENT IN AN ORGANIZED HEALTH CARE EDUCATION/TRAINING PROGRAM

## 2021-05-18 NOTE — PATIENT INSTRUCTIONS
LASER-HAIR REDUCTION INSTRUCTIONS    Prep-Care (what to do before your appointment)     Area must be shaved 24 hours prior to your appointment   The closer the shave the better   For bikini services, dont shave the part where you want to keep the hair   No makeup/lotion/deodorant on the day of your appointment (on treatment area)   Stay out of direct sunlight for at least 3 days prior to your appointment (& 3 days after)   Do not use self-ana or spray tan products for at least 2 weeks before your treatment to avoid potential injury   Avoid drinking more than 2 alcoholic beverages 24 hours before your treatment    Avoid waxing/threading/tweezing in the area for at least 4 weeks  Shaving is ok! Post-Care     Redness & Bumps are normal    Immediately after your treatment, redness & bumps at the treatment area are common; these may last up to 2 hours or longer  It is normal for the treated area to feel like a sunburn for a few hours  You should use a cold compress if the sensitivity continues  If there is any crusting, apply an antibiotic cream  Darker pigmented skin may have more discomfort than lighter skin & may persist longer   Cleanse the area treated gently   The treated area may be washed gently with a mild soap  Skin should be patted dry & not rubbed during the first 48 hours   No makeup & lotion/moisturizer/deodorant for the first 24 hours   Keep the treated area clean & dry, if further redness or irritation persists, skip your makeup & moisturizer, & deodorant (for underarms) until the irritation has subsided   Dead hairs will begin to shed 5-30 days after your treatment   Stubble, representing dead hair being shed from the hair follicle, will appear within 5-30 days from the treatment date  that is normal & they will fall out quickly   Exfoliate to speed up hair shedding     Anywhere from 5-30 days after the treatment, shedding of the hair may occur & this may appear as new hair growth  It is not new hair growth, but the dead hair pushing its way out of the follicle  You can help the hair come out by using light exfoliation with a washcloth  & shaving   Avoid the sun   Avoid sun exposure to reduce the chance of dark or light spots for 2 months  Use sunscreen (spf 30 or higher) at all times throughout the treatment period & for 1-2 months following   Do not pick/scratch/wax/thread/tweeze the area   Avoid picking or scratching the treated skin  do not use any other hair removal methods or products, other than shaving, on the treated area during the course of your laser treatments, as it will prevent you from achieving the best results   Hair growth varies   On average, most will experience a level of hairless happiness after around 6, up to 9 sessions which will result in not even checking for stubble

## 2021-05-18 NOTE — PROGRESS NOTES
LASER HAIR REMOVAL to Upper lip and chin with Gentle Max Pro   Treatment # 2    1095 nm Nd:yag   Spot size: 20  mm  Fluence 19 J/cm2  Pulse duration 10 ms  1 Hz  DCD: 40/30/-  Dark/coarse/III    The skin was cleaned and shaved prior to treatment  Patient tolerated the procedure well with no immediate complications  Ice was used post treatment and topical steroid applied  Post care instructions given to patient in writing  Laser safety protective eye wear worn by the patient and all personnel in the treatment room during the procedure to reduce the chance of damage to the eye  Next treatment in 4 weeks  THIS IS A COSMETIC PATIENT WHO PAID OUT OF POCKET AT TIME OF VISIT  DO NOT BILL  30% off promotion      Scribe Attestation    I,:  Armando Older am acting as a scribe while in the presence of the attending physician :       I,:  Yanira Calzada MD personally performed the services described in this documentation    as scribed in my presence :

## 2021-06-02 ENCOUNTER — TELEPHONE (OUTPATIENT)
Dept: DERMATOLOGY | Facility: CLINIC | Age: 41
End: 2021-06-02

## 2021-06-02 NOTE — TELEPHONE ENCOUNTER
Returned pts call from previous day requesting to change her upcoming appt from 6/15 to 6/25  Left voicemail to call the office back

## 2021-07-06 NOTE — PROGRESS NOTES
Assessment/Plan:  1  Pain of left heel  XR heel / calcaneus 2+ vw left       Karine has posterior heel pain with no evidence of fracture on her x-ray  She does not have tenderness along the plantar fascia or Achilles insertion  I do think she has an element of repetitive injury with her exercise  There is a concern for possible stress injury at this time  Given the short duration of her pain I advised her to modify her footwear and change her physical activity for few weeks  She can continue to exercise but should not continue with repetitive pounding such as Blessing or running at this time  She can swim or do a stationary bike for now for exercise  She will also try adding heel cups and sneakers more often  I will see her back in 3-4 weeks for repeat evaluation and discuss if we need an MRI at that time to evaluate for stress injury  Subjective:   Jane Mrogan is a 35 y o  female who presents to the office for evaluation for left-sided heel pain  She denies any particular injury or trauma but she does participate in regular physical activity and exercise  She runs several days a week and does a Blessing class  She states that she has been feeling increased discomfort at the base of her heel for almost 1 month  She states that she does not have much pain at all when exercising and then has pain afterwards  She will rest from exercise and the pain will feel much better but and returns when she begins exercising again  She denies any history of heel pain in the past   She denies any pain with first steps in the morning  She denies any pain in the plantar fascia or arch of her foot  She does have a history of flat feet and does wear arch support inserts  She denies any Achilles or calf pain  Review of Systems   Constitutional: Negative for chills, fever and unexpected weight change  HENT: Negative for hearing loss, nosebleeds and sore throat      Eyes: Negative for pain, redness and visual disturbance  Respiratory: Negative for cough, shortness of breath and wheezing  Cardiovascular: Negative for chest pain, palpitations and leg swelling  Gastrointestinal: Negative for abdominal pain, nausea and vomiting  Endocrine: Negative for polydipsia and polyuria  Genitourinary: Negative for dysuria and hematuria  Musculoskeletal:        See HPI   Skin: Negative for rash and wound  Neurological: Positive for dizziness  Negative for numbness and headaches  Psychiatric/Behavioral: Negative for decreased concentration and suicidal ideas  The patient is not nervous/anxious  Past Medical History:   Diagnosis Date    Abnormal Pap smear of cervix     Allergic rhinitis     last assessed-2012    Anemia     SANDEEP III (cervical intraepithelial neoplasia grade III) with severe dysplasia     LEEP cone biopsy for SANDEEP-3 04  Done at the hospital    Gestational diabetes     antepartum condition or prior complicated delivery    Impacted cerumen of both ears     last assessed-2012    Oligomenorrhea     Vaginal candidiasis     last assessed-2013    Varicella     chickenpox as a child    Vertigo     last assessed-2013       Past Surgical History:   Procedure Laterality Date    CERVICAL BIOPSY  W/ LOOP ELECTRODE EXCISION  2004    Pathololgy with SANDEEP-2 to 3, with free margins but dysplasia extended to within 1mm of endocervical margin    2004     SECTION, LOW TRANSVERSE  2009    Primary low transverse  for arrest in the active phase at 4 cm with unengaged vertex despite hours of labor    COLPOSCOPY W/ BIOPSY / CURETTAGE  2003    FOOT SURGERY Right     TOOTH EXTRACTION         Family History   Problem Relation Age of Onset    Hypertension Father     Kidney failure Father     Hypertension Maternal Grandmother     Lung cancer Maternal Grandmother     Cancer Maternal Grandfather         bladder    Hypertension Maternal Grandfather     Lung cancer Paternal Grandmother     Heart disease Paternal Grandfather     No Known Problems Maternal Aunt     No Known Problems Maternal Aunt     No Known Problems Paternal Aunt        Social History     Occupational History    Not on file   Tobacco Use    Smoking status: Never Smoker    Smokeless tobacco: Never Used   Substance and Sexual Activity    Alcohol use: Yes    Drug use: No    Sexual activity: Yes     Partners: Male     Birth control/protection: IUD         Current Outpatient Medications:     fluticasone (FLONASE) 50 mcg/act nasal spray, 1 spray into each nostril daily, Disp: , Rfl:     levonorgestrel (KYLEENA) 19 5 MG intrauterine device, 1 Intra Uterine Device by Intrauterine route once, Disp: , Rfl:     loratadine (CLARITIN) 10 mg tablet, Take by mouth, Disp: , Rfl:     naproxen (NAPROSYN) 500 mg tablet, TAKE 1 TABLET (500 MG TOTAL) BY MOUTH 2 (TWO) TIMES A DAY WITH MEALS  TAKE WITH MEALS , Disp: , Rfl:     ranitidine (ZANTAC) 75 MG tablet, Take 75 mg by mouth 2 (two) times a day, Disp: , Rfl:     No Known Allergies    Objective:  Vitals:    09/05/19 0833   BP: 102/69   Pulse: 66       Ortho Exam    Physical Exam   Constitutional: She is oriented to person, place, and time  She appears well-developed and well-nourished  HENT:   Head: Normocephalic and atraumatic  Eyes: Pupils are equal, round, and reactive to light  Conjunctivae are normal    Neck: Normal range of motion  Neck supple  Cardiovascular: Normal rate and intact distal pulses  Pulmonary/Chest: Effort normal  No respiratory distress  Musculoskeletal:        Left foot: There is tenderness and bony tenderness  There is no swelling  Feet:    As noted in HPI   Neurological: She is alert and oriented to person, place, and time  Skin: Skin is warm and dry  Psychiatric: She has a normal mood and affect  Her behavior is normal    Vitals reviewed        I have personally reviewed pertinent films in PACS and my interpretation is as follows: Two view x-ray of the left heel demonstrates no evidence of acute fracture  There does appear to be increased bone formation over the posterior calcaneus where the patient's site of pain is  There is also a small plantar heel spur and calcaneal heel spur  No evidence of acute fracture   used

## 2021-07-09 ENCOUNTER — HOSPITAL ENCOUNTER (OUTPATIENT)
Dept: RADIOLOGY | Age: 41
Discharge: HOME | End: 2021-07-09
Payer: COMMERCIAL

## 2021-07-09 VITALS — HEIGHT: 64 IN | WEIGHT: 172 LBS | BODY MASS INDEX: 29.37 KG/M2

## 2021-07-09 DIAGNOSIS — Z12.31 ENCOUNTER FOR SCREENING MAMMOGRAM FOR BREAST CANCER: ICD-10-CM

## 2021-07-09 PROCEDURE — 77063 BREAST TOMOSYNTHESIS BI: CPT

## 2021-07-09 PROCEDURE — 77067 SCR MAMMO BI INCL CAD: CPT

## 2021-07-12 DIAGNOSIS — R92.2 DENSE BREAST TISSUE ON MAMMOGRAM: Primary | ICD-10-CM

## 2021-07-15 ENCOUNTER — TELEPHONE (OUTPATIENT)
Dept: DERMATOLOGY | Facility: CLINIC | Age: 41
End: 2021-07-15

## 2021-09-01 ENCOUNTER — TELEPHONE (OUTPATIENT)
Dept: DERMATOLOGY | Facility: CLINIC | Age: 41
End: 2021-09-01

## 2021-09-01 NOTE — TELEPHONE ENCOUNTER
Billing issue with a HLR pt states that she  received bill for $35 for DOS:  4/16 (pd $140) and 5/18 (pd $140) which apts were both paid by credit card at time of services  I informed pt that we would look into the issue and get this corrected for her  Email sent to practice manager

## 2021-09-01 NOTE — TELEPHONE ENCOUNTER
Spoke with patient and reviewed her billing history  Pt has quoted $140 per visit which includes both locations  I advised her that we would continue to honor that and correct the billing that she received  Informed pt that if she has any issues going forward to reach out to me  Pt satisfied

## 2021-09-02 ENCOUNTER — APPOINTMENT (OUTPATIENT)
Dept: LAB | Facility: CLINIC | Age: 41
End: 2021-09-02

## 2021-09-03 ENCOUNTER — HOSPITAL ENCOUNTER (OUTPATIENT)
Dept: ULTRASOUND IMAGING | Facility: CLINIC | Age: 41
Discharge: HOME | End: 2021-09-03
Payer: COMMERCIAL

## 2021-09-03 VITALS — HEIGHT: 64 IN | BODY MASS INDEX: 29.37 KG/M2 | WEIGHT: 172 LBS

## 2021-09-03 DIAGNOSIS — R92.2 DENSE BREAST TISSUE ON MAMMOGRAM: ICD-10-CM

## 2021-09-03 PROCEDURE — 76641 ULTRASOUND BREAST COMPLETE: CPT

## 2021-11-15 ENCOUNTER — TELEPHONE (OUTPATIENT)
Dept: OBGYN CLINIC | Facility: CLINIC | Age: 41
End: 2021-11-15

## 2021-11-16 ENCOUNTER — IMMUNIZATIONS (OUTPATIENT)
Dept: FAMILY MEDICINE CLINIC | Facility: HOSPITAL | Age: 41
End: 2021-11-16

## 2021-11-16 DIAGNOSIS — Z23 ENCOUNTER FOR IMMUNIZATION: Primary | ICD-10-CM

## 2021-11-16 PROCEDURE — 91300 COVID-19 PFIZER VACC 0.3 ML: CPT

## 2021-11-16 PROCEDURE — 0001A COVID-19 PFIZER VACC 0.3 ML: CPT

## 2022-04-19 ENCOUNTER — ANNUAL EXAM (OUTPATIENT)
Dept: OBGYN CLINIC | Facility: CLINIC | Age: 42
End: 2022-04-19
Payer: COMMERCIAL

## 2022-04-19 VITALS
WEIGHT: 177 LBS | SYSTOLIC BLOOD PRESSURE: 122 MMHG | BODY MASS INDEX: 29.49 KG/M2 | DIASTOLIC BLOOD PRESSURE: 78 MMHG | HEIGHT: 65 IN

## 2022-04-19 DIAGNOSIS — N91.4 SECONDARY OLIGOMENORRHEA: ICD-10-CM

## 2022-04-19 DIAGNOSIS — R92.2 DENSE BREAST TISSUE ON MAMMOGRAM: ICD-10-CM

## 2022-04-19 DIAGNOSIS — Z12.31 ENCOUNTER FOR SCREENING MAMMOGRAM FOR BREAST CANCER: ICD-10-CM

## 2022-04-19 DIAGNOSIS — Z01.419 WOMEN'S ANNUAL ROUTINE GYNECOLOGICAL EXAMINATION: Primary | ICD-10-CM

## 2022-04-19 DIAGNOSIS — Z97.5 IUD CONTRACEPTION: ICD-10-CM

## 2022-04-19 PROCEDURE — 99396 PREV VISIT EST AGE 40-64: CPT | Performed by: OBSTETRICS & GYNECOLOGY

## 2022-04-19 NOTE — PROGRESS NOTES
Assessment/Plan   Diagnoses and all orders for this visit:    Women's annual routine gynecological examination    Encounter for screening mammogram for breast cancer  -     Mammo screening bilateral w 3d & cad; Future    IUD contraception    Dense breast tissue on mammogram    Secondary oligomenorrhea    1  Yearly exam-Pap smear deferred, self-breast awareness reviewed, calcium/vitamin-D recommendations discussed, mammogram request given, colonoscopy recommended after age 39   2  Melissa Agrawal 2/23/18  Patient is very happy with this  She does plan to return in February 2023 for removal with reinsertion  Suggested she call 1-2 months prior to this time to arrange for this  3  Prior history of severe dysplasia-status post LEEP cone biopsy 2004 with negative testing since then  Pap with HPV was negative 4/12/21  Pap was deferred today as per current guidelines with patient agreement  4  Secondary oligomenorrhea-related to IUD  Notes rare bloating with brown discharge every 1-2 months time  5  Dense breast tissue-counseled again about the [facility] breast screening algorithm  Had mammogram 7/9/21 with dense changes  ABUS 9/3/21 which was okay  To continue with testing  6  Other- with mole removed from skin above his penis, was told was HPV related  Pap with HPV was negative last visit  To follow  Follow-up February 2023 for IUD removal with reinsertion  Otherwise, Follow-up 1 year for yearly exam or as needed  Subjective   Patient ID: Dileep Sim is a 36 y o  female  Vitals:    04/19/22 1452   BP: 122/78     Patient was seen today for yearly exam   Please see assessment plan for details        The following portions of the patient's history were reviewed and updated as appropriate: allergies, current medications, past family history, past medical history, past social history, past surgical history and problem list   Past Medical History:   Diagnosis Date    Abnormal Pap smear of cervix     Allergic rhinitis     last assessed-2012    Anemia     Chronic pain syndrome 2020    SANDEEP III (cervical intraepithelial neoplasia grade III) with severe dysplasia     LEEP cone biopsy for SANDEEP-3 04  Done at the hospital    Gestational diabetes     antepartum condition or prior complicated delivery    Impacted cerumen of both ears     last assessed-2012    Low back pain     Oligomenorrhea     Spinal stenosis     Vaginal candidiasis     last assessed-2013    Varicella     chickenpox as a child    Vertigo     last assessed-2013     Past Surgical History:   Procedure Laterality Date    CERVICAL BIOPSY  W/ LOOP ELECTRODE EXCISION  2004    Pathololgy with SANDEEP-2 to 3, with free margins but dysplasia extended to within 1mm of endocervical margin  2004     SECTION, LOW TRANSVERSE  2009    Primary low transverse  for arrest in the active phase at 4 cm with unengaged vertex despite hours of labor    COLPOSCOPY W/ BIOPSY / CURETTAGE  2003    EPIDURAL BLOCK INJECTION N/A 2020    Procedure: L4-L5 LUMBAR EPIDURAL STEROID INJECTION (72070);   Surgeon: Shiraz Adam MD;  [facility] MAIN OR;  Service: Pain Management     FOOT SURGERY Right     TOOTH EXTRACTION       OB History    Para Term  AB Living   1 1 1     1   SAB IAB Ectopic Multiple Live Births           1      # Outcome Date GA Lbr Gregory/2nd Weight Sex Delivery Anes PTL Lv   1 Term               Obstetric Comments    1       Current Outpatient Medications:     carbamide peroxide (Debrox) 6 5 % otic solution, , Disp: , Rfl:     fluticasone (FLONASE) 50 mcg/act nasal spray, 1 spray into each nostril daily, Disp: , Rfl:     levonorgestrel (KYLEENA) 19 5 MG intrauterine device, 1 Intra Uterine Device by Intrauterine route once, Disp: , Rfl:     loratadine (Alavert) 10 MG dissolvable tablet, , Disp: , Rfl:     triamcinolone (KENALOG) 0 1 % cream, Apply topically 2 (two) times a day, Disp: 30 g, Rfl: 0  No Known Allergies  Social History     Socioeconomic History    Marital status: /Civil Union     Spouse name: None    Number of children: 1    Years of education: None    Highest education level: None   Occupational History    None   Tobacco Use    Smoking status: Never Smoker    Smokeless tobacco: Never Used   Vaping Use    Vaping Use: Never used   Substance and Sexual Activity    Alcohol use: Yes     Alcohol/week: 2 0 standard drinks     Types: 2 Glasses of wine per week    Drug use: No    Sexual activity: Yes     Partners: Male     Birth control/protection: I U D  Other Topics Concern    None   Social History Narrative    Caffeine use    Adventism Affiliation [employer])    Uses safety equipment-seatbelts     Social Determinants of Health     Financial Resource Strain: Not on file   Food Insecurity: Not on file   Transportation Needs: Not on file   Physical Activity: Not on file   Stress: Not on file   Social Connections: Not on file   Intimate Partner Violence: Not on file   Housing Stability: Not on file     Family History   Problem Relation Age of Onset    Hypertension Father     Kidney failure Father     Hypertension Maternal Grandmother     Lung cancer Maternal Grandmother     Cancer Maternal Grandfather         bladder    Hypertension Maternal Grandfather     Lung cancer Paternal Grandmother     Heart disease Paternal Grandfather     No Known Problems Maternal Aunt     No Known Problems Maternal Aunt     No Known Problems Paternal Aunt     No Known Problems Brother     No Known Problems Son     No Known Problems Maternal Uncle     No Known Problems Paternal Uncle     Skin cancer Mother        Review of Systems   Constitutional: Negative for chills, diaphoresis, fatigue and fever  Respiratory: Negative for apnea, cough, chest tightness, shortness of breath and wheezing      Cardiovascular: Negative for chest pain, palpitations and leg swelling  Gastrointestinal: Negative for abdominal distention, abdominal pain, anal bleeding, constipation, diarrhea, nausea, rectal pain and vomiting  Genitourinary: Negative for difficulty urinating, dyspareunia, dysuria, frequency, hematuria, menstrual problem, pelvic pain, urgency, vaginal bleeding, vaginal discharge and vaginal pain  Musculoskeletal: Negative for arthralgias, back pain and myalgias  Skin: Negative for color change and rash  Neurological: Negative for dizziness, syncope, light-headedness, numbness and headaches  Hematological: Negative for adenopathy  Does not bruise/bleed easily  Psychiatric/Behavioral: Negative for dysphoric mood and sleep disturbance  The patient is not nervous/anxious  Objective   Physical Exam  OBGyn Exam     Objective      /78 (BP Location: Left arm, Patient Position: Sitting, Cuff Size: Adult)   Ht 5' 5" (1 651 m)   Wt 80 3 kg (177 lb)   BMI 29 45 kg/m²     General:   alert and oriented, in no acute distress   Neck: normal to inspection and palpation   Breast: normal appearance, no masses or tenderness   Heart:    Lungs:    Abdomen: soft, non-tender, without masses or organomegaly   Vulva: normal   Vagina: Without erythema or lesions or discharge  Normal   Cervix: Without lesions or discharge or cervicitis  No Cervical motion tenderness    IUD string seen at a length of 1 cm   Uterus: top normal size, anteverted, non-tender   Adnexa: no mass, fullness, tenderness   Rectum: negative    Psych:  Normal mood and affect   Skin:  Without obvious lesions   Eyes: symmetric, with normal movements and reactivity   Musculoskeletal:  Normal muscle tone and movements appreciated

## 2022-04-19 NOTE — PATIENT INSTRUCTIONS
Intrauterine Device   WHAT YOU NEED TO KNOW:   What is an intrauterine device (IUD)? An IUD is a type of birth control that is inserted into your uterus  It is a small, flexible piece of plastic with a string on the end  It is inserted and removed by your healthcare provider  IUDs prevent sperm from reaching or fertilizing an egg  IUDs also prevent a fertilized egg from attaching to the uterus and developing into a fetus  What are the most common types of IUDs? Your healthcare provider will recommend the type of IUD that is right for you  This is based on your age and if you have had a child  If you have not had a child, a smaller IUD will be used  · A copper IUD  slowly releases a small amount of copper into your uterus  This IUD can remain in place for up to 10 years  · A hormone-releasing IUD  slowly releases a small amount of progesterone into your uterus  Progesterone is a hormone that is made by your body to help control your periods  This IUD can remain in place for 3 to 5 years  What are the advantages of an IUD? · An IUD is 98% to 99% effective in preventing pregnancy  · The IUD can be removed by your healthcare provider if you decide to have a baby  You may be able to get pregnant as soon as the IUD is removed  · An IUD protects you from pregnancy right after it is inserted  · You do not have to stop sexual activity to insert it  You do not have to remember to take your birth control pill  · Copper IUDs are safer for some women than oral birth control pills  Examples include women who smoke or have a history of blood clots  · Hormone-releasing IUDs may decrease certain health problems  Examples include bleeding and cramping that happen with your monthly period  What are the disadvantages of an IUD? · There is a small chance that you could get pregnant  Sometimes the IUD cannot be removed after you get pregnant   This increases your risk of a miscarriage or an ectopic pregnancy  Ectopic pregnancy is when the fertilized egg starts to grow somewhere other than your uterus  · An IUD does not protect you from sexually transmitted infections  · You may have cramps during the first weeks after you get the IUD  · A copper IUD may cause your monthly period to be heavier or more painful  This is more common within the first 3 months after you get the IUD  You may need to have your IUD removed if your bleeding or pain becomes severe  You may have spotting between periods  · There is a small risk of an infection within the first 20 days after the IUD is placed  Infection can lead to pelvic inflammatory disease  This can cause infertility  · Your uterus may tear when the IUD is inserted  The IUD may slip part or all of the way out of your uterus  How is the IUD inserted? · The IUD is usually inserted during your monthly period  This may help decrease the amount of discomfort you have during the procedure  It also helps make sure that you are not pregnant  You will be asked to lie down and place your feet in stirrups  Your healthcare provider will gently insert a speculum into your vagina  This is the same tool used during a Pap smear  The speculum allows your healthcare provider to see inside your vagina to your cervix  The cervix is the opening of your uterus  · Your healthcare provider will clean your cervix with an antiseptic solution to prevent infection  You may be given numbing medicine  A long plastic tube is gently passed through your cervix and into your uterus  This tube has the IUD inside of it  The IUD is pushed out of the tube and into your uterus  You may have cramps as the IUD is inserted  The tube is removed after the IUD is in place  How can I make sure my IUD is still in place? An IUD has a string made of plastic thread  One to 2 inches of this string hangs into your vagina   You cannot see this string, and it should not cause problems when you have sex  Check your IUD string every 3 days for the first 3 months after it is inserted  After that, check the string after each monthly period  Do the following to check the placement of your IUD:  · Wash your hands with soap and warm water  Dry them with a clean towel  · Bend your knees and squat low to the ground  · Gently put your index finger inside your vagina  The cervix is at the top of the vagina and feels like the tip of your nose  Feel for the IUD string  Do not pull on the string  You should not be able to feel the firm plastic of the IUD itself  · Wash your hands after you check your IUD string  Where can I find more information? · [facility] [address] , [address] [place]  Phone: [phone]- [phone]  Web Address: https://[employer]    When should I seek immediate care? · You have severe pain or bleeding during your period  · You have a fever and severe abdominal pain  When should I call my doctor? · You think you are pregnant  · The IUD has come out  · You have bleeding from your vagina after you have sex, and it is not your period  · You have pain during sex  · You cannot feel the IUD string, the string feels longer, or you feel the plastic of the IUD itself  · You have vaginal discharge that is green, yellow, or has a foul odor  · You have questions or concerns about your condition or care  CARE AGREEMENT:   You have the right to help plan your care  Learn about your health condition and how it may be treated  Discuss treatment options with your healthcare providers to decide what care you want to receive  You always have the right to refuse treatment  The above information is an  only  It is not intended as medical advice for individual conditions or treatments  Talk to your doctor, nurse or pharmacist before following any medical regimen to see if it is safe and effective for you    © Copyright [address] 2022 Information is for End User's use only and may not be sold, redistributed or otherwise used for commercial purposes   All illustrations and images included in CareNotes® are the copyrighted property of A D A M , Inc  or [facility] Jami Roque

## 2022-04-28 NOTE — PROGRESS NOTES
[facility]      Date of Service: 22  Primary Care Provider:   Cristopher Chambers MD       Name: Jessica Rodrigues       : 1980       Age:42 y o  Sex: female      MRN: [id number]      Chief Complaint:Physical Exam (yearly physical )     Assessment and Plan:  36 y o  female exam      1  Health Maintenance  - Colon Cancer Screening: start at 38  - Cervical Cancer Screening: follows GYN, normal PAP with negative HPV in 2021, repeat in 5 years  - Breast Cancer Screening: last mammogram done in 2021, ABUS done in September due to dense breasts  - Labs: done previously   - Immunizations: Reviewed  Recommend yearly flu vaccine  2  Other diagnoses addressed today:   Problem List Items Addressed This Visit     None      Visit Diagnoses     Annual physical exam    -  Primary    Relevant Orders    Hemoglobin A1C    Lipid Panel with Direct LDL reflex    BMI 30 0-30 9,adult        Need for hepatitis C screening test        Relevant Orders    Hepatitis C Antibody ([facility])    Bilateral hearing loss due to cerumen impaction        Relevant Orders    Ear cerumen removal (Completed)         Ear cerumen removal    Date/Time: 2022 9:38 AM  Performed by: Cristopher Chambers MD  Authorized by: Cristopher Chambers MD   Universal Protocol:  Consent: Verbal consent obtained  Written consent not obtained  Risks and benefits: risks, benefits and alternatives were discussed  Time out: Immediately prior to procedure a "time out" was called to verify the correct patient, procedure, equipment, support staff and site/side marked as required    Patient understanding: patient states understanding of the procedure being performed  Patient consent: the patient's understanding of the procedure matches consent given  Procedure consent: procedure consent matches procedure scheduled  Relevant documents: relevant documents not present or verified  Test results: test results not available  Site marked: the operative site was not marked  Radiology Images displayed and confirmed  If images not available, report reviewed: imaging studies not available  Required items: required blood products, implants, devices, and special equipment available  Patient identity confirmed: verbally with patient      Patient location:  Clinic  Procedure details:     Local anesthetic:  None    Location:  L ear and R ear    Procedure type: irrigation only      Approach:  External  Post-procedure details:     Complication:  None and macerated skin    Hearing quality:  Improved    Patient tolerance of procedure: Tolerated well, no immediate complications      Immunizations and preventive care screenings were discussed with patient today  Appropriate education was printed on patient's after visit summary  Counseling:  Alcohol/drug use: discussed moderation in alcohol intake, the recommendations for healthy alcohol use, and avoidance of illicit drug use  Dental Health: discussed importance of regular tooth brushing, flossing, and dental visits  Injury prevention: discussed safety/seat belts, safety helmets, smoke detectors, carbon dioxide detectors  Exercise: the importance of regular exercise/physical activity was discussed  Recommend exercise 3-5 times per week for at least 30 minutes  BMI Counseling: Body mass index is 30 17 kg/m²  The BMI is above normal  Nutrition recommendations include decreasing portion sizes, encouraging healthy choices of fruits and vegetables, consuming healthier snacks and reducing intake of saturated and trans fat  Exercise recommendations include moderate physical activity 150 minutes/week and strength training exercises  Rationale for BMI follow-up plan is due to patient being overweight or obese  Depression Screening and Follow-up Plan: Patient was screened for depression during today's encounter  They screened negative with a PHQ-2 score of 0          Follow up next physical in 1 year     Subjective:    Yuki Haas is a 36 y o  female and is here for a comprehensive physical exam      Acute Complaints: None  Her back pain has improved  She is seeing chiropractor which has been helping  Diet and Physical Activity  Diet/Nutrition: does follow a well balanced diet  Exercise: gym, strength training, walking     General Health  Vision: no vision problems and goes for regular eye exams  Dental: regular dental visits  Gyn Health:    OB History        1    Para   1    Term   1            AB        Living   1       SAB        IAB        Ectopic        Multiple        Live Births   1           Obstetric Comments    1              No LMP recorded  Patient has had an implant  Follows GYN, up to date on PAP    Histories Updated and Reviewed 2022:  Patient's Medications   New Prescriptions    No medications on file   Previous Medications    CARBAMIDE PEROXIDE (DEBROX) 6 5 % OTIC SOLUTION        FLUTICASONE (FLONASE) 50 MCG/ACT NASAL SPRAY    1 spray into each nostril daily    LEVONORGESTREL (KYLEENA) 19 5 MG INTRAUTERINE DEVICE    1 Intra Uterine Device by Intrauterine route once    LORATADINE (ALAVERT) 10 MG DISSOLVABLE TABLET        TRIAMCINOLONE (KENALOG) 0 1 % CREAM    Apply topically 2 (two) times a day   Modified Medications    No medications on file   Discontinued Medications    No medications on file     No Known Allergies  Past Medical History:   Diagnosis Date    Abnormal Pap smear of cervix     Allergic rhinitis     last assessed-2012    Anemia     Chronic pain syndrome 2020    SANDEEP III (cervical intraepithelial neoplasia grade III) with severe dysplasia     LEEP cone biopsy for SANDEEP-3 04    Done at the hospital    Gestational diabetes     antepartum condition or prior complicated delivery    Impacted cerumen of both ears     last assessed-2012    Low back pain     Oligomenorrhea     Spinal stenosis     Spinal stenosis of lumbar region without neurogenic claudication 6/14/2019    Vaginal candidiasis     last assessed-11/20/2013    Varicella     chickenpox as a child    Vertigo     last assessed-8/1/2013     Social History     Socioeconomic History    Marital status: /Civil Union     Spouse name: Not on file    Number of children: 1    Years of education: Not on file    Highest education level: Not on file   Occupational History    Not on file   Tobacco Use    Smoking status: Never Smoker    Smokeless tobacco: Never Used   Vaping Use    Vaping Use: Never used   Substance and Sexual Activity    Alcohol use: Yes     Alcohol/week: 2 0 standard drinks     Types: 2 Glasses of wine per week    Drug use: No    Sexual activity: Yes     Partners: Male     Birth control/protection: I U D     Other Topics Concern    Not on file   Social History Narrative    Caffeine use    [employer]iliation [employer] ([address] [place] [address])    Uses safety equipment-seatbelts     Social Determinants of Health     Financial Resource Strain: Not on file   Food Insecurity: Not on file   Transportation Needs: Not on file   Physical Activity: Not on file   Stress: Not on file   Social Connections: Not on file   Intimate Partner Violence: Not on file   Housing Stability: Not on file     Immunization History   Administered Date(s) Administered    COVID-19 PFIZER VACCINE 0 3 ML IM 12/22/2020, 01/12/2021, 11/16/2021, 11/16/2021    INFLUENZA 11/05/2014, 10/15/2015, 01/17/2018, 01/17/2018, 11/17/2018, 11/17/2018, 10/22/2019, 10/22/2019, 10/07/2020, 10/21/2021    Influenza, seasonal, injectable 10/24/2013    Tdap 01/01/2009, 06/14/2019    Tuberculin Skin Test-PPD Intradermal 10/22/2013, 03/04/2014, 08/31/2016       Objective:  /80 (BP Location: Left arm, Patient Position: Sitting, Cuff Size: Standard)   Pulse 86   Temp 97 5 °F (36 4 °C)   Resp 18   Ht 5' 4 5" (1 638 m)   Wt 81 kg (178 lb 8 oz)   SpO2 98%   Breastfeeding No   BMI 30 17 kg/m²   BP Readings from Last 3 Encounters:   04/29/22 104/80   04/19/22 122/78   04/12/21 122/70      Wt Readings from Last 3 Encounters:   04/29/22 81 kg (178 lb 8 oz)   04/19/22 80 3 kg (177 lb)   09/03/21 78 kg (172 lb)      Physical Exam  Constitutional:       General: She is not in acute distress  Appearance: Normal appearance  She is normal weight  She is not ill-appearing or toxic-appearing  HENT:      Head: Normocephalic and atraumatic  Right Ear: Tympanic membrane, ear canal and external ear normal  There is impacted cerumen (removed by lavage)  Left Ear: Tympanic membrane, ear canal and external ear normal  There is impacted cerumen (removed by lavage)  Nose: Nose normal       Mouth/Throat:      Mouth: Mucous membranes are moist    Eyes:      Extraocular Movements: Extraocular movements intact  Conjunctiva/sclera: Conjunctivae normal    Neck:      Vascular: No carotid bruit  Cardiovascular:      Rate and Rhythm: Normal rate and regular rhythm  Pulses: Normal pulses  Heart sounds: Normal heart sounds  No murmur heard  No friction rub  No gallop  Pulmonary:      Effort: Pulmonary effort is normal  No respiratory distress  Breath sounds: Normal breath sounds  No stridor  No wheezing, rhonchi or rales  Abdominal:      General: There is no distension  Palpations: Abdomen is soft  Musculoskeletal:         General: Normal range of motion  Cervical back: Normal range of motion and neck supple  No rigidity or tenderness  Right lower leg: No edema  Left lower leg: No edema  Lymphadenopathy:      Cervical: No cervical adenopathy  Skin:     General: Skin is warm and dry  Findings: No erythema or rash  Neurological:      General: No focal deficit present  Mental Status: She is alert and oriented to person, place, and time     Psychiatric:         Mood and Affect: Mood normal          Behavior: Behavior normal          Lab Results   Component Value Date    HGBA1C 5 1 09/02/2021     Lab Results   Component Value Date    CHOLESTEROL 156 09/02/2021    CHOLESTEROL 149 08/28/2020    CHOLESTEROL 144 08/03/2019     Lab Results   Component Value Date    HDL 61 09/02/2021    HDL 59 08/28/2020    HDL 62 (H) 08/03/2019     Lab Results   Component Value Date    TRIG 58 09/02/2021    TRIG 61 08/28/2020    TRIG 44 08/03/2019     Lab Results   Component Value Date    [place] 95 09/02/2021    [place] 82 08/03/2019    [place] 129 07/23/2013     Lab Results   Component Value Date    LDLCALC 83 09/02/2021         Patient Care Team:  Julianne Álvarez MD as PCP - General (Family Medicine)  MD Julianne Pizarro MD    Note: Portions of the record may have been created with voice recognition software  Occasional wrong word or "sound a like" substitutions may have occurred due to the inherent limitations of voice recognition software  Read the chart carefully and recognize, using context, where substitutions have occurred

## 2022-04-29 ENCOUNTER — OFFICE VISIT (OUTPATIENT)
Dept: FAMILY MEDICINE CLINIC | Facility: CLINIC | Age: 42
End: 2022-04-29
Payer: COMMERCIAL

## 2022-04-29 VITALS
RESPIRATION RATE: 18 BRPM | SYSTOLIC BLOOD PRESSURE: 104 MMHG | WEIGHT: 178.5 LBS | OXYGEN SATURATION: 98 % | HEART RATE: 86 BPM | BODY MASS INDEX: 29.74 KG/M2 | TEMPERATURE: 97.5 F | HEIGHT: 65 IN | DIASTOLIC BLOOD PRESSURE: 80 MMHG

## 2022-04-29 DIAGNOSIS — Z00.00 ANNUAL PHYSICAL EXAM: Primary | ICD-10-CM

## 2022-04-29 DIAGNOSIS — H61.23 BILATERAL HEARING LOSS DUE TO CERUMEN IMPACTION: ICD-10-CM

## 2022-04-29 DIAGNOSIS — Z11.59 NEED FOR HEPATITIS C SCREENING TEST: ICD-10-CM

## 2022-04-29 PROBLEM — M54.16 LUMBAR RADICULOPATHY: Status: RESOLVED | Noted: 2018-07-26 | Resolved: 2022-04-29

## 2022-04-29 PROBLEM — M48.061 SPINAL STENOSIS OF LUMBAR REGION WITHOUT NEUROGENIC CLAUDICATION: Status: RESOLVED | Noted: 2019-06-14 | Resolved: 2022-04-29

## 2022-04-29 PROCEDURE — 99396 PREV VISIT EST AGE 40-64: CPT | Performed by: FAMILY MEDICINE

## 2022-04-29 PROCEDURE — 69209 REMOVE IMPACTED EAR WAX UNI: CPT | Performed by: FAMILY MEDICINE

## 2022-05-17 ENCOUNTER — TELEPHONE (OUTPATIENT)
Dept: FAMILY MEDICINE CLINIC | Facility: CLINIC | Age: 42
End: 2022-05-17

## 2022-05-17 DIAGNOSIS — F40.243 FEAR OF FLYING: Primary | ICD-10-CM

## 2022-05-17 RX ORDER — LORAZEPAM 0.5 MG/1
0.5 TABLET ORAL EVERY 8 HOURS PRN
Qty: 5 TABLET | Refills: 0 | Status: SHIPPED | OUTPATIENT
Start: 2022-05-17

## 2022-05-17 NOTE — TELEPHONE ENCOUNTER
Looks like she has had this before  Will send to pharmacy  In the future patient should have this medication filled at an office visit  PDMP reviewed and appropriate

## 2022-05-17 NOTE — TELEPHONE ENCOUNTER
Patient saw Dr Sheyla Mccabe on 4/29 & forgot to ask fo rmedication for travel  She is a very anxious flyer   She is requesting #3 Ativan be sent to [facility] in [place]

## 2022-06-02 ENCOUNTER — OFFICE VISIT (OUTPATIENT)
Dept: FAMILY MEDICINE CLINIC | Facility: CLINIC | Age: 42
End: 2022-06-02
Payer: COMMERCIAL

## 2022-06-02 VITALS
BODY MASS INDEX: 30.73 KG/M2 | HEART RATE: 67 BPM | TEMPERATURE: 96.8 F | DIASTOLIC BLOOD PRESSURE: 78 MMHG | SYSTOLIC BLOOD PRESSURE: 118 MMHG | OXYGEN SATURATION: 98 % | HEIGHT: 64 IN | WEIGHT: 180 LBS | RESPIRATION RATE: 17 BRPM

## 2022-06-02 DIAGNOSIS — U07.1 INFECTION DUE TO SEVERE ACUTE RESPIRATORY SYNDROME CORONAVIRUS 2 (SARS-COV-2): ICD-10-CM

## 2022-06-02 DIAGNOSIS — R05.9 COUGH: Primary | ICD-10-CM

## 2022-06-02 PROCEDURE — 99213 OFFICE O/P EST LOW 20 MIN: CPT | Performed by: FAMILY MEDICINE

## 2022-06-02 RX ORDER — BENZONATATE 100 MG/1
100 CAPSULE ORAL 3 TIMES DAILY PRN
Qty: 30 CAPSULE | Refills: 0 | Status: SHIPPED | OUTPATIENT
Start: 2022-06-02

## 2022-06-02 NOTE — PROGRESS NOTES
FAMILY MEDICINE PROGRESS NOTE    Date of Service: 22  Primary Care Provider:   Jitendra Auguste MD       Name: Arsh Díaz       : 1980       Age:42 y o  Sex: female      MRN: [id number]      Chief Complaint:Cough (Covid positive on 2022), Nasal Congestion, and itchy eyes       ASSESSMENT and PLAN:  Arsh Díaz is a 36 y o  female with:     Problem List Items Addressed This Visit    None     Visit Diagnoses     Cough    -  Primary    Relevant Medications    benzonatate (TESSALON PERLES) 100 mg capsule    Infection due to severe acute respiratory syndrome coronavirus 2 (SARS-CoV-2)            Patient with lingering symptoms other signs or symptoms of pneumonia other than cough, no fevers, cough is nonproductive  Discussed that symptoms are likely lingering viral symptoms and should continue to resolve with time  Continue with supportive care and symptomatic treatment  SUBJECTIVE:  Arsh Díaz is a 36 y o  female who presents today with a chief complaint of Cough (Covid positive on 2022), Nasal Congestion, and itchy eyes  HPI     She had COVID with symptoms starting May 17 with rhinorrhea, she tested multiple times on May 18 and  with home tests which were negative  Then on May 24 she was in the shower and noticed that she could not smell  She took a test then that was positive  She continues to have cough and congestion  She is taking Flonase, Alavert  Overall symptoms are better  She denies fevers, chills  Review of Systems   Constitutional: Negative for chills, fatigue and fever  HENT: Positive for congestion  Negative for postnasal drip, rhinorrhea and sore throat  Respiratory: Positive for cough and choking  Negative for shortness of breath  I have reviewed the patient's Past Medical History      Current Outpatient Medications:     benzonatate (TESSALON PERLES) 100 mg capsule, Take 1 capsule (100 mg total) by mouth 3 (three) times a day as needed for cough, Disp: 30 capsule, Rfl: 0    carbamide peroxide (Debrox) 6 5 % otic solution, , Disp: , Rfl:     fluticasone (FLONASE) 50 mcg/act nasal spray, 1 spray into each nostril daily, Disp: , Rfl:     levonorgestrel (KYLEENA) 19 5 MG intrauterine device, 1 Intra Uterine Device by Intrauterine route once, Disp: , Rfl:     loratadine (Alavert) 10 MG dissolvable tablet, , Disp: , Rfl:     LORazepam (Ativan) 0 5 mg tablet, Take 1 tablet (0 5 mg total) by mouth every 8 (eight) hours as needed for anxiety, Disp: 5 tablet, Rfl: 0    triamcinolone (KENALOG) 0 1 % cream, Apply topically 2 (two) times a day, Disp: 30 g, Rfl: 0    OBJECTIVE:  /78 (BP Location: Left arm, Patient Position: Sitting, Cuff Size: Standard)   Pulse 67   Temp (!) 96 8 °F (36 °C)   Resp 17   Ht 5' 4" (1 626 m)   Wt 81 6 kg (180 lb)   SpO2 98%   Breastfeeding No   BMI 30 90 kg/m²    BP Readings from Last 3 Encounters:   06/02/22 118/78   04/29/22 104/80   04/19/22 122/78      Wt Readings from Last 3 Encounters:   06/02/22 81 6 kg (180 lb)   04/29/22 81 kg (178 lb 8 oz)   04/19/22 80 3 kg (177 lb)      Physical Exam  Constitutional:       General: She is not in acute distress  Appearance: Normal appearance  She is normal weight  She is not ill-appearing or toxic-appearing  HENT:      Head: Normocephalic and atraumatic  Right Ear: Tympanic membrane, ear canal and external ear normal       Left Ear: Tympanic membrane, ear canal and external ear normal       Nose: Congestion present  Mouth/Throat:      Mouth: Mucous membranes are moist    Eyes:      Extraocular Movements: Extraocular movements intact  Conjunctiva/sclera: Conjunctivae normal    Cardiovascular:      Rate and Rhythm: Normal rate and regular rhythm  Pulses: Normal pulses  Heart sounds: Normal heart sounds  No murmur heard  No friction rub  No gallop  Pulmonary:      Effort: Pulmonary effort is normal  No respiratory distress        Breath sounds: Normal breath sounds  No stridor  No wheezing, rhonchi or rales  Musculoskeletal:         General: Normal range of motion  Cervical back: Normal range of motion and neck supple  No rigidity  Lymphadenopathy:      Cervical: No cervical adenopathy  Skin:     Findings: No erythema or rash  Neurological:      General: No focal deficit present  Mental Status: She is alert and oriented to person, place, and time  Psychiatric:         Mood and Affect: Mood normal          Behavior: Behavior normal                 Return if symptoms worsen or fail to improve  Kings Hahn MD    Note: Portions of the record have been created with voice recognition software  Occasional wrong word or "sound a like" substitutions may have occurred due to the inherent limitations of voice recognition software  Read the chart carefully and recognize, using context, where substitutions have occurred

## 2022-06-20 ENCOUNTER — APPOINTMENT (OUTPATIENT)
Dept: LAB | Facility: CLINIC | Age: 42
End: 2022-06-20
Payer: COMMERCIAL

## 2022-06-20 DIAGNOSIS — Z11.59 NEED FOR HEPATITIS C SCREENING TEST: ICD-10-CM

## 2022-06-20 LAB — HCV AB SER QL: NORMAL

## 2022-06-20 PROCEDURE — 86803 HEPATITIS C AB TEST: CPT

## 2022-06-20 PROCEDURE — 36415 COLL VENOUS BLD VENIPUNCTURE: CPT

## 2022-07-20 ENCOUNTER — PATIENT MESSAGE (OUTPATIENT)
Dept: FAMILY MEDICINE CLINIC | Facility: CLINIC | Age: 42
End: 2022-07-20

## 2022-07-28 ENCOUNTER — HOSPITAL ENCOUNTER (OUTPATIENT)
Dept: RADIOLOGY | Age: 42
Discharge: HOME | End: 2022-07-28
Payer: COMMERCIAL

## 2022-07-28 VITALS — HEIGHT: 64 IN | BODY MASS INDEX: 30.73 KG/M2 | WEIGHT: 180 LBS

## 2022-07-28 DIAGNOSIS — Z12.31 ENCOUNTER FOR SCREENING MAMMOGRAM FOR BREAST CANCER: ICD-10-CM

## 2022-07-28 PROCEDURE — 77063 BREAST TOMOSYNTHESIS BI: CPT

## 2022-07-28 PROCEDURE — 77067 SCR MAMMO BI INCL CAD: CPT

## 2022-08-02 DIAGNOSIS — R92.2 DENSE BREAST TISSUE ON MAMMOGRAM: Primary | ICD-10-CM

## 2022-10-25 ENCOUNTER — TELEPHONE (OUTPATIENT)
Dept: GYNECOLOGY | Facility: CLINIC | Age: 42
End: 2022-10-25

## 2022-10-25 NOTE — TELEPHONE ENCOUNTER
Patient called requesting to replace her IUD sooner than February  It was inserted on 2/23/18  She has an appointment scheduled for removal and re-insertion on 2/24/23  She has not had a regular cycle for most of the time she has had the IUD inserted, only occasional brown spotting  Now c/o last week had brown spotting with pelvic cramping, relieved with Advil  She wants assurance that her present IUD is still effective as contraception

## 2022-10-26 NOTE — TELEPHONE ENCOUNTER
Likely, the bleeding is consistent with irregular bleeding that can be found with [facility] IUD  If patient is concerned, could return for ultrasound and office visit/exam with me to confirm IUD is in good position  We can then discuss management options after that

## 2022-10-27 NOTE — TELEPHONE ENCOUNTER
Patient did not answer return call  Left message with all info  Advised call back if she decides to schedule US and OV

## 2022-12-14 ENCOUNTER — TELEPHONE (OUTPATIENT)
Dept: GYNECOLOGY | Facility: CLINIC | Age: 42
End: 2022-12-14

## 2022-12-21 ENCOUNTER — TELEPHONE (OUTPATIENT)
Dept: GYNECOLOGY | Facility: CLINIC | Age: 42
End: 2022-12-21

## 2022-12-21 NOTE — TELEPHONE ENCOUNTER
Per Tahira Kelley at [facility] on 12/21/22 Prior Authorization is needed for [facility] removal and reinsertion CPT code [id number], [id number]  Clinicals will be faxed     Ref # [id number]      Per Gena Man at [facility] removal and reinsertion are covered at 100%    Ref # [id number]

## 2022-12-27 NOTE — TELEPHONE ENCOUNTER
Per fax on 12/27/22 at 1:01 pm Prior Authorization is approved for CPT codes ,92827,95892  Ref # [id number]    This authorization is good from 2/24/2023 till 3/24/2023

## 2023-01-05 ENCOUNTER — TELEPHONE (OUTPATIENT)
Dept: PSYCHIATRY | Facility: CLINIC | Age: 43
End: 2023-01-05

## 2023-01-05 NOTE — TELEPHONE ENCOUNTER
Pt called in regards to request services through [phone]  Writer informed pt that a message will be sent to proper person to call her back  Message was sent through Teams

## 2023-01-09 NOTE — TELEPHONE ENCOUNTER
Pt left message in regards to scheduling for therapy anywhere   Pt stated she will be available from 2-3 pm today and then after 4 pm

## 2023-01-11 NOTE — TELEPHONE ENCOUNTER
Pt called back and lvm in regards to therapy anywhere  Please reach out to her when you are able  [phone]

## 2023-01-11 NOTE — TELEPHONE ENCOUNTER
Behavioral Health Outpatient Intake Questions    Referred By   Self     Please advise interviewee that they need to answer all questions truthfully to allow for best care, and any misrepresentations of information may affect their ability to be seen at this clinic   => Was this discussed? Yes     If Minor Child (under age 25)    Who is/are the legal guardian(s) of the child? Is there a custody agreement? No     • If "YES"- Custody orders must be obtained prior to scheduling the first appointment  • In addition, Consent to Treatment must be signed by all legal guardians prior to scheduling the first appointment    • If "NO"- Consent to Treatment must be signed by all legal guardians prior to scheduling the first appointment    Behavioral Health Outpatient Intake History -     Presenting Problem (in patient's own words):     Pt stated that about a year ago her dad passed away and is still trying to process that  Pt stated that she has always felt that she has had anxiety but as recently gotten worse      Are there any communication barriers for this patient? No                                               If yes, please describe barriers: NONE   • If there is a unique situation, please refer to [address] [place] [address] for final determination  Are you taking any psychiatric medications? No   •   If "YES" -What are they NONE    •   If "YES" -Who prescribes? Has the Patient previously received outpatient Talk Therapy or Medication Management from [facility]     •    If "YES"- When, Where and with Whom? •    If "NO" -Has Patient received these services elsewhere? •   If "YES" -When, Where, and with Whom? Has the Patient abused alcohol or other substances in the last 6 months ? No  No concerns of substance abuse are reported  •  If "YES" -What substance, How much, How often? •  If illegal substance: Refer to [facility] (for MARIAH) or [facility]    •  If Alcohol in excess of 10 drinks per week:  Refer to [employer] (for MARIAH) or [facility] History-     Is this treatment court ordered? No   • If "Yes"- refer to [address] [place] [address] for final determination  Has the Patient been convicted of a felony? No  •  If "Yes" -When, What? • Talk Therapy : Send to [address] [place] [address] for final determination   • Med Management: Send to Dr Ofelia Duran for final determination     ACCEPTED as a patient Yes  • If "Yes" Appointment Date: 1/27/2023     Referred Elsewhere? No  • If “Yes” - (Where? Ex: [address], [facility], etc )       Name of Insurance Co:[employer] [address] [place] [address] EM#[id number]  Insurance Phone #  If ins is primary or secondary? Primary   If patient is a minor, parents information such as Name, D  O B of guarantor

## 2023-01-27 ENCOUNTER — TELEMEDICINE (OUTPATIENT)
Dept: PSYCHIATRY | Facility: CLINIC | Age: 43
End: 2023-01-27

## 2023-01-27 DIAGNOSIS — F43.22 ADJUSTMENT DISORDER WITH ANXIOUS MOOD: Primary | ICD-10-CM
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
## 2023-02-01 ENCOUNTER — TELEMEDICINE (OUTPATIENT)
Dept: PSYCHIATRY | Facility: CLINIC | Age: 43
End: 2023-02-01

## 2023-02-01 DIAGNOSIS — F43.20 ADJUSTMENT DISORDER, UNSPECIFIED TYPE: Primary | ICD-10-CM

## 2023-02-01 NOTE — BH TREATMENT PLAN
Outpatient Behavioral Health Psychotherapy Treatment Plan    Karine Dan  1980     Date of Initial Psychotherapy Assessment: 1/29/2023  Date of Current Treatment Plan: 02/01/23  Treatment Plan Target Date: 2/1/2024  Treatment Plan Expiration Date: 7/1/2023    Diagnosis:   1  Adjustment disorder, unspecified type            Area(s) of Need: I want to process through my dad passed away and how it's impacted family dynamics  I'd like to balance being there for my family and also be there for myself  Long Term Goal 1 (in the client's own words): I want to work through family stressors that increase my anxiety and lead me to actions or reactions that I don't want  Stage of Change: Contemplation    Target Date for completion: 2/1/2024     Anticipated therapeutic modalities: cognitive behavioral therapy, dialectical behavioral therapy, EMDR and supportive psychotherapy  People identified to complete this goal: Della Finnegan      Objective 1: (identify the means of measuring success in meeting the objective): Therapist will teach Karine CBT Client will engage in cognitive behavioral therapy including identifying emotions, learning about ANTs, identifying situations, Identifying thoughts, learning about cognitive distortions and challenging cognitive distortions  Client will report success and challenges in using CBT methods  Therapist will adjust therapy interventions as needed  Karine will utilize cognitive behavioral therapy in 8 out of 10 situations to challenge overly negative or distorted thinking  Objective 2: (identify the means of measuring success in meeting the objective):  Therapist will teach Karien DBT including Client will learn DBT concepts and skills including wise mind, core mindfulness, lovingkindness, middle path",TONY, GIVE FAST, building/ending relationships, behavior chain analysis, opposite action, understanding and naming emotions, Pros and cons, PLEASE skills, TIP skills, Radical acceptance, ACCEPTS, Turning the Mind and IMPROVE the moment  Client will practice skills and assess effectiveness with therapist biweekly and adjust skills acquisition as needed  Client will demonstrate successful use of DBT skills in at least 8 out of 10 challenging situations  I am currently under the care of a [facility] psychiatric provider: yes    My [facility] psychiatric provider is: Eduin Ken    I am currently taking psychiatric medications: No    I feel that I will be ready for discharge from mental health care when I reach the following (measurable goal/objective): When I can spend more time facing and dealing with what I'm feeling instead of pushing it off, especially in processing the death of my father  For children and adults who have a legal guardian:   Has there been any change to custody orders and/or guardianship status? NA  If yes, attach updated documentation  I have created my Crisis Plan and have been offered a copy of this plan    [address]: Diagnosis and Treatment Plan explained to [address] [place] [address] acknowledges an understanding of their diagnosis  Karine Dan agrees to this treatment plan  I have been offered a copy of this Treatment Plan  yes    Karine Dan, 1980, actively participated in the creation of this treatment plan during a virtual session, using the [employer] platform  Karine Dan  provided verbal consent on 2/1/2023 at 6:50pm PM  The treatment plan was transcribed into the [facility] at a later time

## 2023-02-01 NOTE — PSYCH
Behavioral Health Psychotherapy Progress Note    Psychotherapy Provided: Individual Psychotherapy     1  Adjustment disorder, unspecified type            Goals addressed in session: Goal 1     DATA: Karine shared that she was feeling good and got done at the gym with a workout  Goes to [address]  She usually goes to a class as well as strength training  Started to try cycling  Therapist led Karine through a meditation of a snow globe and letting her thoughts settle  She shared that her weekend was good and she enjoyed her time  She shared she is reading [employer] of [employer], a fantasy magic series  She shares she usually reads RomComs in her spare time  Therapist and Karine created a treatment plan this session  She shared she will see her mom next week for her birthday and she shared she has to choose carefully because her mom might not want to go to certain restaurants  She thinks the dinner will be good because her brother who is struggling will not be there  She said when he is around he is "gruff" and lacks emotional intelligence  She has worked hard on establishing boundaries with her mom  She shared her dad was enabling with her brother by rescuing him from tough situations  Her mom would also rescue him  Since her dad  they hear about Peterson's chaos and he is very anxious and verbally aggressive for "no reason" and she keeps a distance from him  He has been couch surfing for 3 years and he is relying on her mom a lot as well as her aunt  Her mom and aunt call her constantly and ask for her advice  She shared that she used to take care of her mother as a child  Her mom shared that she has memories of taking care of her mom emotionally as a 2year old  She shared she feels she takes care of others and nobody takes care of her   She was very upset over her dad's death and had his urn and told her mom she felt she was a shitty daughter and wasn't there for her because he was angry and nasty and hard to be around and her mom was reassuring to her in that moment  She shared that her dad lived with her family in 2018 for 6 months and then got veterans housing  He was alone and lonely living alone  During this session, this clinician used the following therapeutic modalities: Mindfulness-based Strategies and Supportive Psychotherapy    Substance Abuse was not addressed during this session  If the client is diagnosed with a co-occurring substance use disorder, please indicate any changes in the frequency or amount of use:   Stage of change for addressing substance use diagnoses: No substance use/Not applicable    ASSESSMENT:  Karine Dan presents with a Euthymic/ normal mood  her affect is Normal range and intensity, which is congruent, with her mood and the content of the session  The client has made progress on their goals  Karine Dan presents with a none risk of suicide, none risk of self-harm, and none risk of harm to others  For any risk assessment that surpasses a "low" rating, a safety plan must be developed  A safety plan was indicated: no  If yes, describe in detail     PLAN: Between sessions, Karine Dan will use mindfulness as needed  At the next session, the therapist will use Mindfulness-based Strategies and Supportive Psychotherapy to address anxiety and processing loss of her father  Behavioral Health Treatment Plan and Discharge Planning: Kevin Ridley is aware of and agrees to continue to work on their treatment plan  They have identified and are working toward their discharge goals   yes    Visit start and stop times:    02/01/23       Virtual Regular Visit    Verification of patient location:    Patient is located in the following state in which I hold an active license [place]      Assessment/Plan:    Problem List Items Addressed This Visit        Other    Adjustment disorder - Primary       Goals addressed in session: Goal 1 Reason for visit is   Chief Complaint   Patient presents with   • Virtual Regular Visit        Encounter provider Damion Centeno LCSW    Provider located at [address] [place] [address] [address] [id number] [phone]      Recent Visits  Date Type Provider Dept   01/27/23 Telemedicine Damion Centeno LCSW [facility]   Showing recent visits within past 7 days and meeting all other requirements  Future Appointments  No visits were found meeting these conditions  Showing future appointments within next 150 days and meeting all other requirements       The patient was identified by name and date of birth  Karine Dan was informed that this is a telemedicine visit and that the visit is being conducted throughthe [employer] platform  She agrees to proceed     My office door was closed  No one else was in the room  She acknowledged consent and understanding of privacy and security of the video platform  The patient has agreed to participate and understands they can discontinue the visit at any time  Patient is aware this is a billable service  Subjective  Karine Hobson is a 36 y o  female    HPI     Past Medical History:   Diagnosis Date   • Abnormal Pap smear of cervix    • Allergic rhinitis     last assessed-12/31/2012   • Anemia    • Chronic pain syndrome 6/11/2020   • SANDEEP III (cervical intraepithelial neoplasia grade III) with severe dysplasia     LEEP cone biopsy for SANDEEP-3 2/25/04    Done at the hospital   • Gestational diabetes     antepartum condition or prior complicated delivery   • Impacted cerumen of both ears     last assessed-6/13/2012   • Low back pain    • Oligomenorrhea    • Spinal stenosis    • Spinal stenosis of lumbar region without neurogenic claudication 6/14/2019   • Vaginal candidiasis     last assessed-11/20/2013   • Varicella     chickenpox as a child   • Vertigo last assessed-2013       Past Surgical History:   Procedure Laterality Date   • CERVICAL BIOPSY  W/ LOOP ELECTRODE EXCISION  2004    Pathololgy with SANDEEP-2 to 3, with free margins but dysplasia extended to within 1mm of endocervical margin  2004   •  SECTION, LOW TRANSVERSE  2009    Primary low transverse  for arrest in the active phase at 4 cm with unengaged vertex despite hours of labor   • COLPOSCOPY W/ BIOPSY / CURETTAGE  2003   • EPIDURAL BLOCK INJECTION N/A 2020    Procedure: L4-L5 LUMBAR EPIDURAL STEROID INJECTION (13347); Surgeon: Gregory Lynhc MD;  Location: [facility] MAIN OR;  Service: Pain Management    • FOOT SURGERY Right    • TOOTH EXTRACTION         Current Outpatient Medications   Medication Sig Dispense Refill   • benzonatate (TESSALON PERLES) 100 mg capsule Take 1 capsule (100 mg total) by mouth 3 (three) times a day as needed for cough 30 capsule 0   • carbamide peroxide (Debrox) 6 5 % otic solution      • fluticasone (FLONASE) 50 mcg/act nasal spray 1 spray into each nostril daily     • levonorgestrel (KYLEENA) 19 5 MG intrauterine device 1 Intra Uterine Device by Intrauterine route once     • loratadine (Alavert) 10 MG dissolvable tablet      • LORazepam (Ativan) 0 5 mg tablet Take 1 tablet (0 5 mg total) by mouth every 8 (eight) hours as needed for anxiety 5 tablet 0   • triamcinolone (KENALOG) 0 1 % cream Apply topically 2 (two) times a day 30 g 0     No current facility-administered medications for this visit  No Known Allergies    Review of Systems    Video Exam    There were no vitals filed for this visit      Physical Exam     Visit Time    Visit Start Time: 6:00pm  Visit Stop Time: 6:52pm  Total Visit Duration: 52 minutes

## 2023-02-15 ENCOUNTER — TELEMEDICINE (OUTPATIENT)
Dept: PSYCHIATRY | Facility: CLINIC | Age: 43
End: 2023-02-15

## 2023-02-15 DIAGNOSIS — F43.20 ADJUSTMENT DISORDER, UNSPECIFIED TYPE: Primary | ICD-10-CM

## 2023-02-15 NOTE — PSYCH
Behavioral Health Psychotherapy Progress Note    Psychotherapy Provided: Individual Psychotherapy     1  Adjustment disorder, unspecified type            Goals addressed in session: Goal 1     DATA: Karine shared that her birthday weekend was good and she had dinner and a massage for her birthday  She shared that she saw her mom on the weekend at the [facility] and if her mom's order is wrong she gets very upset and they got Karine's food wrong  She talked about her mom and grandmother being emotionally reactive, with her mom being a "dimmer version" of her grandmother  She shared her grandmother's mom passed when she was young, her dad was an alcoholic and she ended up in foster care  Grandmother went into [employer] as an x ray technician and met her grandfather  Grandmother and grandfather got along but grandmother was domineering  They fought a lot and got  when her mom was a teen  She did not remarry and didn't bring guys around to the house  Her mom never remarried and was not assaulted  Her mom and her aunt were verbally abused by their mom, and her youngest aunt was not abused but has trouble with drugs  Therapist asked if Lamount Leyden feels she was raised in an invalidating environment the way her mom was and she said she was not, that she feel unconditionally loved  She was emotionally labile and unpredictable however  She was social and wanted to be out of the house because of her mom's emotional unpredictability  She shares she thinks she is insightful but guarded with friends and doesn't open up about what she is going through because she feels selfish when she does  She shares she does open up at times and is surprised when people care  She shared that she was born because she was pregnant and didn't plan it and her brother was a "pill baby "  She felt she couldn't say anything favorable about either parent but had to choose a side, dad or mom   Therapist asked her about her meditation practice and she said she does it when she feels unfocused by doing breaths and exploring how she is feeling when she is hungry, doing a body scan to see how she is feeling and recognizes she is feeling tired and not hungry  She shares she wants to be more aware of her thinking and where this is coming from  She says she feels like being tired is a vulnerability factor for her in terms of feeling hungry or anxious  During this session, this clinician used the following therapeutic modalities: Cognitive Behavioral Therapy, Mindfulness-based Strategies and Supportive Psychotherapy    Substance Abuse was not addressed during this session  If the client is diagnosed with a co-occurring substance use disorder, please indicate any changes in the frequency or amount of use:   Stage of change for addressing substance use diagnoses: No substance use/Not applicable    ASSESSMENT:  Karine Dan presents with a Euthymic/ normal mood  her affect is Normal range and intensity, which is congruent, with her mood and the content of the session  The client has made progress on their goals  Karine Dan presents with a none risk of suicide, none risk of self-harm, and none risk of harm to others  For any risk assessment that surpasses a "low" rating, a safety plan must be developed  A safety plan was indicated: no  If yes, describe in detail     PLAN: Between sessions, Karine Dan will be mindful to thoughts and feelings  At the next session, the therapist will use Cognitive Behavioral Therapy, Dialectical Behavior Therapy, Mindfulness-based Strategies and Supportive Psychotherapy to address process past trauma and increase mindfulness  Behavioral Health Treatment Plan and Discharge Planning: Jose Kiki is aware of and agrees to continue to work on their treatment plan  They have identified and are working toward their discharge goals   yes    Visit start and stop times:    02/15/23       Virtual Regular Visit    Verification of patient location:    Patient is located in the following state in which I hold an active license [place]      Assessment/Plan:    Problem List Items Addressed This Visit        Other    Adjustment disorder - Primary       Goals addressed in session: Goal 1          Reason for visit is   Chief Complaint   Patient presents with   • Virtual Regular Visit        Encounter provider Sanjuanita Mccloud LCSW    Provider located at [address] [place] [id number] [phone]      Recent Visits  No visits were found meeting these conditions  Showing recent visits within past 7 days and meeting all other requirements  Future Appointments  No visits were found meeting these conditions  Showing future appointments within next 150 days and meeting all other requirements       The patient was identified by name and date of birth  Karine Dan was informed that this is a telemedicine visit and that the visit is being conducted throughthe [employer] platform  She agrees to proceed     My office door was closed  No one else was in the room  She acknowledged consent and understanding of privacy and security of the video platform  The patient has agreed to participate and understands they can discontinue the visit at any time  Patient is aware this is a billable service  Subjective  Karine Clemens is a 39 y o  female Ashee was able to openly communicate thoughts and feelings and appeared calm and cooperative   HPI     Past Medical History:   Diagnosis Date   • Abnormal Pap smear of cervix    • Allergic rhinitis     last assessed-12/31/2012   • Anemia    • Chronic pain syndrome 6/11/2020   • SANDEEP III (cervical intraepithelial neoplasia grade III) with severe dysplasia     LEEP cone biopsy for SANDEEP-3 2/25/04    Done at the hospital   • Gestational diabetes     antepartum condition or prior complicated delivery   • Impacted cerumen of both ears     last assessed-2012   • Low back pain    • Oligomenorrhea    • Spinal stenosis    • Spinal stenosis of lumbar region without neurogenic claudication 2019   • Vaginal candidiasis     last assessed-2013   • Varicella     chickenpox as a child   • Vertigo     last assessed-2013       Past Surgical History:   Procedure Laterality Date   • CERVICAL BIOPSY  W/ LOOP ELECTRODE EXCISION  2004    Pathololgy with SANDEEP-2 to 3, with free margins but dysplasia extended to within 1mm of endocervical margin  2004   •  SECTION, LOW TRANSVERSE  2009    Primary low transverse  for arrest in the active phase at 4 cm with unengaged vertex despite hours of labor   • COLPOSCOPY W/ BIOPSY / CURETTAGE  2003   • EPIDURAL BLOCK INJECTION N/A 2020    Procedure: L4-L5 LUMBAR EPIDURAL STEROID INJECTION (33714); Surgeon: Omer Flower MD;  [facility] MA[facility];  Service: Pain Management    • FOOT SURGERY Right    • TOOTH EXTRACTION         Current Outpatient Medications   Medication Sig Dispense Refill   • benzonatate (TESSALON PERLES) 100 mg capsule Take 1 capsule (100 mg total) by mouth 3 (three) times a day as needed for cough 30 capsule 0   • carbamide peroxide (Debrox) 6 5 % otic solution      • fluticasone (FLONASE) 50 mcg/act nasal spray 1 spray into each nostril daily     • levonorgestrel (KYLEENA) 19 5 MG intrauterine device 1 Intra Uterine Device by Intrauterine route once     • loratadine (Alavert) 10 MG dissolvable tablet      • LORazepam (Ativan) 0 5 mg tablet Take 1 tablet (0 5 mg total) by mouth every 8 (eight) hours as needed for anxiety 5 tablet 0   • triamcinolone (KENALOG) 0 1 % cream Apply topically 2 (two) times a day 30 g 0     No current facility-administered medications for this visit  No Known Allergies    Review of Systems    Video Exam    There were no vitals filed for this visit      Physical Exam     Visit Time    Visit Start Time: 6:00pm  Visit Stop Time: 6:52pm  Total Visit Duration: 52 minutes

## 2023-02-22 ENCOUNTER — TELEMEDICINE (OUTPATIENT)
Dept: PSYCHIATRY | Facility: CLINIC | Age: 43
End: 2023-02-22

## 2023-02-22 DIAGNOSIS — F43.20 ADJUSTMENT DISORDER, UNSPECIFIED TYPE: Primary | ICD-10-CM

## 2023-02-22 NOTE — PSYCH
Behavioral Health Psychotherapy Progress Note    Psychotherapy Provided: Individual Psychotherapy     1  Adjustment disorder, unspecified type            Goals addressed in session: Goal 1     DATA: Karine shared that she had worked out and was feeling good  She shared some details about her adarsh of being Still which is accepting and she grew up going to [facility] in [place]  Karine shared that she lives on same property as her mother and father in law and they are supportive but have good boundaries  MIL retired and spends a lot of time with Dada Ruperto and with mother as well  She shared she is feeling the loss of her dad and didn't feel it the first year because there were times when she didn't see her  She always felt he was there but not too involved  She feels frustrated that her dad didn't ask her for things because he felt she had a life and was busy so would ask her brother Sirisha Rowell who would not be reliable and would leave for hours  He would fall and wouldn't tell her about it as well as other things that would happen  He had CHF as well as kidney disease  He stopped going to dialysis and said he wanted to go on hospice because he couldn't be cured  She said she feels he may have kept living for her and Sirisha Rowell  She shared that before he got sick he was funny, social and strong and was so unhappy when he was sick  She shared about her parents fighting a lot when she was a child and being put into the adult and that this caused her to feel overwhelmed and still feels overwhelmed by being an adult  Therapist gave feedback that sometimes Karine may sometimes become overwhelmed because of the way that she grew up, and that she gets into the child state like the way she was as a child  She shared that this made sense  Therapist offered a meditation to end the session for grounding and Karine shared that she was feeling good and already grounded and did not need this    During this session, this clinician used the following therapeutic modalities: Supportive Psychotherapy    Substance Abuse was not addressed during this session  If the client is diagnosed with a co-occurring substance use disorder, please indicate any changes in the frequency or amount of use:   Stage of change for addressing substance use diagnoses: No substance use/Not applicable    ASSESSMENT:  Karine Dan presents with a Euthymic/ normal mood  her affect is Normal range and intensity, which is congruent, with her mood and the content of the session  The client has not made progress on their goals  Karine Dan presents with a none risk of suicide, none risk of self-harm, and none risk of harm to others  For any risk assessment that surpasses a "low" rating, a safety plan must be developed  A safety plan was indicated: no  If yes, describe in detail     PLAN: Between sessions, Karine Dan will practice meditation daily for grounding  At the next session, the therapist will use Mindfulness-based Strategies and Supportive Psychotherapy to address grief, past trauma, remaining grounded  Behavioral Health Treatment Plan and Discharge Planning: Xiao Mahmood is aware of and agrees to continue to work on their treatment plan  They have identified and are working toward their discharge goals   yes    Visit start and stop times:    02/22/23

## 2023-02-24 ENCOUNTER — PROCEDURE VISIT (OUTPATIENT)
Dept: GYNECOLOGY | Facility: CLINIC | Age: 43
End: 2023-02-24

## 2023-02-24 VITALS
WEIGHT: 182.2 LBS | BODY MASS INDEX: 31.1 KG/M2 | DIASTOLIC BLOOD PRESSURE: 78 MMHG | HEIGHT: 64 IN | SYSTOLIC BLOOD PRESSURE: 122 MMHG

## 2023-02-24 DIAGNOSIS — Z30.433 ENCOUNTER FOR REMOVAL AND REINSERTION OF INTRAUTERINE CONTRACEPTIVE DEVICE (IUD): Primary | ICD-10-CM

## 2023-02-24 DIAGNOSIS — N91.4 SECONDARY OLIGOMENORRHEA: ICD-10-CM

## 2023-02-24 DIAGNOSIS — Z01.818 PREOPERATIVE TESTING: ICD-10-CM

## 2023-02-24 LAB — SL AMB POCT URINE HCG: NEGATIVE

## 2023-02-24 NOTE — PROGRESS NOTES
Assessment/Plan   Diagnoses and all orders for this visit:    Encounter for removal and reinsertion of intrauterine contraceptive device (IUD)  -     levonorgestrel (KYLEENA) 19 5 mg intrauterine device (IUD)  -     Iud insertions    Preoperative testing  -     POCT urine HCG    Secondary oligomenorrhea    Other orders  -     Iud removal    1  Yoandy Masterson IUD removal with reinsertion-Done without problems  Patient tolerated procedure well  This is her third IUD  Pelvic rest recommended for the next 5 to 7 days  IUD string cut to a length of 3 cm  Follow-up 1 month for IUD check  2  secondary oligomenorrhea-notes rare bleeding on the IUD  With a new IUD, might have some increased bleeding temporarily and then likely go back to her usual baseline  Call or return with any issues  3   History of severe dysplasia-status post LEEP cone biopsy 2004 with negative testing since then  Pap with HPV was negative from 4/12/2021  4  dense breast tissue- counseled previously about limited visualization and increased risk at [facility] breast screening algorithm  5  other-  with mole with HPV from his penis  Her Pap with HPV testing was negative as noted above  Follow-up 1 month for IUD check or as needed  Subjective   Patient ID: Ximena Lambert is a 39 y o  female  Vitals:    02/24/23 1010   BP: 122/78     HPI    The following portions of the patient's history were reviewed and updated as appropriate: allergies, current medications, past family history, past medical history, past social history, past surgical history and problem list   Past Medical History:   Diagnosis Date   • Abnormal Pap smear of cervix    • Allergic rhinitis     last assessed-12/31/2012   • Anemia    • Chronic pain syndrome 6/11/2020   • SANDEEP III (cervical intraepithelial neoplasia grade III) with severe dysplasia     LEEP cone biopsy for SANDEEP-3 2/25/04    Done at the hospital   • Gestational diabetes     antepartum condition or prior complicated delivery   • Impacted cerumen of both ears     last assessed-2012   • Low back pain    • Oligomenorrhea    • Spinal stenosis    • Spinal stenosis of lumbar region without neurogenic claudication 2019   • Vaginal candidiasis     last assessed-2013   • Varicella     chickenpox as a child   • Vertigo     last assessed-2013     Past Surgical History:   Procedure Laterality Date   • CERVICAL BIOPSY  W/ LOOP ELECTRODE EXCISION  2004    Pathololgy with SANDEEP-2 to 3, with free margins but dysplasia extended to within 1mm of endocervical margin  2004   •  SECTION, LOW TRANSVERSE  2009    Primary low transverse  for arrest in the active phase at 4 cm with unengaged vertex despite hours of labor   • COLPOSCOPY W/ BIOPSY / CURETTAGE  2003   • EPIDURAL BLOCK INJECTION N/A 2020    Procedure: L4-L5 LUMBAR EPIDURAL STEROID INJECTION (72561);   Surgeon: Mayra Gaitan MD;  Location: [facility] MAIN OR;  Service: Pain Management    • FOOT SURGERY Right    • TOOTH EXTRACTION       OB History    Para Term  AB Living   1 1 1     1   SAB IAB Ectopic Multiple Live Births           1      # Outcome Date GA Lbr Gregory/2nd Weight Sex Delivery Anes PTL Lv   1 Term               Obstetric Comments    1       Current Outpatient Medications:   •  carbamide peroxide (Debrox) 6 5 % otic solution, , Disp: , Rfl:   •  fluticasone (FLONASE) 50 mcg/act nasal spray, 1 spray into each nostril daily, Disp: , Rfl:   •  loratadine (Alavert) 10 MG dissolvable tablet, , Disp: , Rfl:   •  triamcinolone (KENALOG) 0 1 % cream, Apply topically 2 (two) times a day, Disp: 30 g, Rfl: 0  •  benzonatate (TESSALON PERLES) 100 mg capsule, Take 1 capsule (100 mg total) by mouth 3 (three) times a day as needed for cough, Disp: 30 capsule, Rfl: 0  •  LORazepam (Ativan) 0 5 mg tablet, Take 1 tablet (0 5 mg total) by mouth every 8 (eight) hours as needed for anxiety, Disp: 5 tablet, Rfl: 0    Current Facility-Administered Medications:   •  levonorgestrel (KYLEENA) 19 5 mg intrauterine device (IUD), 1 each, Intrauterine, Once, Renetta Fish MD  No Known Allergies  Social History     Socioeconomic History   • Marital status: /Civil Union     Spouse name: None   • Number of children: 1   • Years of education: None   • Highest education level: None   Occupational History   • None   Tobacco Use   • Smoking status: Never   • Smokeless tobacco: Never   Vaping Use   • Vaping Use: Never used   Substance and Sexual Activity   • Alcohol use: Yes     Alcohol/week: 2 0 standard drinks     Types: 2 Glasses of wine per week   • Drug use: No   • Sexual activity: Yes     Partners: Male     Birth control/protection: I U D     Other Topics Concern   • None   Social History Narrative    Caffeine use    Alevism Affiliation [employer] ([address] [place] [address])    Uses safety equipment-seatbelts     Social Determinants of Health     Financial Resource Strain: Not on file   Food Insecurity: Not on file   Transportation Needs: Not on file   Physical Activity: Not on file   Stress: Not on file   Social Connections: Not on file   Intimate Partner Violence: Not on file   Housing Stability: Not on file     Family History   Problem Relation Age of Onset   • Skin cancer Mother    • Hypertension Father    • Kidney failure Father    • Hypertension Maternal Grandmother    • Lung cancer Maternal Grandmother    • Cancer Maternal Grandfather         bladder   • Hypertension Maternal Grandfather    • Lung cancer Paternal Grandmother    • Heart disease Paternal Grandfather    • No Known Problems Brother    • No Known Problems Son    • No Known Problems Maternal Aunt    • No Known Problems Maternal Aunt    • No Known Problems Maternal Uncle    • No Known Problems Paternal Aunt    • No Known Problems Paternal Uncle        Review of Systems    Objective   Physical Exam  OBGyn Exam     Objective      /78 (BP Location: Left arm, Patient Position: Sitting)   Ht 5' 4" (1 626 m)   Wt 82 6 kg (182 lb 3 2 oz)   BMI 31 27 kg/m²     General:   alert and oriented, in no acute distress   Neck:    Breast:    Heart:    Lungs:    Abdomen: soft, non-tender, without masses or organomegaly   Vulva: normal   Vagina: Without erythema or lesions or discharge  Normal   Cervix: Without lesions or discharge or cervicitis    No Cervical motion tenderness   Uterus: top normal size, anteverted, non-tender   Adnexa: no mass, fullness, tenderness   Rectum: deferred    Psych:  Normal mood and affect   Skin:  Without obvious lesions   Eyes: symmetric, with normal movements and reactivity   Musculoskeletal:  Normal muscle tone and movements appreciated

## 2023-02-24 NOTE — PROGRESS NOTES
Iud removal    Date/Time: 2023 10:18 AM  Performed by: Cecy Domínguez MD  Authorized by: Cecy Domínguez MD   Universal Protocol:  Consent: Verbal consent obtained  Written consent obtained  Risks and benefits: risks, benefits and alternatives were discussed  Consent given by: patient  Time out: Immediately prior to procedure a "time out" was called to verify the correct patient, procedure, equipment, support staff and site/side marked as required  Timeout called at: 2023 10:18 AM   Patient understanding: patient states understanding of the procedure being performed  Patient consent: the patient's understanding of the procedure matches consent given  Procedure consent: procedure consent matches procedure scheduled  Relevant documents: relevant documents present and verified  Test results: test results available and properly labeled  Patient identity confirmed: verbally with patient      Procedure:     Removed with no complications: yes      Other reason for removal:   IUD  Comments:      IUD string was grasped, IUD removed without difficulty  Patient tolerated without problems

## 2023-02-24 NOTE — PROGRESS NOTES
Iud insertions    Date/Time: 2/24/2023 10:25 AM  Performed by: Cadence Adames MD  Authorized by: Cadence Adames MD     Other Assisting Provider: No    Verbal consent obtained?: Yes    Written consent obtained?: Yes    Risks and benefits: Risks, benefits and alternatives were discussed    Consent given by:  Patient  Time Out:     Time out: Immediately prior to the procedure a time out was called      Time out performed at:  2/24/2023 10:25 AM  Patient states understanding of procedure being performed: Yes    Patient's understanding of procedure matches consent: Yes    Procedure consent matches procedure scheduled: Yes    Relevant documents present and verified: Yes    Test results available and properly labeled: Yes    Patient identity confirmed:  Verbally with patient  Procedure:     Pelvic exam performed: yes      Negative urine pregnancy test: yes      Cervix cleaned and prepped: yes      Speculum placed in vagina: yes      Tenaculum applied to cervix: yes      Uterus sounded: yes      Uterus sound depth (cm):  8    IUD inserted with no complications: yes      IUD type:  Nuria Rubins    Strings trimmed: yes    Post-procedure:     Patient tolerated procedure well: yes      Patient will follow up after next period: yes    Comments:      Betadine placed on the cervix  Anterior lip of the cervix grasped with tenaculum  Uterus sounded to 8 cm  Nuria Rubins IUD placed without difficulty  IUD string cut to a length of 3 cm  Tolerated well by the patient  Pelvic rest recommended for the next 7 days  Follow-up 1 month time for IUD check

## 2023-03-01 ENCOUNTER — TELEMEDICINE (OUTPATIENT)
Dept: PSYCHIATRY | Facility: CLINIC | Age: 43
End: 2023-03-01

## 2023-03-01 DIAGNOSIS — F43.20 ADJUSTMENT DISORDER, UNSPECIFIED TYPE: Primary | ICD-10-CM

## 2023-03-01 NOTE — PSYCH
Behavioral Health Psychotherapy Progress Note    Psychotherapy Provided: Individual Psychotherapy     1  Adjustment disorder, unspecified type            Goals addressed in session: Goal 1     DATA: Karine shared she was feeling frustrated and therapist led Gurutressa Calderón through energy ball meditation to shrink and fade a ball of negative energy  She shared her ball shrunk to a marble  She shared she went to sara yoga class and shared it was intense and challenging  She is wanting to go back to do it again even though she felt nauseas  She shared she has been feeling okay and has been dealing with her mom who is retiring and is upset over this change and also with her brother who is going through some difficulties  She has been setting boundaries with listening about her brother  She shares she feels bad when talking about her brother with some "residue" feeling from talking to him  She shared her brother is trying to put in a [place] [employer] claim for a lawsuit  He sends Karine updates of what is going on and is interested in getting money from this lawsuit  He will send long texts to Aubrey Calderón about their past at 2am about their pasts and he is stuck in being a "victim" with talking about what was done to him and that someone needs to apologize  She described how Deanna Neel talks to her in terms of saying underhanded compliments  She also talked about her brother being entrenched in victim mentality  She shared she tried reading about codependency and was triggered by it because it scared her because of seeing herself in the dynamics as well as her brother  During this session, this clinician used the following therapeutic modalities: Mindfulness-based Strategies and Supportive Psychotherapy    Substance Abuse was not addressed during this session  If the client is diagnosed with a co-occurring substance use disorder, please indicate any changes in the frequency or amount of use:    Stage of change for addressing substance use diagnoses: No substance use/Not applicable    ASSESSMENT:  Karine Dan presents with a Euthymic/ normal mood  her affect is Normal range and intensity, which is congruent, with her mood and the content of the session  The client has made progress on their goals  Karine Dan presents with a none risk of suicide, none risk of self-harm, and none risk of harm to others  For any risk assessment that surpasses a "low" rating, a safety plan must be developed  A safety plan was indicated: no  If yes, describe in detail     PLAN: Between sessions, Caridad Calixto will think about her role in the family dynamics  At the next session, the therapist will use Supportive Psychotherapy and ptsd assessment to address effects of past trauma  Behavioral Health Treatment Plan and Discharge Planning: Caridad Calixto is aware of and agrees to continue to work on their treatment plan  They have identified and are working toward their discharge goals  yes    Visit start and stop times:    03/01/23       Virtual Regular Visit    Verification of patient location:    Patient is located in the following state in which I hold an active license [place]      Assessment/Plan:    Problem List Items Addressed This Visit        Other    Adjustment disorder - Primary       Goals addressed in session: Goal 1          Reason for visit is   Chief Complaint   Patient presents with   • Virtual Regular Visit        Encounter provider Jeanette Forrester LCSW    Provider located at [address] [place] [id number] [phone]      Recent Visits  Date Type Provider Dept   02/22/23 Telemedicine Jeanette Forrester LCSW [facility]   Showing recent visits within past 7 days and meeting all other requirements  Future Appointments  No visits were found meeting these conditions    Showing future appointments within next 150 days and meeting all other requirements       The patient was identified by name and date of birth  Karine Dan was informed that this is a telemedicine visit and that the visit is being conducted throughthe [employer] platform  She agrees to proceed     My office door was closed  No one else was in the room  She acknowledged consent and understanding of privacy and security of the video platform  The patient has agreed to participate and understands they can discontinue the visit at any time  Patient is aware this is a billable service  Subjective  Karine Lees is a 39 y o  female Karine appeared calm and cooperative and was able to engage in psychotherapy to explore ways her family dynamics and past events have shaped her  Karine was able to agree to do a trauma screening at the next session  HPI     Past Medical History:   Diagnosis Date   • Abnormal Pap smear of cervix    • Allergic rhinitis     last assessed-2012   • Anemia    • Chronic pain syndrome 2020   • SANDEEP III (cervical intraepithelial neoplasia grade III) with severe dysplasia     LEEP cone biopsy for SANDEEP-3 04  Done at the hospital   • Gestational diabetes     antepartum condition or prior complicated delivery   • Impacted cerumen of both ears     last assessed-2012   • Low back pain    • Oligomenorrhea    • Spinal stenosis    • Spinal stenosis of lumbar region without neurogenic claudication 2019   • Vaginal candidiasis     last assessed-2013   • Varicella     chickenpox as a child   • Vertigo     last assessed-2013       Past Surgical History:   Procedure Laterality Date   • CERVICAL BIOPSY  W/ LOOP ELECTRODE EXCISION  2004    Pathololgy with SANDEEP-2 to 3, with free margins but dysplasia extended to within 1mm of endocervical margin    2004   •  SECTION, LOW TRANSVERSE  2009    Primary low transverse  for arrest in the active phase at 4 cm with unengaged vertex despite hours of labor   • COLPOSCOPY W/ BIOPSY / CURETTAGE  12/19/2003   • EPIDURAL BLOCK INJECTION N/A 7/9/2020    Procedure: L4-L5 LUMBAR EPIDURAL STEROID INJECTION (58512); Surgeon: Carlos Manuel Lin MD;  Location: [facility] MAIN OR;  Service: Pain Management    • FOOT SURGERY Right    • TOOTH EXTRACTION         Current Outpatient Medications   Medication Sig Dispense Refill   • benzonatate (TESSALON PERLES) 100 mg capsule Take 1 capsule (100 mg total) by mouth 3 (three) times a day as needed for cough 30 capsule 0   • carbamide peroxide (Debrox) 6 5 % otic solution      • fluticasone (FLONASE) 50 mcg/act nasal spray 1 spray into each nostril daily     • loratadine (Alavert) 10 MG dissolvable tablet      • LORazepam (Ativan) 0 5 mg tablet Take 1 tablet (0 5 mg total) by mouth every 8 (eight) hours as needed for anxiety 5 tablet 0   • triamcinolone (KENALOG) 0 1 % cream Apply topically 2 (two) times a day 30 g 0     No current facility-administered medications for this visit  No Known Allergies    Review of Systems    Video Exam    There were no vitals filed for this visit      Physical Exam     Visit Time    Visit Start Time: 6:00pm  Visit Stop Time: 6:52pm  Total Visit Duration: 52 minutes

## 2023-03-08 ENCOUNTER — TELEMEDICINE (OUTPATIENT)
Dept: PSYCHIATRY | Facility: CLINIC | Age: 43
End: 2023-03-08

## 2023-03-08 DIAGNOSIS — F43.22 ADJUSTMENT DISORDER WITH ANXIOUS MOOD: Primary | ICD-10-CM

## 2023-03-08 NOTE — PSYCH
Behavioral Health Psychotherapy Progress Note    Psychotherapy Provided: Individual Psychotherapy     1  Adjustment disorder with anxious mood            Goals addressed in session: Goal 1     DATA: Karine shared that she was feeling good and scheduled a hot yoga flow for next week and a yoga class  Things have been quiet with her mom and her brother this week  She shared she has been thinking about her dad and how abrupt it was sharing that her dad decided to go off dialysis on a Friday and he passed on Tuesday  She shared she has been thinking about his last days with his struggles with breathing  Ally Meyers wasn't keeping his shit together which was frustrating for her  She shared she had Dyke Wesley go see him and she did too and he kept calling her beautiful and her son jarek Meyers was yelling at his dad after he  and couldn't keep himself together and made it about him  She processed feelings of regret over there not being more time with her dad  She shared if she had had more time she would have asked him questions and spent time with him  She shared she felt bad for him because he seemed to think he didn't matter and nobody loved him  Therapist ended session with a good memory of her dad and she shared a memory of her dad at her wedding when he was lively and happy and getting along with his siblings  During this session, this clinician used the following therapeutic modalities: Supportive Psychotherapy    Substance Abuse was not addressed during this session  If the client is diagnosed with a co-occurring substance use disorder, please indicate any changes in the frequency or amount of use:   Stage of change for addressing substance use diagnoses: No substance use/Not applicable    ASSESSMENT:  Karine Dan presents with a Euthymic/ normal and tearful mood  her affect is Normal range and intensity and Tearful, which is congruent, with her mood and the content of the session   The client has made progress on their goals  Karine Dan presents with a none risk of suicide, none risk of self-harm, and none risk of harm to others  For any risk assessment that surpasses a "low" rating, a safety plan must be developed  A safety plan was indicated: no  If yes, describe in detail     PLAN: Between sessions, Caridad Calixto will remember good memories of her father  At the next session, the therapist will use Supportive Psychotherapy to address loss of her father - processing memories  Behavioral Health Treatment Plan and Discharge Planning: Caridad Calixto is aware of and agrees to continue to work on their treatment plan  They have identified and are working toward their discharge goals  yes    Visit start and stop times:    03/08/23       Virtual Regular Visit    Verification of patient location:    Patient is located in the following state in which I hold an active license [place]      Assessment/Plan:    Problem List Items Addressed This Visit        Other    Adjustment disorder - Primary       Goals addressed in session: Goal 1          Reason for visit is   Chief Complaint   Patient presents with   • Virtual Regular Visit        Encounter provider Jeanette Forrester LCSW    Provider located at [address] [place] [id number] [phone]      Recent Visits  Date Type Provider Dept   03/01/23 Telemedicine Jeanette Forrester LCSW [facility]   Showing recent visits within past 7 days and meeting all other requirements  Future Appointments  No visits were found meeting these conditions  Showing future appointments within next 150 days and meeting all other requirements       The patient was identified by name and date of birth  Karine Dan was informed that this is a telemedicine visit and that the visit is being conducted throughthe [employer] platform   She agrees to proceed     My office door was closed  No one else was in the room  She acknowledged consent and understanding of privacy and security of the video platform  The patient has agreed to participate and understands they can discontinue the visit at any time  Patient is aware this is a billable service  Subjective  Karine Crane is a 39 y o  female Karine processed feelings of grief and loss about her father, and was able to also remember good memories and the ways she helped her father in his later years  Karine was tearful at times when talking about the loss of her father  HPI     Past Medical History:   Diagnosis Date   • Abnormal Pap smear of cervix    • Allergic rhinitis     last assessed-2012   • Anemia    • Chronic pain syndrome 2020   • SANDEEP III (cervical intraepithelial neoplasia grade III) with severe dysplasia     LEEP cone biopsy for SANDEEP-3 04  Done at the hospital   • Gestational diabetes     antepartum condition or prior complicated delivery   • Impacted cerumen of both ears     last assessed-2012   • Low back pain    • Oligomenorrhea    • Spinal stenosis    • Spinal stenosis of lumbar region without neurogenic claudication 2019   • Vaginal candidiasis     last assessed-2013   • Varicella     chickenpox as a child   • Vertigo     last assessed-2013       Past Surgical History:   Procedure Laterality Date   • CERVICAL BIOPSY  W/ LOOP ELECTRODE EXCISION  2004    Pathololgy with SANDEEP-2 to 3, with free margins but dysplasia extended to within 1mm of endocervical margin  2004   •  SECTION, LOW TRANSVERSE  2009    Primary low transverse  for arrest in the active phase at 4 cm with unengaged vertex despite hours of labor   • COLPOSCOPY W/ BIOPSY / CURETTAGE  2003   • EPIDURAL BLOCK INJECTION N/A 2020    Procedure: L4-L5 LUMBAR EPIDURAL STEROID INJECTION (90819);   Surgeon: Jenifer Martinez MD;  Location: [facility] OR;  Service: Pain Management    • FOOT SURGERY Right    • TOOTH EXTRACTION         Current Outpatient Medications   Medication Sig Dispense Refill   • benzonatate (TESSALON PERLES) 100 mg capsule Take 1 capsule (100 mg total) by mouth 3 (three) times a day as needed for cough 30 capsule 0   • carbamide peroxide (Debrox) 6 5 % otic solution      • fluticasone (FLONASE) 50 mcg/act nasal spray 1 spray into each nostril daily     • loratadine (Alavert) 10 MG dissolvable tablet      • LORazepam (Ativan) 0 5 mg tablet Take 1 tablet (0 5 mg total) by mouth every 8 (eight) hours as needed for anxiety 5 tablet 0   • triamcinolone (KENALOG) 0 1 % cream Apply topically 2 (two) times a day 30 g 0     No current facility-administered medications for this visit  No Known Allergies    Review of Systems    Video Exam    There were no vitals filed for this visit      Physical Exam     Visit Time    Visit Start Time: 6:00pm  Visit Stop Time: 6:50pm  Total Visit Duration: 50 minutes

## 2023-03-15 ENCOUNTER — TELEMEDICINE (OUTPATIENT)
Dept: PSYCHIATRY | Facility: CLINIC | Age: 43
End: 2023-03-15

## 2023-03-15 DIAGNOSIS — F43.22 ADJUSTMENT DISORDER WITH ANXIOUS MOOD: Primary | ICD-10-CM

## 2023-03-15 NOTE — BH CRISIS PLAN
Client Name: Kevin Ridley       Client YOB: 1980  : 1980    Treatment Team (include name and contact information):     Psychotherapist: Edda Milan    Psychiatrist: n/a   Release of information completed: no    :  n/a   Release of information completed: Other (Specify Role):     Release of information completed    Other (Specify Role):    Release of information completed:    Healthcare Provider   Name: Dr Ayan Eason   Address: [facility]   Telephone Number: [phone]    Type of Plan   * Child plans (children 15 yo and younger) must be completed and signed by the child's legal guardian   * Plans for all individuals 15 yo and above must be signed by the client  Plan Type: adolescent/adult (15 and over) Initial      My Personal Strengths are (in the client's own words):  Strive to be insightful, I have a tender heart, am very empathic, hard worker, have integrity, I love hard and strong and am loyal   I'm fun and funny and I crave knowledge, understanding, and like to be creative through painting, crafts, stencils, and wreath making  The stressors and triggers that may put me at risk are:  being physically tired, loneliness, feeling a lack of control, being treated unfairly, people (describe - names, etc) santos, mom, Dwaine Eastern occasionally, thoughts (describe) I've wronged someone or someone has wronged me, emotions (describe) loneliness, feeling wronged, frustrated and behaviors (describe) when people don't follow through, respect me or lack integrity    Coping skills I can use to keep myself calm and safe:  Physical activity, Call a friend or family member, [place]/meditate, Journal, Increased contact with professional supports and Other (describe) reading, meditation, deep breathing, crafts, thought stopping, positive/balanced self talk    Coping skills/supports I can use to maintain abstinence from substance use:        The people that provide me with help and support: (Include name, contact, and how they can help)   Support person #1: Chela Perez my     * Phone number: [phone]    * How can they help me? Lets me vent, lets me bounce ideas off him, gives me physical and verbal reassurance, helps with tasks   Support person #2: William Miller    * Phone number: in phone    * How can they help me? Talks to me     Support person #3: Eliza    * Phone number: in phone    * How can they help me? Good listener, gives advice, emphathizes with me, understands me    In the past, the following has helped me in times of crisis:    Being in a quiet space, Talking to a professional on the telephone, Calling a friend, Calling a family member, Taking a walk or exercising, Breathing exercises (or other mindfulness-based activities), Praying or meditating, Using de-escalation tools that I have learned, Listening to music, Watching television or a movie and Other: reading      If it is an emergency and you need immediate help, call 9-1-1    If there is a possibility of danger to yourself or others, call the following crisis hotline resources:     Adult Crisis Numbers  Suicide Prevention Hotline - Dial 9-8-8  [facility]: Loreta Rocha 13: R Rebeca 56: [address]: [address] [place] [address] Avenue: [address] [place] [address] Street: [address]: [phone]      In the event your feelings become unmanageable, and you cannot reach your support system, you will call 911 immediately or go to the nearest hospital emergency room

## 2023-03-15 NOTE — PSYCH
Behavioral Health Psychotherapy Progress Note    Psychotherapy Provided: Individual Psychotherapy     1  Adjustment disorder with anxious mood            Goals addressed in session: Goal 1     DATA: Karine shared that she is trying to get used to the time change  She shared she did wine and yoga class over the weekend which was fun for her  She shared she was feeling tired and was trying to continue to do self care for herself even though she is tired  Therapist asked about how she was feeling about her dad since last session she talked about him  She shares that she has some intrusive thoughts that happen when she is trying to get to sleep and can do some thought stopping  Karine shared that she sometimes catastrophizes and has intrusive thoughts  Therapist encouraged her to talk back to her thoughts and balance her thinking and she said she would try this  Karine talked about leaving flowers on his grave and she does this to remember him  She shares she has a lot of emotions when she goes to [employer]  She talked about the  of her dad and she shared details of this and said he is buried in a veterans area of [place]  She shared that her friends gathered around her and cared for her and showed they love her  She shared this was meaningful for her  Therapist and Karine completed her crisis plan during this session  During this session, this clinician used the following therapeutic modalities: Cognitive Behavioral Therapy and Supportive Psychotherapy    Substance Abuse was not addressed during this session  If the client is diagnosed with a co-occurring substance use disorder, please indicate any changes in the frequency or amount of use:   Stage of change for addressing substance use diagnoses: No substance use/Not applicable    ASSESSMENT:  Karine Dan presents with a Euthymic/ normal mood       her affect is Normal range and intensity, which is congruent, with her mood and the content of the session  The client has made progress on their goals  Karine Dan presents with a none risk of suicide, none risk of self-harm, and none risk of harm to others  For any risk assessment that surpasses a "low" rating, a safety plan must be developed  A safety plan was indicated: no  If yes, describe in detail     PLAN: Between sessions, Karine Dan will balance what if thinking  At the next session, the therapist will use Cognitive Behavioral Therapy and Supportive Psychotherapy to address negative thinking, process loss of father  Behavioral Health Treatment Plan and Discharge Planning: Diego Rivers is aware of and agrees to continue to work on their treatment plan  They have identified and are working toward their discharge goals  yes    Visit start and stop times:    03/15/23       Virtual Regular Visit    Verification of patient location:    Patient is located in the following state in which I hold an active license [place]      Assessment/Plan:    Problem List Items Addressed This Visit        Other    Adjustment disorder - Primary       Goals addressed in session: Goal 1          Reason for visit is   Chief Complaint   Patient presents with   • Virtual Regular Visit        Encounter provider Arthur Guerin LCSW    Provider located at [address] [place] [address] [id number] [phone]      Recent Visits  Date Type Provider Dept   03/08/23 Telemedicine Arthur Guerin LCSW [facility]   Showing recent visits within past 7 days and meeting all other requirements  Today's Visits  Date Type Provider Dept   03/15/23 Telemedicine Arthur Guerin LCSW [facility]   Showing today's visits and meeting all other requirements  Future Appointments  No visits were found meeting these conditions    Showing future appointments within next 150 days and meeting all other requirements       The patient was identified by name and date of birth  Karine Dan was informed that this is a telemedicine visit and that the visit is being conducted throughthe Oculus360 platform  She agrees to proceed     My office door was closed  No one else was in the room  She acknowledged consent and understanding of privacy and security of the video platform  The patient has agreed to participate and understands they can discontinue the visit at any time  Patient is aware this is a billable service  Subjective  Karine Robledo is a 39 y o  female Karine appeared calm and cooperative and was able to openly communicate thoughts and feelings during session  Karine was able to balance some negative what-if thinking as well   HPI     Past Medical History:   Diagnosis Date   • Abnormal Pap smear of cervix    • Allergic rhinitis     last assessed-2012   • Anemia    • Chronic pain syndrome 2020   • SANDEEP III (cervical intraepithelial neoplasia grade III) with severe dysplasia     LEEP cone biopsy for SANDEEP-3 04  Done at the hospital   • Gestational diabetes     antepartum condition or prior complicated delivery   • Impacted cerumen of both ears     last assessed-2012   • Low back pain    • Oligomenorrhea    • Spinal stenosis    • Spinal stenosis of lumbar region without neurogenic claudication 2019   • Vaginal candidiasis     last assessed-2013   • Varicella     chickenpox as a child   • Vertigo     last assessed-2013       Past Surgical History:   Procedure Laterality Date   • CERVICAL BIOPSY  W/ LOOP ELECTRODE EXCISION  2004    Pathololgy with SANDEEP-2 to 3, with free margins but dysplasia extended to within 1mm of endocervical margin    2004   •  SECTION, LOW TRANSVERSE  2009    Primary low transverse  for arrest in the active phase at 4 cm with unengaged vertex despite hours of labor   • COLPOSCOPY W/ BIOPSY / CURETTAGE  12/19/2003   • EPIDURAL BLOCK INJECTION N/A 7/9/2020    Procedure: L4-L5 LUMBAR EPIDURAL STEROID INJECTION (59708); Surgeon: Estrada Lynch MD;  Location: [facility] MAIN OR;  Service: Pain Management    • FOOT SURGERY Right    • TOOTH EXTRACTION         Current Outpatient Medications   Medication Sig Dispense Refill   • benzonatate (TESSALON PERLES) 100 mg capsule Take 1 capsule (100 mg total) by mouth 3 (three) times a day as needed for cough 30 capsule 0   • carbamide peroxide (Debrox) 6 5 % otic solution      • fluticasone (FLONASE) 50 mcg/act nasal spray 1 spray into each nostril daily     • loratadine (Alavert) 10 MG dissolvable tablet      • LORazepam (Ativan) 0 5 mg tablet Take 1 tablet (0 5 mg total) by mouth every 8 (eight) hours as needed for anxiety 5 tablet 0   • triamcinolone (KENALOG) 0 1 % cream Apply topically 2 (two) times a day 30 g 0     No current facility-administered medications for this visit  No Known Allergies    Review of Systems    Video Exam    There were no vitals filed for this visit      Physical Exam     Visit Time    Visit Start Time: 6:00pm  Visit Stop Time: 6:52pm  Total Visit Duration: 52 minutes        Virtual Regular Visit    Verification of patient location:    Patient is located in the following state in which I hold an active license [place]      Assessment/Plan:    Problem List Items Addressed This Visit        Other    Adjustment disorder - Primary       Goals addressed in session: Goal 1          Reason for visit is   Chief Complaint   Patient presents with   • Virtual Regular Visit        Encounter provider Noah Alvarez LCSW    Provider located at [address] [place] [id number] [phone]      Recent Visits  Date Type Provider Dept   03/08/23 Telemedicine Noah Alvarez LCSW [facility]   Showing recent visits within past 7 days and meeting all other requirements  Today's Visits  Date Type Provider Dept   03/15/23 Telemedicine Maxx Leroy LCSW [facility]   Showing today's visits and meeting all other requirements  Future Appointments  No visits were found meeting these conditions  Showing future appointments within next 150 days and meeting all other requirements       The patient was identified by name and date of birth  Karine Dan was informed that this is a telemedicine visit and that the visit is being conducted throughthe dooyoo platform  She agrees to proceed     My office door was closed  No one else was in the room  She acknowledged consent and understanding of privacy and security of the video platform  The patient has agreed to participate and understands they can discontinue the visit at any time  Patient is aware this is a billable service  Subjective        HPI     Past Medical History:   Diagnosis Date   • Abnormal Pap smear of cervix    • Allergic rhinitis     last assessed-2012   • Anemia    • Chronic pain syndrome 2020   • SANDEEP III (cervical intraepithelial neoplasia grade III) with severe dysplasia     LEEP cone biopsy for SANDEEP-3 04  Done at the hospital   • Gestational diabetes     antepartum condition or prior complicated delivery   • Impacted cerumen of both ears     last assessed-2012   • Low back pain    • Oligomenorrhea    • Spinal stenosis    • Spinal stenosis of lumbar region without neurogenic claudication 2019   • Vaginal candidiasis     last assessed-2013   • Varicella     chickenpox as a child   • Vertigo     last assessed-2013       Past Surgical History:   Procedure Laterality Date   • CERVICAL BIOPSY  W/ LOOP ELECTRODE EXCISION  2004    Pathololgy with SANDEEP-2 to 3, with free margins but dysplasia extended to within 1mm of endocervical margin    2004   •  SECTION, LOW TRANSVERSE  2009    Primary low transverse  for arrest in the active phase at 4 cm with unengaged vertex despite hours of labor   • COLPOSCOPY W/ BIOPSY / CURETTAGE  2003   • EPIDURAL BLOCK INJECTION N/A 2020    Procedure: L4-L5 LUMBAR EPIDURAL STEROID INJECTION (25569); Surgeon: Carlos Manuel Lin MD;  Location: [facility] MAIN OR;  Service: Pain Management    • FOOT SURGERY Right    • TOOTH EXTRACTION         Current Outpatient Medications   Medication Sig Dispense Refill   • benzonatate (TESSALON PERLES) 100 mg capsule Take 1 capsule (100 mg total) by mouth 3 (three) times a day as needed for cough 30 capsule 0   • carbamide peroxide (Debrox) 6 5 % otic solution      • fluticasone (FLONASE) 50 mcg/act nasal spray 1 spray into each nostril daily     • loratadine (Alavert) 10 MG dissolvable tablet      • LORazepam (Ativan) 0 5 mg tablet Take 1 tablet (0 5 mg total) by mouth every 8 (eight) hours as needed for anxiety 5 tablet 0   • triamcinolone (KENALOG) 0 1 % cream Apply topically 2 (two) times a day 30 g 0     No current facility-administered medications for this visit          No Known Allergies    Review of Systems

## 2023-03-22 ENCOUNTER — TELEMEDICINE (OUTPATIENT)
Dept: PSYCHIATRY | Facility: CLINIC | Age: 43
End: 2023-03-22

## 2023-03-22 DIAGNOSIS — F43.20 ADJUSTMENT DISORDER, UNSPECIFIED TYPE: Primary | ICD-10-CM

## 2023-03-22 NOTE — PSYCH
Behavioral Health Psychotherapy Progress Note    Psychotherapy Provided: Individual Psychotherapy     1  Adjustment disorder, unspecified type            Goals addressed in session: Goal 1     DATA: Karine shared that she was doing good and shared that her hot yoga class was good and she and her friends made it through the whole class  She shared she enjoyed it and will be going back for another class  She shared she went to a [employer] which was fun for her as well  She shared her brother has been texting her mom and aunt about his hip issues and his job situation  He asked his aunt to fill out [place] paperwork and she said no to that request  She shared that she is afraid of what might be up her brother's sleeve in terms of being manipulative  She shared more memories about her father including her half brother Jeaneth Rudd who has been violent towards her dad  She shares more memories about her dad's passing including making arrangements for his estate  She shared she had a relationship with Nancylaurief[place] Cross her dad's girlfriend and was excited about having another sibling  She was 14 when Jeaneth Rudd was born  She remembered her dad leaving her mom and having girlfriends when they were  but being faithful to Giovani Sos  Therapist asked about Karine's mom and her dad's girlfriend both having lack of boundaries and Karine shared she feels that her mom and girlfriend both have borderline personality disorder  She shared some insights into generational patterns with her dad rescuing her mom from a difficult home and rescuing Giovani Sos as well  She shared that she remembers her parents fighting over money and that she moved many times before she graduated high school  She shared that she has difficulty being vulnerable at times because she feels she has to keep things under control  She shared her dad didn't talk about his negative feelings and thoughts the same way she doesn't at times      During this session, this clinician used the following therapeutic modalities: Supportive Psychotherapy    Substance Abuse was not addressed during this session  If the client is diagnosed with a co-occurring substance use disorder, please indicate any changes in the frequency or amount of use:   Stage of change for addressing substance use diagnoses: No substance use/Not applicable    ASSESSMENT:  Karine Dan presents with a Euthymic/ normal mood  her affect is Normal range and intensity, which is congruent, with her mood and the content of the session  The client has made progress on their goals  Karine Dan presents with a none risk of suicide, none risk of self-harm, and none risk of harm to others  For any risk assessment that surpasses a "low" rating, a safety plan must be developed  A safety plan was indicated: no  If yes, describe in detail     PLAN: Between sessions, Karine Dan will work on expressing feelings instead of keeping them in   At the next session, the therapist will use Solution-Focused Therapy and Supportive Psychotherapy to address possible past trauma, thoughts and feelings about past events  Behavioral Health Treatment Plan and Discharge Planning: Douglas Mendiola is aware of and agrees to continue to work on their treatment plan  They have identified and are working toward their discharge goals   yes    Visit start and stop times:    03/22/23       Virtual Regular Visit    Verification of patient location:    Patient is located in the following state in which I hold an active license [place]      Assessment/Plan:    Problem List Items Addressed This Visit        Other    Adjustment disorder - Primary       Goals addressed in session: Goal 1          Reason for visit is   Chief Complaint   Patient presents with   • Virtual Regular Visit        Encounter provider Rosabel Saint, LCSW    Provider located at [address] [place] [address] JERRI  [place] [id number] [phone]      Recent Visits  Date Type Provider Dept   03/15/23 Telemedicine Mariah King LCSW [facility]   Showing recent visits within past 7 days and meeting all other requirements  Future Appointments  No visits were found meeting these conditions  Showing future appointments within next 150 days and meeting all other requirements       The patient was identified by name and date of birth  Karine Dan was informed that this is a telemedicine visit and that the visit is being conducted throughthe Doximity platform  She agrees to proceed     My office door was closed  No one else was in the room  She acknowledged consent and understanding of privacy and security of the video platform  The patient has agreed to participate and understands they can discontinue the visit at any time  Patient is aware this is a billable service  Subjective  Karine Seay is a 39 y o  female Karine appeared calm and cooperative and was able to openly communicate thoughts and feelings about her week as well as process feelings about her dad passing away and her family dysfunctions going back generations  Karine agreed to do a PTSD screening in next session to see if she has symptoms, and Karine agreed to a personal goal of saying some things out loud instead of keeping them bottled up  HPI     Past Medical History:   Diagnosis Date   • Abnormal Pap smear of cervix    • Allergic rhinitis     last assessed-12/31/2012   • Anemia    • Chronic pain syndrome 6/11/2020   • SANDEEP III (cervical intraepithelial neoplasia grade III) with severe dysplasia     LEEP cone biopsy for SANDEEP-3 2/25/04    Done at the hospital   • Gestational diabetes     antepartum condition or prior complicated delivery   • Impacted cerumen of both ears     last assessed-6/13/2012   • Low back pain    • Oligomenorrhea    • Spinal stenosis    • Spinal stenosis of lumbar region without neurogenic claudication 2019   • Vaginal candidiasis     last assessed-2013   • Varicella     chickenpox as a child   • Vertigo     last assessed-2013       Past Surgical History:   Procedure Laterality Date   • CERVICAL BIOPSY  W/ LOOP ELECTRODE EXCISION  2004    Pathololgy with SNADEEP-2 to 3, with free margins but dysplasia extended to within 1mm of endocervical margin  2004   •  SECTION, LOW TRANSVERSE  2009    Primary low transverse  for arrest in the active phase at 4 cm with unengaged vertex despite hours of labor   • COLPOSCOPY W/ BIOPSY / CURETTAGE  2003   • EPIDURAL BLOCK INJECTION N/A 2020    Procedure: L4-L5 LUMBAR EPIDURAL STEROID INJECTION (66845); Surgeon: Ana Jenkins MD;  Location: [facility] MAIN OR;  Service: Pain Management    • FOOT SURGERY Right    • TOOTH EXTRACTION         Current Outpatient Medications   Medication Sig Dispense Refill   • benzonatate (TESSALON PERLES) 100 mg capsule Take 1 capsule (100 mg total) by mouth 3 (three) times a day as needed for cough 30 capsule 0   • carbamide peroxide (Debrox) 6 5 % otic solution      • fluticasone (FLONASE) 50 mcg/act nasal spray 1 spray into each nostril daily     • loratadine (Alavert) 10 MG dissolvable tablet      • LORazepam (Ativan) 0 5 mg tablet Take 1 tablet (0 5 mg total) by mouth every 8 (eight) hours as needed for anxiety 5 tablet 0   • triamcinolone (KENALOG) 0 1 % cream Apply topically 2 (two) times a day 30 g 0     No current facility-administered medications for this visit  No Known Allergies    Review of Systems    Video Exam    There were no vitals filed for this visit      Physical Exam     Visit Time    Visit Start Time: 6:00pm  Visit Stop Time: 6:52pm  Total Visit Duration: 52 minutes

## 2023-03-24 ENCOUNTER — OFFICE VISIT (OUTPATIENT)
Dept: GYNECOLOGY | Facility: CLINIC | Age: 43
End: 2023-03-24

## 2023-03-24 VITALS
DIASTOLIC BLOOD PRESSURE: 82 MMHG | HEIGHT: 64 IN | BODY MASS INDEX: 30.52 KG/M2 | SYSTOLIC BLOOD PRESSURE: 128 MMHG | WEIGHT: 178.8 LBS

## 2023-03-24 DIAGNOSIS — Z97.5 IUD CONTRACEPTION: Primary | ICD-10-CM

## 2023-03-24 DIAGNOSIS — N91.4 SECONDARY OLIGOMENORRHEA: ICD-10-CM

## 2023-03-24 NOTE — PROGRESS NOTES
Assessment/Plan   Diagnoses and all orders for this visit:    IUD contraception    Secondary oligomenorrhea    1  IUD check-Kyleena IUD was placed on 2/24/2023  It is in good position on exam   IUD string seen at a length of 2 5 to 3 cm  She denies any complaints  She had light bleeding on the day of insertion with no further issues  She has been sexually active without issues  Call return with any issues  2   History of secondary oligomenorrhea- she continues with this on the new Daegis  To call or return with any issues  3  history of severe dysplasia-status post LEEP cone biopsy 2004 with negative testing since then  Pap with HPV was negative from 4/12/2021  Plan to repeat Pap with HPV at yearly exam in the next 2 to 3 months time as scheduled  4   Dense breast tissue- previously counseled about 3D mammogram/ABUS  To review at upcoming yearly exam   5  other- with mole with HPV from his penis  With HPV testing was negative at last visit as noted  We will recheck at upcoming visit  Follow-up 2 months for yearly exam or as needed  Subjective   Patient ID: Vincent Ellison is a 39 y o  female  Vitals:    03/24/23 1415   BP: 128/82     HPI    The following portions of the patient's history were reviewed and updated as appropriate: allergies, current medications, past family history, past medical history, past social history, past surgical history and problem list   Past Medical History:   Diagnosis Date   • Abnormal Pap smear of cervix    • Allergic rhinitis     last assessed-12/31/2012   • Anemia    • Chronic pain syndrome 6/11/2020   • SANDEEP III (cervical intraepithelial neoplasia grade III) with severe dysplasia     LEEP cone biopsy for SANDEEP-3 2/25/04    Done at the hospital   • Gestational diabetes     antepartum condition or prior complicated delivery   • Impacted cerumen of both ears     last assessed-6/13/2012   • Low back pain    • Oligomenorrhea    • Spinal stenosis    • Spinal stenosis of lumbar region without neurogenic claudication 2019   • Vaginal candidiasis     last assessed-2013   • Varicella     chickenpox as a child   • Vertigo     last assessed-2013     Past Surgical History:   Procedure Laterality Date   • CERVICAL BIOPSY  W/ LOOP ELECTRODE EXCISION  2004    Pathololgy with SANDEEP-2 to 3, with free margins but dysplasia extended to within 1mm of endocervical margin  2004   •  SECTION, LOW TRANSVERSE  2009    Primary low transverse  for arrest in the active phase at 4 cm with unengaged vertex despite hours of labor   • COLPOSCOPY W/ BIOPSY / CURETTAGE  2003   • EPIDURAL BLOCK INJECTION N/A 2020    Procedure: L4-L5 LUMBAR EPIDURAL STEROID INJECTION (70024); Surgeon: Jere Parsons MD;  Location: [facility] MAIN OR;  Service: Pain Management    • FOOT SURGERY Right    • TOOTH EXTRACTION       OB History    Para Term  AB Living   1 1 1     1   SAB IAB Ectopic Multiple Live Births           1      # Outcome Date GA Lbr Gregory/2nd Weight Sex Delivery Anes PTL Lv   1 Term               Obstetric Comments    1       Current Outpatient Medications:   •  fluticasone (FLONASE) 50 mcg/act nasal spray, 1 spray into each nostril daily, Disp: , Rfl:   •  loratadine (Alavert) 10 MG dissolvable tablet, , Disp: , Rfl:   •  triamcinolone (KENALOG) 0 1 % cream, Apply topically 2 (two) times a day, Disp: 30 g, Rfl: 0  •  carbamide peroxide (Debrox) 6 5 % otic solution, , Disp: , Rfl:   No Known Allergies  Social History     Socioeconomic History   • Marital status: /Civil Union     Spouse name: Not on file   • Number of children: 1   • Years of education: Not on file   • Highest education level: Not on file   Occupational History   • Not on file   Tobacco Use   • Smoking status: Never   • Smokeless tobacco: Never   Vaping Use   • Vaping Use: Never used   Substance and Sexual Activity   • Alcohol use:  Yes     Alcohol/week: 2 0 standard drinks     Types: 2 Glasses of wine per week   • Drug use: No   • Sexual activity: Yes     Partners: Male     Birth control/protection: I U D  Other Topics Concern   • Not on file   Social History Narrative    Caffeine use    [employer] Affiliation [employer] ([address] [place] [address])    Uses safety equipment-seatbelts     Social Determinants of Health     Financial Resource Strain: Not on file   Food Insecurity: Not on file   Transportation Needs: Not on file   Physical Activity: Not on file   Stress: Not on file   Social Connections: Not on file   Intimate Partner Violence: Not on file   Housing Stability: Not on file     Family History   Problem Relation Age of Onset   • Skin cancer Mother    • Hypertension Father    • Kidney failure Father    • Hypertension Maternal Grandmother    • Lung cancer Maternal Grandmother    • Cancer Maternal Grandfather         bladder   • Hypertension Maternal Grandfather    • Lung cancer Paternal Grandmother    • Heart disease Paternal Grandfather    • No Known Problems Brother    • No Known Problems Son    • No Known Problems Maternal Aunt    • No Known Problems Maternal Aunt    • No Known Problems Maternal Uncle    • No Known Problems Paternal Aunt    • No Known Problems Paternal Uncle        Review of Systems    Objective   Physical Exam  OBGyn Exam     Objective      /82 (BP Location: Left arm, Patient Position: Sitting, Cuff Size: Standard)   Ht 5' 4" (1 626 m)   Wt 81 1 kg (178 lb 12 8 oz)   BMI 30 69 kg/m²     General:   alert and oriented, in no acute distress   Neck:    Breast:    Heart:    Lungs:    Abdomen: soft, non-tender, without masses or organomegaly   Vulva: normal   Vagina: Without erythema or lesions or discharge  Normal   Cervix: Without lesions or discharge or cervicitis  No Cervical motion tenderness  IUD string seen at a length of 2 5 to 3 cm     Uterus: top normal size, anteverted, non-tender   Adnexa: no mass, fullness, tenderness   Rectum: deferred Psych:  Normal mood and affect   Skin:  Without obvious lesions   Eyes: symmetric, with normal movements and reactivity   Musculoskeletal:  Normal muscle tone and movements appreciated

## 2023-03-29 ENCOUNTER — TELEMEDICINE (OUTPATIENT)
Dept: PSYCHIATRY | Facility: CLINIC | Age: 43
End: 2023-03-29

## 2023-03-29 DIAGNOSIS — F43.20 ADJUSTMENT DISORDER, UNSPECIFIED TYPE: Primary | ICD-10-CM
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
## 2023-04-05 ENCOUNTER — TELEMEDICINE (OUTPATIENT)
Dept: PSYCHIATRY | Facility: CLINIC | Age: 43
End: 2023-04-05

## 2023-04-05 DIAGNOSIS — F43.20 ADJUSTMENT DISORDER, UNSPECIFIED TYPE: Primary | ICD-10-CM
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
## 2023-04-26 ENCOUNTER — TELEMEDICINE (OUTPATIENT)
Dept: PSYCHIATRY | Facility: CLINIC | Age: 43
End: 2023-04-26

## 2023-04-26 DIAGNOSIS — F43.20 ADJUSTMENT DISORDER, UNSPECIFIED TYPE: Primary | ICD-10-CM

## 2023-04-26 NOTE — PSYCH
"Behavioral Health Psychotherapy Progress Note    Psychotherapy Provided: Individual Psychotherapy     1  Adjustment disorder, unspecified type            Goals addressed in session: Goal 1     DATA: Karine shared that she was feeling \"pretty good\" and has been going to yoga which has been helpful  She shared that her brother has been calling her aunt and asking her to fill out paperwork and Karine helped her to draw boundaries to say no to him  She shared things have been stressful at work because the numbers are low and she has been feeling stress over this  She shares she is worried about being laid off because this has happened in the past   Therapist asked if she had been thinking about or missing her dad recently and she said there are times when she thinks about him and misses him at times as well  Therapist and Salina Dominguez continued her timeline during session and she covered age 12 on with moving around moving to an apartment in [place] and getting her own room, having a first boyfriend, feeling out of place with others she went to school with who had more money, moved into house with her aunt and mom, graduated from high school and was very busy in high school  Therapist asked how her relationship w mom was and she said good  She said she did not want her mother to be angry with her when she was a kid  She shared that her mom humiliated her brother when he was caught shoplifting by making him call family members to tell them what he had done  She shared that her mom took her money and clothes and felt that people \"owed\" her things  She talked about setting a boundary age 31 when her mom took out cell phones in her name and didn't pay it and then didn't have the money to pay it  She shared her mother stopped talking to her for months after this, and Karine shared things got better after that over the years    Karine talked about her continued efforts to hold boundaries over the years with her mom and they " "are in a good place with each other  She shared that she appreciates talking about her timeline and that she understands herself better in looking at what she has been through and where she is now  During this session, this clinician used the following therapeutic modalities: EMDR (or other form of bilateral Stimulation) and Supportive Psychotherapy    Substance Abuse was not addressed during this session  If the client is diagnosed with a co-occurring substance use disorder, please indicate any changes in the frequency or amount of use:   Stage of change for addressing substance use diagnoses: No substance use/Not applicable    ASSESSMENT:  Karine Dan presents with a Euthymic/ normal mood  her affect is Normal range and intensity, which is congruent, with her mood and the content of the session  The client has made progress on their goals  Karine Dan presents with a none risk of suicide, none risk of self-harm, and none risk of harm to others  For any risk assessment that surpasses a \"low\" rating, a safety plan must be developed  A safety plan was indicated: no  If yes, describe in detail     PLAN: Between sessions, Karina Cowart will think about her past and how it has shaped her interactions with others now  At the next session, the therapist will use Dialectical Behavior Therapy, EMDR (or other form of bilateral Stimulation) and Supportive Psychotherapy to address adjustment disorder symptoms  Behavioral Health Treatment Plan and Discharge Planning: Karina Cowart is aware of and agrees to continue to work on their treatment plan  They have identified and are working toward their discharge goals   yes    Visit start and stop times:    04/26/23       Virtual Regular Visit    Verification of patient location:    Patient is located at Home in the following state in which I hold an active license [place]      Assessment/Plan:    Problem List Items Addressed This Visit        Other    " Adjustment disorder - Primary       Goals addressed in session: Goal 1          Reason for visit is No chief complaint on file  Encounter provider Garry Billingsley LCSW    Provider located at [address] [place] [address] [place] [address] [phone]      Recent Visits  No visits were found meeting these conditions  Showing recent visits within past 7 days and meeting all other requirements  Future Appointments  No visits were found meeting these conditions  Showing future appointments within next 150 days and meeting all other requirements       The patient was identified by name and date of birth  Karine Dan was informed that this is a telemedicine visit and that the visit is being conducted throughthe [employer] platform  She agrees to proceed     My office door was closed  No one else was in the room  She acknowledged consent and understanding of privacy and security of the video platform  The patient has agreed to participate and understands they can discontinue the visit at any time  Patient is aware this is a billable service  Subjective  Karine Calvillo is a 39 y o  female Karine appeared calm and cooperative and was able to openly communicate thoughts and feelings during session and understand her timeline and her interactions with her family members and how they have changed over the years   HPI     Past Medical History:   Diagnosis Date   • Abnormal Pap smear of cervix    • Allergic rhinitis     last assessed-12/31/2012   • Anemia    • Chronic pain syndrome 6/11/2020   • SANDEEP III (cervical intraepithelial neoplasia grade III) with severe dysplasia     LEEP cone biopsy for SANDEEP-3 2/25/04    Done at the hospital   • Gestational diabetes     antepartum condition or prior complicated delivery   • Impacted cerumen of both ears     last assessed-6/13/2012   • Low back pain    • Oligomenorrhea    • Spinal stenosis    • Spinal stenosis of lumbar region without neurogenic claudication 2019   • Vaginal candidiasis     last assessed-2013   • Varicella     chickenpox as a child   • Vertigo     last assessed-2013       Past Surgical History:   Procedure Laterality Date   • CERVICAL BIOPSY  W/ LOOP ELECTRODE EXCISION  2004    Pathololgy with SANDEEP-2 to 3, with free margins but dysplasia extended to within 1mm of endocervical margin  2004   •  SECTION, LOW TRANSVERSE  2009    Primary low transverse  for arrest in the active phase at 4 cm with unengaged vertex despite hours of labor   • COLPOSCOPY W/ BIOPSY / CURETTAGE  2003   • EPIDURAL BLOCK INJECTION N/A 2020    Procedure: L4-L5 LUMBAR EPIDURAL STEROID INJECTION (84725); Surgeon: Koffi Thompson MD;  [facility] MAIN OR;  Service: Pain Management    • FOOT SURGERY Right    • TOOTH EXTRACTION         Current Outpatient Medications   Medication Sig Dispense Refill   • carbamide peroxide (Debrox) 6 5 % otic solution  (Patient not taking: Reported on 3/24/2023)     • fluticasone (FLONASE) 50 mcg/act nasal spray 1 spray into each nostril daily     • loratadine (Alavert) 10 MG dissolvable tablet      • triamcinolone (KENALOG) 0 1 % cream Apply topically 2 (two) times a day 30 g 0     No current facility-administered medications for this visit  No Known Allergies    Review of Systems    Video Exam    There were no vitals filed for this visit      Physical Exam     Visit Time    Visit Start Time: 600pm  Visit Stop Time: 650pm  Total Visit Duration: 50 minutes

## 2023-05-03 ENCOUNTER — TELEMEDICINE (OUTPATIENT)
Dept: PSYCHIATRY | Facility: CLINIC | Age: 43
End: 2023-05-03

## 2023-05-03 DIAGNOSIS — F43.20 ADJUSTMENT DISORDER, UNSPECIFIED TYPE: Primary | ICD-10-CM

## 2023-05-03 NOTE — PSYCH
"Behavioral Health Psychotherapy Progress Note    Psychotherapy Provided: Individual Psychotherapy     1  Adjustment disorder, unspecified type            Goals addressed in session: Goal 1     DATA:   Karine shared she is feeling \"good\" today  Therapist reviewed treatment plan and asked about family dynamics that she had a goal for improving and she said this has been doing well with these dynamics  She shares that what remains difficult with him is the stress he puts on her mom and her aunt  She shared that she is doing well with work except for wanting more to do at work in terms of doing some new things  Karine shared that she has not been thinking about her dad recently except when she thinks about serving sizes  Karine and therapist continued her timeline from age 15 through hs graduation and college and graduate school and meeting Marci Abrams her   They got  in 2006, built a house and had Lizzy Marcos in 2009  During this session, this clinician used the following therapeutic modalities: Supportive Psychotherapy    Substance Abuse was not addressed during this session  If the client is diagnosed with a co-occurring substance use disorder, please indicate any changes in the frequency or amount of use: noneStage of change for addressing substance use diagnoses: No substance use/Not applicable    ASSESSMENT:  Karine Dan presents with a Euthymic/ normal mood  her affect is Normal range and intensity, which is congruent, with her mood and the content of the session  The client has made progress on their goals  Karine Dan presents with a none risk of suicide, none risk of self-harm, and none risk of harm to others  For any risk assessment that surpasses a \"low\" rating, a safety plan must be developed  A safety plan was indicated: no  If yes, describe in detail     PLAN: Between sessions, Karine Dan will continue to hold boundaries with family when needed   At the next session, " the therapist will use Supportive Psychotherapy to address interpersonal patterns, process loss of her dad  Behavioral Health Treatment Plan and Discharge Planning: Martina Li is aware of and agrees to continue to work on their treatment plan  They have identified and are working toward their discharge goals  yes    Visit start and stop times:    05/03/23  Start Time: 1800  Stop Time: 1850  Total Visit Time: 50 minutes    Virtual Regular Visit    Verification of patient location:    Patient is located at Home in the following state in which I hold an active license [place]      Assessment/Plan:    Problem List Items Addressed This Visit        Other    Adjustment disorder - Primary       Goals addressed in session: Goal 1          Reason for visit is No chief complaint on file  Encounter provider Cedric Sinclair LCSW    Provider located at [address] [place] [id number] [phone]      Recent Visits  Date Type Provider Dept   04/26/23 Telemedicine Cedric Sinclair LCSW [facility]   Showing recent visits within past 7 days and meeting all other requirements  Today's Visits  Date Type Provider Dept   05/03/23 [facility]medicine [facility]   Showing today's visits and meeting all other requirements  Future Appointments  No visits were found meeting these conditions  Showing future appointments within next 150 days and meeting all other requirements       The patient was identified by name and date of birth  Karine Dan was informed that this is a telemedicine visit and that the visit is being conducted throughthe [employer] platform  She agrees to proceed     My office door was closed  No one else was in the room  She acknowledged consent and understanding of privacy and security of the video platform   The patient has agreed to participate and understands they can discontinue the visit at any time  Patient is aware this is a billable service  Subjective  Karine Tilley is a 39 y o  female Karine appeared calm and cooperative and was able to openly communicate about her timeline and her family dynamics   HPI     Past Medical History:   Diagnosis Date    Abnormal Pap smear of cervix     Allergic rhinitis     last assessed-2012    Anemia     Chronic pain syndrome 2020    SANDEEP III (cervical intraepithelial neoplasia grade III) with severe dysplasia     LEEP cone biopsy for SANDEEP-3 04  Done at the hospital    Gestational diabetes     antepartum condition or prior complicated delivery    Impacted cerumen of both ears     last assessed-2012    Low back pain     Oligomenorrhea     Spinal stenosis     Spinal stenosis of lumbar region without neurogenic claudication 2019    Vaginal candidiasis     last assessed-2013    Varicella     chickenpox as a child    Vertigo     last assessed-2013       Past Surgical History:   Procedure Laterality Date    CERVICAL BIOPSY  W/ LOOP ELECTRODE EXCISION  2004    Pathololgy with SANDEEP-2 to 3, with free margins but dysplasia extended to within 1mm of endocervical margin  2004     SECTION, LOW TRANSVERSE  2009    Primary low transverse  for arrest in the active phase at 4 cm with unengaged vertex despite hours of labor    COLPOSCOPY W/ BIOPSY / CURETTAGE  2003    EPIDURAL BLOCK INJECTION N/A 2020    Procedure: L4-L5 LUMBAR EPIDURAL STEROID INJECTION (18107);   Surgeon: Wilfredo Cope MD;  Location: [facility] MAIN OR;  Service: Pain Management     FOOT SURGERY Right     TOOTH EXTRACTION         Current Outpatient Medications   Medication Sig Dispense Refill    carbamide peroxide (Debrox) 6 5 % otic solution  (Patient not taking: Reported on 3/24/2023)      fluticasone (FLONASE) 50 mcg/act nasal spray 1 spray into each nostril daily      loratadine (Alavert) 10 MG dissolvable tablet       triamcinolone (KENALOG) 0 1 % cream Apply topically 2 (two) times a day 30 g 0     No current facility-administered medications for this visit  No Known Allergies    Review of Systems    Video Exam    There were no vitals filed for this visit      Physical Exam

## 2023-05-04 NOTE — PROGRESS NOTES
[facility]      Date of Service: 23  Primary Care Provider:   Kiley Mata MD     Name: Damion Anand       : 1980       Age:43 y o  Sex: female      MRN: [id number]      Chief Complaint:Physical Exam     Assessment and Plan:  39 y o  female exam      1  Health Maintenance  - Colon Cancer Screening: start at age 38   - Cervical Cancer Screening: follows GYN, last PAP 2021 normal with negative HPV  - Breast Cancer Screening: last mammo in 2022, ABUS pending due to dense breasts  - Labs: as below   - Immunizations: Reviewed  Recommend yearly flu vaccine  2  Other diagnoses addressed today:   Problem List Items Addressed This Visit        Other    Dense breast tissue on mammogram   Other Visit Diagnoses     Annual physical exam    -  Primary    BMI 30 0-30 9,adult               Immunizations and preventive care screenings were discussed with patient today  Appropriate education was printed on patient's after visit summary  Counseling:  Alcohol/drug use: discussed moderation in alcohol intake, the recommendations for healthy alcohol use, and avoidance of illicit drug use  Dental Health: discussed importance of regular tooth brushing, flossing, and dental visits  Injury prevention: discussed safety/seat belts, safety helmets, smoke detectors, carbon dioxide detectors    Exercise: the importance of regular exercise/physical activity was discussed  Recommend exercise 3-5 times per week for at least 30 minutes  BMI Counseling: Body mass index is 30 9 kg/m²  The BMI is above normal  Nutrition recommendations include decreasing portion sizes, encouraging healthy choices of fruits and vegetables, consuming healthier snacks and reducing intake of saturated and trans fat  Exercise recommendations include moderate physical activity 150 minutes/week and strength training exercises  Rationale for BMI follow-up plan is due to patient being overweight or obese  Depression Screening and Follow-up Plan: Patient was screened for depression during today's encounter  They screened negative with a PHQ-2 score of 0  Follow up next physical in 1 year  Subjective:    Marianne Peña is a 39 y o  female and is here for a comprehensive physical exam      Acute Complaints: None  Diet and Physical Activity  Diet/Nutrition: does follow a well balanced diet  Does weight watchers   Exercise: gym 3 times a week, yoga     General Health  Vision: no vision problems and goes for regular eye exams  Dental: regular dental visits  Gyn Health:    OB History        1    Para   1    Term   1            AB        Living   1       SAB        IAB        Ectopic        Multiple        Live Births   1           Obstetric Comments    1              No LMP recorded  Patient has had an implant  History of abnormal pap smears  Yes history of CIN3 in , s/p LEEP     Birth control: Pratibha Huffman IUD placed 2023    Histories Updated and Reviewed 2023:  Patient's Medications   New Prescriptions    No medications on file   Previous Medications    CARBAMIDE PEROXIDE (DEBROX) 6 5 % OTIC SOLUTION        FLUTICASONE (FLONASE) 50 MCG/ACT NASAL SPRAY    1 spray into each nostril daily    LORATADINE (ALAVERT) 10 MG DISSOLVABLE TABLET        TRIAMCINOLONE (KENALOG) 0 1 % CREAM    Apply topically 2 (two) times a day   Modified Medications    No medications on file   Discontinued Medications    No medications on file     No Known Allergies  Past Medical History:   Diagnosis Date    Abnormal Pap smear of cervix     Allergic rhinitis     last assessed-2012    Anemia     Chronic pain syndrome 2020    SANDEEP III (cervical intraepithelial neoplasia grade III) with severe dysplasia     LEEP cone biopsy for SANDEEP-3 04    Done at the hospital    Gestational diabetes     antepartum condition or prior complicated delivery    Impacted cerumen of both ears last assessed-6/13/2012    Low back pain     Oligomenorrhea     Spinal stenosis     Spinal stenosis of lumbar region without neurogenic claudication 6/14/2019    Vaginal candidiasis     last assessed-11/20/2013    Varicella     chickenpox as a child    Vertigo     last assessed-8/1/2013     Social History     Socioeconomic History    Marital status: /Civil Union     Spouse name: Not on file    Number of children: 1    Years of education: Not on file    Highest education level: Not on file   Occupational History    Not on file   Tobacco Use    Smoking status: Never    Smokeless tobacco: Never   Vaping Use    Vaping Use: Never used   Substance and Sexual Activity    Alcohol use: Yes     Alcohol/week: 2 0 standard drinks     Types: 2 Glasses of wine per week    Drug use: No    Sexual activity: Yes     Partners: Male     Birth control/protection: I U D     Other Topics Concern    Not on file   Social History Narrative    Caffeine use    Sikhism Affiliation Still ([address] [place] [address])    Uses safety equipment-seatbelts     Social Determinants of Health     Financial Resource Strain: Not on file   Food Insecurity: Not on file   Transportation Needs: Not on file   Physical Activity: Not on file   Stress: Not on file   Social Connections: Not on file   Intimate Partner Violence: Not on file   Housing Stability: Not on file     Immunization History   Administered Date(s) Administered    COVID-19 PFIZER VACCINE 0 3 ML IM 12/22/2020, 01/12/2021, 11/16/2021, 11/16/2021    COVID-19 Pfizer Vac BIVALENT Luis-sucrose 12 Yr+ IM (BOOSTER ONLY) 10/13/2022    INFLUENZA 11/05/2014, 10/15/2015, 01/17/2018, 01/17/2018, 11/17/2018, 11/17/2018, 10/22/2019, 10/22/2019, 10/07/2020, 10/21/2021    Influenza, seasonal, injectable 10/24/2013    Tdap 01/01/2009, 06/14/2019    Tuberculin Skin Test-PPD Intradermal 10/22/2013, 03/04/2014, 08/31/2016       Objective:  /72 (BP Location: Left arm, Patient Position: "Sitting, Cuff Size: Standard)   Pulse 68   Temp 97 9 °F (36 6 °C)   Resp 18   Ht 5' 4\" (1 626 m)   Wt 81 6 kg (180 lb)   SpO2 99%   BMI 30 90 kg/m²   BP Readings from Last 3 Encounters:   05/05/23 120/72   03/24/23 128/82   02/24/23 122/78      Wt Readings from Last 3 Encounters:   05/05/23 81 6 kg (180 lb)   03/24/23 81 1 kg (178 lb 12 8 oz)   02/24/23 82 6 kg (182 lb 3 2 oz)      Physical Exam  Constitutional:       General: She is not in acute distress  Appearance: Normal appearance  She is not ill-appearing or toxic-appearing  HENT:      Head: Normocephalic and atraumatic  Right Ear: External ear normal  There is impacted cerumen  Left Ear: External ear normal  There is impacted cerumen  Nose: Nose normal       Mouth/Throat:      Mouth: Mucous membranes are moist    Eyes:      Extraocular Movements: Extraocular movements intact  Conjunctiva/sclera: Conjunctivae normal    Cardiovascular:      Rate and Rhythm: Normal rate and regular rhythm  Pulses: Normal pulses  Heart sounds: Normal heart sounds  No murmur heard  No friction rub  No gallop  Pulmonary:      Effort: Pulmonary effort is normal  No respiratory distress  Breath sounds: Normal breath sounds  No stridor  No wheezing, rhonchi or rales  Abdominal:      General: There is no distension  Palpations: Abdomen is soft  Tenderness: There is no abdominal tenderness  There is no guarding or rebound  Musculoskeletal:         General: Normal range of motion  Cervical back: Normal range of motion and neck supple  No rigidity  Right lower leg: No edema  Left lower leg: No edema  Skin:     Findings: No erythema or rash  Neurological:      General: No focal deficit present  Mental Status: She is alert and oriented to person, place, and time     Psychiatric:         Mood and Affect: Mood normal          Behavior: Behavior normal          Patient Care Team:  Edda Marquez MD as " "PCP - General (Family Medicine)  MD Emily Crawford MD    Note: Portions of the record may have been created with voice recognition software  Occasional wrong word or \"sound a like\" substitutions may have occurred due to the inherent limitations of voice recognition software  Read the chart carefully and recognize, using context, where substitutions have occurred    "

## 2023-05-05 ENCOUNTER — ANNUAL EXAM (OUTPATIENT)
Dept: GYNECOLOGY | Facility: CLINIC | Age: 43
End: 2023-05-05

## 2023-05-05 ENCOUNTER — OFFICE VISIT (OUTPATIENT)
Dept: FAMILY MEDICINE CLINIC | Facility: CLINIC | Age: 43
End: 2023-05-05

## 2023-05-05 VITALS
SYSTOLIC BLOOD PRESSURE: 116 MMHG | WEIGHT: 180.2 LBS | BODY MASS INDEX: 30.77 KG/M2 | DIASTOLIC BLOOD PRESSURE: 68 MMHG | HEIGHT: 64 IN

## 2023-05-05 VITALS
OXYGEN SATURATION: 99 % | HEART RATE: 68 BPM | RESPIRATION RATE: 18 BRPM | DIASTOLIC BLOOD PRESSURE: 72 MMHG | TEMPERATURE: 97.9 F | BODY MASS INDEX: 30.73 KG/M2 | HEIGHT: 64 IN | SYSTOLIC BLOOD PRESSURE: 120 MMHG | WEIGHT: 180 LBS

## 2023-05-05 DIAGNOSIS — R92.2 DENSE BREAST TISSUE ON MAMMOGRAM: ICD-10-CM

## 2023-05-05 DIAGNOSIS — Z00.00 ANNUAL PHYSICAL EXAM: Primary | ICD-10-CM

## 2023-05-05 DIAGNOSIS — Z12.31 ENCOUNTER FOR SCREENING MAMMOGRAM FOR BREAST CANCER: ICD-10-CM

## 2023-05-05 DIAGNOSIS — Z97.5 IUD CONTRACEPTION: ICD-10-CM

## 2023-05-05 DIAGNOSIS — Z01.419 WOMEN'S ANNUAL ROUTINE GYNECOLOGICAL EXAMINATION: Primary | ICD-10-CM

## 2023-05-05 DIAGNOSIS — N91.4 SECONDARY OLIGOMENORRHEA: ICD-10-CM

## 2023-05-05 DIAGNOSIS — Z11.51 SCREENING FOR HUMAN PAPILLOMAVIRUS (HPV): ICD-10-CM

## 2023-05-05 NOTE — PROGRESS NOTES
Assessment/Plan   Diagnoses and all orders for this visit:    Women's annual routine gynecological examination    Encounter for screening mammogram for breast cancer  -     Mammo screening bilateral w 3d & cad; Future    IUD contraception    Secondary oligomenorrhea    1  yearly exam- Pap smear done with HPV testing, self breast awareness reviewed, calcium/vitamin D recommendations discussed, mammogram request given  2  Tamika Leal IUD- inserted 2/24/2023  It is in good position on exam, IUD string seen at a length of 2 5 to 3 cm  After light bleeding after insertion, she has noted oligomenorrhea which she did note on the last Tamika Corner  To call return with issues  She is status post Suzanne/Kyleena/now second Tamika Corner  3  history of secondary oligomenorrhea- related to Tamika Corner  To call with any issues  4  history of severe dysplasia-status post LEEP cone biopsy 2004 with negative testing since then  Pap with HPV was last on 4/12/2021  Pap with HPV done today, disposition as per findings  5  dense breast tissue- had mammogram July 2022 with dense changes, had 3D mammogram and ABUS in 2021  Request for each was given  She may consider going to do 3D mammogram and ABUS together ally 2023 when she is due  6   Other-  with multiple with HPV on his penis in the distant past   Pap with HPV done today  Follow-up 1 year for yearly exam or as needed  Subjective   Patient ID: Damion Anand is a 39 y o  female  Vitals:    05/05/23 1242   BP: 116/68     Patient was seen today for yearly exam   Please see assessment plan for details        The following portions of the patient's history were reviewed and updated as appropriate: allergies, current medications, past family history, past medical history, past social history, past surgical history and problem list   Past Medical History:   Diagnosis Date    Abnormal Pap smear of cervix     Allergic rhinitis     last assessed-12/31/2012    Anemia     Chronic pain syndrome 2020    SANDEEP III (cervical intraepithelial neoplasia grade III) with severe dysplasia     LEEP cone biopsy for SANDEEP-3 04  Done at the hospital    Gestational diabetes     antepartum condition or prior complicated delivery    Impacted cerumen of both ears     last assessed-2012    Low back pain     Oligomenorrhea     Spinal stenosis     Spinal stenosis of lumbar region without neurogenic claudication 2019    Vaginal candidiasis     last assessed-2013    Varicella     chickenpox as a child    Vertigo     last assessed-2013     Past Surgical History:   Procedure Laterality Date    CERVICAL BIOPSY  W/ LOOP ELECTRODE EXCISION  2004    Pathololgy with SANDEEP-2 to 3, with free margins but dysplasia extended to within 1mm of endocervical margin  2004     SECTION, LOW TRANSVERSE  2009    Primary low transverse  for arrest in the active phase at 4 cm with unengaged vertex despite hours of labor    COLPOSCOPY W/ BIOPSY / CURETTAGE  2003    EPIDURAL BLOCK INJECTION N/A 2020    Procedure: L4-L5 LUMBAR EPIDURAL STEROID INJECTION (23580);   Surgeon: Love Jarrell MD;  Location: [facility] MAIN OR;  Service: Pain Management     FOOT SURGERY Right     TOOTH EXTRACTION       OB History    Para Term  AB Living   1 1 1     1   SAB IAB Ectopic Multiple Live Births           1      # Outcome Date GA Lbr Gregory/2nd Weight Sex Delivery Anes PTL Lv   1 Term               Obstetric Comments    1       Current Outpatient Medications:     fluticasone (FLONASE) 50 mcg/act nasal spray, 1 spray into each nostril daily, Disp: , Rfl:     loratadine (Alavert) 10 MG dissolvable tablet, , Disp: , Rfl:     triamcinolone (KENALOG) 0 1 % cream, Apply topically 2 (two) times a day, Disp: 30 g, Rfl: 0    carbamide peroxide (Debrox) 6 5 % otic solution, , Disp: , Rfl:   No Known Allergies  Social History     Socioeconomic History    Marital status: /Civil Union     Spouse name: None    Number of children: 1    Years of education: None    Highest education level: None   Occupational History    None   Tobacco Use    Smoking status: Never    Smokeless tobacco: Never   Vaping Use    Vaping Use: Never used   Substance and Sexual Activity    Alcohol use: Yes     Alcohol/week: 2 0 standard drinks     Types: 2 Glasses of wine per week    Drug use: No    Sexual activity: Yes     Partners: Male     Birth control/protection: I U D  Other Topics Concern    None   Social History Narrative    Caffeine use    Orthodoxy Affiliation [employer] Wiggins)    Uses safety equipment-seatbelts     Social Determinants of Health     Financial Resource Strain: Not on file   Food Insecurity: Not on file   Transportation Needs: Not on file   Physical Activity: Not on file   Stress: Not on file   Social Connections: Not on file   Intimate Partner Violence: Not on file   Housing Stability: Not on file     Family History   Problem Relation Age of Onset    Skin cancer Mother     Hypertension Father     Kidney failure Father     Hypertension Maternal Grandmother     Lung cancer Maternal Grandmother     Cancer Maternal Grandfather         bladder    Hypertension Maternal Grandfather     Lung cancer Paternal Grandmother     Heart disease Paternal Grandfather     No Known Problems Brother     No Known Problems Son     No Known Problems Maternal Aunt     No Known Problems Maternal Aunt     No Known Problems Maternal Uncle     No Known Problems Paternal Aunt     No Known Problems Paternal Uncle        Review of Systems   Constitutional: Negative for chills, diaphoresis, fatigue and fever  Respiratory: Negative for apnea, cough, chest tightness, shortness of breath and wheezing  Cardiovascular: Negative for chest pain, palpitations and leg swelling     Gastrointestinal: Negative for abdominal distention, abdominal pain, anal bleeding, "constipation, diarrhea, nausea, rectal pain and vomiting  Genitourinary: Negative for difficulty urinating, dyspareunia, dysuria, frequency, hematuria, menstrual problem, pelvic pain, urgency, vaginal bleeding, vaginal discharge and vaginal pain  Musculoskeletal: Negative for arthralgias, back pain and myalgias  Skin: Negative for color change and rash  Neurological: Negative for dizziness, syncope, light-headedness, numbness and headaches  Hematological: Negative for adenopathy  Does not bruise/bleed easily  Psychiatric/Behavioral: Negative for dysphoric mood and sleep disturbance  The patient is not nervous/anxious  Objective   Physical Exam  OBGyn Exam     Objective      /68 (BP Location: Right arm, Patient Position: Sitting)   Ht 5' 4\" (1 626 m)   Wt 81 7 kg (180 lb 3 2 oz)   BMI 30 93 kg/m²     General:   alert and oriented, in no acute distress   Neck: normal to inspection and palpation   Breast: normal appearance, no masses or tenderness   Heart:    Lungs:    Abdomen: soft, non-tender, without masses or organomegaly   Vulva: normal   Vagina: Without erythema or lesions or discharge  Normal   Cervix: Without lesions or discharge or cervicitis  No Cervical motion tenderness  IUD string noted at 2 5 to 3 cm     Uterus: top normal size, anteverted, non-tender   Adnexa: no mass, fullness, tenderness   Rectum: negative    Psych:  Normal mood and affect   Skin:  Without obvious lesions   Eyes: symmetric, with normal movements and reactivity   Musculoskeletal:  Normal muscle tone and movements appreciated       "

## 2023-05-10 ENCOUNTER — TELEMEDICINE (OUTPATIENT)
Dept: PSYCHIATRY | Facility: CLINIC | Age: 43
End: 2023-05-10

## 2023-05-10 DIAGNOSIS — F43.20 ADJUSTMENT DISORDER, UNSPECIFIED TYPE: Primary | ICD-10-CM

## 2023-05-10 NOTE — PSYCH
"Behavioral Health Psychotherapy Progress Note    Psychotherapy Provided: Individual Psychotherapy     1  Adjustment disorder, unspecified type            Goals addressed in session: Goal 1     DATA: Karine took an aerobics class and her knees were hurting  Karine shared she had a good weekend and went to a graduation  She shared Saturday was difficult because Angeline Lozada had his laser tag party and she was late because of some traffic  She talked about her feelings about raising her son and that he is  from her in growing up  She said her son's attitude triggers her insecurities at times  Therapist led Karine through a self love meditation and she said it was difficult to feel her feelings and felt \"icky  \"  Therapist asked why it felt \"icky\" to her  She said it felt vulnerable and exposed and raw  Therapist asked how her feelings were received as a kid and she said they were disregarded  She said she felt she had to have her \"shit together\" as a child and still feels that way now  She said when she was a kid and her mom came home angry she had to be prepared for it and stay in her room or apologize  She said she had anxiety and anticipation in her childhood and didn't feel emotionally safe, and that she does feel safe now to talk about her feelings  Therapist encouraged Karine to reach out to friends to talk about her feelings  She said she might do so  During this session, this clinician used the following therapeutic modalities: Supportive Psychotherapy    Substance Abuse was not addressed during this session  If the client is diagnosed with a co-occurring substance use disorder, please indicate any changes in the frequency or amount of use:   Stage of change for addressing substance use diagnoses: No substance use/Not applicable    ASSESSMENT:  Karine Dan presents with a Euthymic/ normal mood       her affect is Normal range and intensity, which is congruent, with her mood and the content of " Problem: Patient Care Overview  Goal: Plan of Care Review  Outcome: Ongoing (interventions implemented as appropriate)  Pt stable overnight, VSS, afebrile. Pt sleeping comfortably between care, tolerating regular diet, good UOP. L foot PIV remains patent, SL, receiving cefepime q8h, transitioned to rocephin x1 dose today. No prn meds required. Parents remain at bedside, attentive and appropriate, aware of plan of care.       "the session  The client has made progress on their goals  Karine Dan presents with a none risk of suicide, none risk of self-harm, and none risk of harm to others  For any risk assessment that surpasses a \"low\" rating, a safety plan must be developed  A safety plan was indicated: no  If yes, describe in detail     PLAN: Between sessions, Karine Dan will utilize mindfulness, reach out to friends about her feelings  At the next session, the therapist will use Mindfulness-based Strategies and Supportive Psychotherapy to address interpersonal interactions, triggers for past feelings  Behavioral Health Treatment Plan and Discharge Planning: Annie Godwin is aware of and agrees to continue to work on their treatment plan  They have identified and are working toward their discharge goals  yes    Visit start and stop times:    05/10/23  Start Time: 1800  Stop Time: 1850  Total Visit Time: 50 minutes    Virtual Regular Visit    Verification of patient location:    Patient is located at Home in the following state in which I hold an active license [place]      Assessment/Plan:    Problem List Items Addressed This Visit        Other    Adjustment disorder - Primary       Goals addressed in session: Goal 1          Reason for visit is No chief complaint on file         Encounter provider Daniel Ortiz LCSW    Provider located at [address] [place] [id number] [phone]      Recent Visits  Date Type Provider Dept   05/05/23 Office Visit MD Prince Farrell   05/03/23 Telemedicine Cherie Fabian LCSW [facility]   Showing recent visits within past 7 days and meeting all other requirements  Today's Visits  Date Type Provider Dept   05/10/23 Telemedicine Daniel Ortiz LCSW [facility]   Showing today's visits and meeting all other " requirements  Future Appointments  No visits were found meeting these conditions  Showing future appointments within next 150 days and meeting all other requirements       The patient was identified by name and date of birth  Karinechantel Dan was informed that this is a telemedicine visit and that the visit is being conducted throughthe Vis[employer] platform  She agrees to proceed     My office door was closed  No one else was in the room  She acknowledged consent and understanding of privacy and security of the video platform  The patient has agreed to participate and understands they can discontinue the visit at any time  Patient is aware this is a billable service  Subjective  Karine Herzog is a 39 y o  female  Karine appeared calm and cooperative and was able to openly communicate thoughts and feelings during session  Karine did a mindfulness meditation and said that it was good for her to focus on positive feelings  HPI     Past Medical History:   Diagnosis Date   • Abnormal Pap smear of cervix    • Allergic rhinitis     last assessed-2012   • Anemia    • Chronic pain syndrome 2020   • SANDEEP III (cervical intraepithelial neoplasia grade III) with severe dysplasia     LEEP cone biopsy for SANDEEP-3 04  Done at the hospital   • Gestational diabetes     antepartum condition or prior complicated delivery   • Impacted cerumen of both ears     last assessed-2012   • Low back pain    • Oligomenorrhea    • Spinal stenosis    • Spinal stenosis of lumbar region without neurogenic claudication 2019   • Vaginal candidiasis     last assessed-2013   • Varicella     chickenpox as a child   • Vertigo     last assessed-2013       Past Surgical History:   Procedure Laterality Date   • CERVICAL BIOPSY  W/ LOOP ELECTRODE EXCISION  2004    Pathololgy with SANDEEP-2 to 3, with free margins but dysplasia extended to within 1mm of endocervical margin    2004   •  SECTION, LOW TRANSVERSE  2009    Primary low transverse  for arrest in the active phase at 4 cm with unengaged vertex despite hours of labor   • COLPOSCOPY W/ BIOPSY / CURETTAGE  2003   • EPIDURAL BLOCK INJECTION N/A 2020    Procedure: L4-L5 LUMBAR EPIDURAL STEROID INJECTION (55693); Surgeon: Himanshu Gordon MD;  Location[facility] MAIN OR;  Service: Pain Management    • FOOT SURGERY Right    • TOOTH EXTRACTION         Current Outpatient Medications   Medication Sig Dispense Refill   • carbamide peroxide (Debrox) 6 5 % otic solution  (Patient not taking: Reported on 3/24/2023)     • fluticasone (FLONASE) 50 mcg/act nasal spray 1 spray into each nostril daily     • loratadine (Alavert) 10 MG dissolvable tablet      • triamcinolone (KENALOG) 0 1 % cream Apply topically 2 (two) times a day 30 g 0     No current facility-administered medications for this visit  No Known Allergies    Review of Systems    Video Exam    There were no vitals filed for this visit      Physical Exam

## 2023-05-11 LAB
LAB AP GYN PRIMARY INTERPRETATION: NORMAL
Lab: NORMAL

## 2023-05-17 ENCOUNTER — TELEMEDICINE (OUTPATIENT)
Dept: PSYCHIATRY | Facility: CLINIC | Age: 43
End: 2023-05-17

## 2023-05-17 DIAGNOSIS — F43.20 ADJUSTMENT DISORDER, UNSPECIFIED TYPE: Primary | ICD-10-CM

## 2023-05-17 NOTE — PSYCH
Behavioral Health Psychotherapy Progress Note    Psychotherapy Provided: Individual Psychotherapy     1  Adjustment disorder, unspecified type            Goals addressed in session: Goal 1     DATA: Karine shared she is doing well and reading some new books that are enjoyable for her  She shared she was thinking of some specific things from her childhood when she was not safe to express or have her feelings or thoughts  Therapist gave some education about what EMDR is and suggested targeting some memories or issues from her past to move this  She said she would like to do this  She talked about dealing with sad feelings as a mom that her son is becoming his own person and drawing boundaries with her  Karine and therapist continued timeline from 26 with some postpartum depression  She talked about having a good marriage and having some issues with an emotional affair and still holds guilt over it  She said she started feeling better in the marriage after awhile and to be more connected  Therapist taught Karine calm place and she was able to envision [facility] where she was looking at the ocean while sitting on a porch at [employer]  Karine also identified bilateral stimulation she liked as tapping on her thighs  During this session, this clinician used the following therapeutic modalities: EMDR (or other form of bilateral Stimulation)    Substance Abuse was not addressed during this session  If the client is diagnosed with a co-occurring substance use disorder, please indicate any changes in the frequency or amount of use:   Stage of change for addressing substance use diagnoses: No substance use/Not applicable    ASSESSMENT:  Karine Dan presents with a Euthymic/ normal mood  her affect is Normal range and intensity, which is congruent, with her mood and the content of the session  The client has made progress on their goals       Karine Dan presents with a none risk of suicide, none risk of "self-harm, and none risk of harm to others  For any risk assessment that surpasses a \"low\" rating, a safety plan must be developed  A safety plan was indicated: no  If yes, describe in detail     PLAN: Between sessions, Karine Dan will practice Calm Place  At the next session, the therapist will use EMDR (or other form of bilateral Stimulation) to address past events, interpersonal interactions  Behavioral Health Treatment Plan and Discharge Planning: Bob Zamarripa is aware of and agrees to continue to work on their treatment plan  They have identified and are working toward their discharge goals  yes    Visit start and stop times:    05/17/23  Start Time: 1800  Stop Time: 1850  Total Visit Time: 50 minutes    Virtual Regular Visit    Verification of patient location:    Patient is located at Home in the following state in which I hold an active license [place]      Assessment/Plan:    Problem List Items Addressed This Visit        Other    Adjustment disorder - Primary       Goals addressed in session: Goal 1          Reason for visit is No chief complaint on file  Encounter provider Marilynn Baez LCSW    Provider located at [address] [place] [id number] [phone]      Recent Visits  Date Type Provider Dept   05/10/23 Telemedicine Marilynn Baez LCSW [facility]   Showing recent visits within past 7 days and meeting all other requirements  Today's Visits  Date Type Provider Dept   05/17/23 Telemedicine Marilynn Baez LCSW [facility]   Showing today's visits and meeting all other requirements  Future Appointments  No visits were found meeting these conditions  Showing future appointments within next 150 days and meeting all other requirements       The patient was identified by name and date of birth   Karine Dan was informed that " this is a telemedicine visit and that the visit is being conducted throughthe Adm[employer] platform  She agrees to proceed     My office door was closed  No one else was in the room  She acknowledged consent and understanding of privacy and security of the video platform  The patient has agreed to participate and understands they can discontinue the visit at any time  Patient is aware this is a billable service  Subjective  Karine Dan is a 39 y o  female Karine appeared calm and cooperative and was able to learn Calm Place as well as talk about her timeline   HPI     Past Medical History:   Diagnosis Date   • Abnormal Pap smear of cervix    • Allergic rhinitis     last assessed-2012   • Anemia    • Chronic pain syndrome 2020   • SANDEEP III (cervical intraepithelial neoplasia grade III) with severe dysplasia     LEEP cone biopsy for SANDEEP-3 04  Done at the hospital   • Gestational diabetes     antepartum condition or prior complicated delivery   • Impacted cerumen of both ears     last assessed-2012   • Low back pain    • Oligomenorrhea    • Spinal stenosis    • Spinal stenosis of lumbar region without neurogenic claudication 2019   • Vaginal candidiasis     last assessed-2013   • Varicella     chickenpox as a child   • Vertigo     last assessed-2013       Past Surgical History:   Procedure Laterality Date   • CERVICAL BIOPSY  W/ LOOP ELECTRODE EXCISION  2004    Pathololgy with SANDEEP-2 to 3, with free margins but dysplasia extended to within 1mm of endocervical margin  2004   •  SECTION, LOW TRANSVERSE  2009    Primary low transverse  for arrest in the active phase at 4 cm with unengaged vertex despite hours of labor   • COLPOSCOPY W/ BIOPSY / CURETTAGE  2003   • EPIDURAL BLOCK INJECTION N/A 2020    Procedure: L4-L5 LUMBAR EPIDURAL STEROID INJECTION (69218);   Surgeon: Eun Frost MD;  Location: [facility] MAIN OR;  Service: Pain Management    • FOOT SURGERY Right    • TOOTH EXTRACTION         Current Outpatient Medications   Medication Sig Dispense Refill   • carbamide peroxide (Debrox) 6 5 % otic solution  (Patient not taking: Reported on 3/24/2023)     • fluticasone (FLONASE) 50 mcg/act nasal spray 1 spray into each nostril daily     • loratadine (Alavert) 10 MG dissolvable tablet      • triamcinolone (KENALOG) 0 1 % cream Apply topically 2 (two) times a day 30 g 0     No current facility-administered medications for this visit  No Known Allergies    Review of Systems    Video Exam    There were no vitals filed for this visit      Physical Exam

## 2023-05-24 ENCOUNTER — TELEMEDICINE (OUTPATIENT)
Dept: PSYCHIATRY | Facility: CLINIC | Age: 43
End: 2023-05-24

## 2023-05-24 DIAGNOSIS — F43.20 ADJUSTMENT DISORDER, UNSPECIFIED TYPE: Primary | ICD-10-CM

## 2023-05-24 NOTE — PSYCH
"Behavioral Health Psychotherapy Progress Note    Psychotherapy Provided: Individual Psychotherapy     1  Adjustment disorder, unspecified type            Goals addressed in session: Goal 1     DATA:   Karine shared she was feeling \"sleepy\" and had done a workout today  She shared it's been a weird day with highs and lows with work  She shared she gets to go to a conference this year in [place] for weight loss trainings  She shared she catasrophizes about flying fearing crashing  Karine talked about challenging her own cognitive distortions  Therapist encouraged her to think about distractions she can have to take her mind off of the flight  Therapist taught Karine container exercise and she chose [employer] magic bag and practiced putting situations and feelings in there  She was able to put things in the bag  She also said she practiced [facility] calm place successfully  Therapist and Loja Notice identified a memory of her parents  and her feelings of being let down, feeling sad and having a MARIAH level of 8  Therapist and Karine completed the session with container and heart breathing for grounding  During this session, this clinician used the following therapeutic modalities: EMDR (or other form of bilateral Stimulation)    Substance Abuse was not addressed during this session  If the client is diagnosed with a co-occurring substance use disorder, please indicate any changes in the frequency or amount of use:   Stage of change for addressing substance use diagnoses: No substance use/Not applicable    ASSESSMENT:  Karine Dan presents with a Euthymic/ normal mood  her affect is Normal range and intensity, which is congruent, with her mood and the content of the session  The client has made progress on their goals  Karine Dan presents with a none risk of suicide, none risk of self-harm, and none risk of harm to others      For any risk assessment that surpasses a \"low\" rating, a " safety plan must be developed  A safety plan was indicated: no  If yes, describe in detail     PLAN: Between sessions, Karine Dan will practice container and calm place  At the next session, the therapist will use EMDR (or other form of bilateral Stimulation) to address past trauma    [address] and Discharge Planning: Gagandeep Silva is aware of and agrees to continue to work on their treatment plan  They have identified and are working toward their discharge goals  yes    Visit start and stop times:    05/24/23  Start Time: 1800  Stop Time: 1851  Total Visit Time: 51 minutes    Virtual Regular Visit    Verification of patient location:    Patient is located at Home in the following state in which I hold an active license [place]      Assessment/Plan:    Problem List Items Addressed This Visit        Other    Adjustment disorder - Primary       Goals addressed in session: Goal 1          Reason for visit is No chief complaint on file  Encounter provider Margoth Ayala LCSW    Provider located at [address] [place] [id number] [phone]      Recent Visits  Date Type Provider Dept   05/17/23 Telemedicine Margoth Ayala LCSW [facility]   Showing recent visits within past 7 days and meeting all other requirements  Today's Visits  Date Type Provider Dept   05/24/23 Telemedicine Margoth Ayala LCSW [facility]   Showing today's visits and meeting all other requirements  Future Appointments  No visits were found meeting these conditions  Showing future appointments within next 150 days and meeting all other requirements       The patient was identified by name and date of birth  Karine Dan was informed that this is a telemedicine visit and that the visit is being conducted throughthe [employer] platform   She agrees to proceed     My office door was closed  No one else was in the room  She acknowledged consent and understanding of privacy and security of the video platform  The patient has agreed to participate and understands they can discontinue the visit at any time  Patient is aware this is a billable service  Subjective  Karine Dan is a 39 y o  female Karine appeared calm and cooperative and was able to engage in EMDR therapy, learning grounding skills and identifying a past memory to process in the next session  Karine talked about it being relevant to her current feelings and job situation to process her feelings and thoughts about her past        HPI     Past Medical History:   Diagnosis Date   • Abnormal Pap smear of cervix    • Allergic rhinitis     last assessed-2012   • Anemia    • Chronic pain syndrome 2020   • SANDEEP III (cervical intraepithelial neoplasia grade III) with severe dysplasia     LEEP cone biopsy for SANDEPE-3 04  Done at the hospital   • Gestational diabetes     antepartum condition or prior complicated delivery   • Impacted cerumen of both ears     last assessed-2012   • Low back pain    • Oligomenorrhea    • Spinal stenosis    • Spinal stenosis of lumbar region without neurogenic claudication 2019   • Vaginal candidiasis     last assessed-2013   • Varicella     chickenpox as a child   • Vertigo     last assessed-2013       Past Surgical History:   Procedure Laterality Date   • CERVICAL BIOPSY  W/ LOOP ELECTRODE EXCISION  2004    Pathololgy with SANDEEP-2 to 3, with free margins but dysplasia extended to within 1mm of endocervical margin    2004   •  SECTION, LOW TRANSVERSE  2009    Primary low transverse  for arrest in the active phase at 4 cm with unengaged vertex despite hours of labor   • COLPOSCOPY W/ BIOPSY / CURETTAGE  2003   • EPIDURAL BLOCK INJECTION N/A 2020    Procedure: L4-L5 LUMBAR EPIDURAL STEROID INJECTION (26301); Surgeon: Sho Joy MD;  Location: [facility] MAIN OR;  Service: Pain Management    • FOOT SURGERY Right    • TOOTH EXTRACTION         Current Outpatient Medications   Medication Sig Dispense Refill   • carbamide peroxide (Debrox) 6 5 % otic solution  (Patient not taking: Reported on 3/24/2023)     • fluticasone (FLONASE) 50 mcg/act nasal spray 1 spray into each nostril daily     • loratadine (Alavert) 10 MG dissolvable tablet      • triamcinolone (KENALOG) 0 1 % cream Apply topically 2 (two) times a day 30 g 0     No current facility-administered medications for this visit  No Known Allergies    Review of Systems    Video Exam    There were no vitals filed for this visit      Physical Exam

## 2023-05-31 ENCOUNTER — TELEMEDICINE (OUTPATIENT)
Dept: PSYCHIATRY | Facility: CLINIC | Age: 43
End: 2023-05-31

## 2023-05-31 DIAGNOSIS — F43.20 ADJUSTMENT DISORDER, UNSPECIFIED TYPE: Primary | ICD-10-CM

## 2023-06-14 ENCOUNTER — TELEMEDICINE (OUTPATIENT)
Dept: PSYCHIATRY | Facility: CLINIC | Age: 43
End: 2023-06-14
Payer: COMMERCIAL

## 2023-06-14 DIAGNOSIS — F43.20 ADJUSTMENT DISORDER, UNSPECIFIED TYPE: Primary | ICD-10-CM

## 2023-06-14 PROCEDURE — 90834 PSYTX W PT 45 MINUTES: CPT

## 2023-06-14 NOTE — PSYCH
"Behavioral Health Psychotherapy Progress Note    Psychotherapy Provided: Individual Psychotherapy     1  Adjustment disorder, unspecified type            Goals addressed in session: Goal 1     DATA: Karine shared she has been doing well but her son got a fever Sunday and went to the ER  She shared she is thinking negative at times about bad things happening but has been able to counter her negative thinking  Karine shared she struggles with letting go of her son with him growing up  She also shared she has been trying to open up to friends lately and she is enjoying this  Therapist asked for feedback about EMDR and she said it was valuable and she wanted to continue this processing  Karine talked about some work stress she is experiencing at the end of the session  During this session, this clinician used the following therapeutic modalities: Supportive Psychotherapy    Substance Abuse was not addressed during this session  If the client is diagnosed with a co-occurring substance use disorder, please indicate any changes in the frequency or amount of use:   Stage of change for addressing substance use diagnoses: No substance use/Not applicable    ASSESSMENT:  Karine Dan presents with a Euthymic/ normal mood  her affect is Normal range and intensity, which is congruent, with her mood and the content of the session  The client has made progress on their goals  Karine Dan presents with a none risk of suicide, none risk of self-harm, and none risk of harm to others  For any risk assessment that surpasses a \"low\" rating, a safety plan must be developed  A safety plan was indicated: no  If yes, describe in detail     PLAN: Between sessions, Karine Dan will utilize container and calm place if needed  At the next session, the therapist will use EMDR (or other form of bilateral Stimulation) to address past trauma      Behavioral Health Treatment Plan and Discharge Planning: Karine Dan " is aware of and agrees to continue to work on their treatment plan  They have identified and are working toward their discharge goals  yes    Visit start and stop times:    06/14/23  Start Time: 1800  Stop Time: 1850  Total Visit Time: 50 minutes    Virtual Regular Visit    Verification of patient location:    Patient is located at Home in the following state in which I hold an active license [place]      Assessment/Plan:    Problem List Items Addressed This Visit        Other    Adjustment disorder - Primary       Goals addressed in session: Goal 1          Reason for visit is No chief complaint on file  Encounter provider Dania Bishop LCSW    Provider located at [address] [place] [id number] [phone]      Recent Visits  No visits were found meeting these conditions  Showing recent visits within past 7 days and meeting all other requirements  Today's Visits  Date Type Provider Dept   06/14/23 Telemedicine Dania Bishop LCSW [facility]   Showing today's visits and meeting all other requirements  Future Appointments  No visits were found meeting these conditions  Showing future appointments within next 150 days and meeting all other requirements       The patient was identified by name and date of birth  Karine WRIGHT Cydneychelsy was informed that this is a telemedicine visit and that the visit is being conducted throughthe [employer] platform  She agrees to proceed     My office door was closed  No one else was in the room  She acknowledged consent and understanding of privacy and security of the video platform  The patient has agreed to participate and understands they can discontinue the visit at any time  Patient is aware this is a billable service       Subjective  Karine Christianson is a 39 y o  female Karine appeared calm and cooperative and was able to openly communicate thoughts and feelings during session and process thoughts about her work as well as the EMDR processing started last session   HPI     Past Medical History:   Diagnosis Date   • Abnormal Pap smear of cervix    • Allergic rhinitis     last assessed-2012   • Anemia    • Chronic pain syndrome 2020   • SANDEEP III (cervical intraepithelial neoplasia grade III) with severe dysplasia     LEEP cone biopsy for SANDEEP-3 04  Done at the hospital   • Gestational diabetes     antepartum condition or prior complicated delivery   • Impacted cerumen of both ears     last assessed-2012   • Low back pain    • Oligomenorrhea    • Spinal stenosis    • Spinal stenosis of lumbar region without neurogenic claudication 2019   • Vaginal candidiasis     last assessed-2013   • Varicella     chickenpox as a child   • Vertigo     last assessed-2013       Past Surgical History:   Procedure Laterality Date   • CERVICAL BIOPSY  W/ LOOP ELECTRODE EXCISION  2004    Pathololgy with SANDEEP-2 to 3, with free margins but dysplasia extended to within 1mm of endocervical margin  2004   •  SECTION, LOW TRANSVERSE  2009    Primary low transverse  for arrest in the active phase at 4 cm with unengaged vertex despite hours of labor   • COLPOSCOPY W/ BIOPSY / CURETTAGE  2003   • EPIDURAL BLOCK INJECTION N/A 2020    Procedure: L4-L5 LUMBAR EPIDURAL STEROID INJECTION (36123);   Surgeon: Kallie Spears MD;  Location: [facility] MAIN OR;  Service: Pain Management    • FOOT SURGERY Right    • TOOTH EXTRACTION         Current Outpatient Medications   Medication Sig Dispense Refill   • carbamide peroxide (Debrox) 6 5 % otic solution  (Patient not taking: Reported on 3/24/2023)     • fluticasone (FLONASE) 50 mcg/act nasal spray 1 spray into each nostril daily     • loratadine (Alavert) 10 MG dissolvable tablet      • triamcinolone (KENALOG) 0 1 % cream Apply topically 2 (two) times a day 30 g 0     No current facility-administered medications for this visit  No Known Allergies    Review of Systems    Video Exam    There were no vitals filed for this visit      Physical Exam

## 2023-07-05 ENCOUNTER — TELEMEDICINE (OUTPATIENT)
Dept: PSYCHIATRY | Facility: CLINIC | Age: 43
End: 2023-07-05
Payer: COMMERCIAL

## 2023-07-05 DIAGNOSIS — F43.20 ADJUSTMENT DISORDER, UNSPECIFIED TYPE: Primary | ICD-10-CM

## 2023-07-05 PROCEDURE — 90834 PSYTX W PT 45 MINUTES: CPT

## 2023-07-05 NOTE — PSYCH
Behavioral Health Psychotherapy Progress Note    Psychotherapy Provided: Individual Psychotherapy     1. Adjustment disorder, unspecified type            Goals addressed in session: Goal 1     DATA: Karine shared she had some fantastic trips that were also exhausting. Stephanie Blackburn, her mom and aunt went to [facility] and had fun. She said the [facility] trip brought back some family dynamics. Her mom was "catastrophizing" and saying she wasn't going to see Karine because they were in different areas of the hotel. She shared she felt like a push and pull with her aunt and mom because they looked to her for direction and were dependent on her to make things "go." She shared her mom is becoming more helpless as she gets older. She is not sure why this is but thinks it could be her way of relating to her but she wasn't sure. She talked about feeling the loss of her son wanting to be with his friends and not so much around her. She said her  Linda Stahl was good on the trip and was relaxed and this was helpful for her. Therapist asked about what she feels she would still like to work on in therapy and she said she would like to process family dynamics as well as processing EMDR target of feeling like her feelings don't matter. During this session, this clinician used the following therapeutic modalities: EMDR (or other form of bilateral Stimulation)    Substance Abuse was not addressed during this session. If the client is diagnosed with a co-occurring substance use disorder, please indicate any changes in the frequency or amount of use:. Stage of change for addressing substance use diagnoses: No substance use/Not applicable    ASSESSMENT:  Kraine Dan presents with a Euthymic/ normal mood. her affect is Normal range and intensity, which is congruent, with her mood and the content of the session. The client has made progress on their goals.      Karine Dan presents with a none risk of suicide, none risk of self-harm, and none risk of harm to others. For any risk assessment that surpasses a "low" rating, a safety plan must be developed. A safety plan was indicated: If yes, describe in detail     PLAN: Between sessions, Karine WRIGHT Sy will utilize container as needed. At the next session, the therapist will use EMDR (or other form of bilateral Stimulation) to address past trauma. Behavioral Health Treatment Plan and Discharge Planning: Triston Fernandez is aware of and agrees to continue to work on their treatment plan. They have identified and are working toward their discharge goals. yes    Visit start and stop times:    07/05/23  Start Time: 1800  Stop Time: 1851  Total Visit Time: 51 minutes    Virtual Regular Visit    Verification of patient location:    Patient is located at Home in the following state in which I hold an active license [place]      Assessment/Plan:    Problem List Items Addressed This Visit        Other    Adjustment disorder - Primary       Goals addressed in session: Goal 1          Reason for visit is No chief complaint on file. [facility]    Provider located at [address] [place] [id number] [phone]      Recent Visits  No visits were found meeting these conditions. Showing recent visits within past 7 days and meeting all other requirements  Today's Visits  Date Type Provider Dept   07/05/23 [facility]medicine [facility]   Showing today's visits and meeting all other requirements  Future Appointments  No visits were found meeting these conditions. Showing future appointments within next 150 days and meeting all other requirements       The patient was identified by name and date of birth.  Karine ADRIANA Sy was informed that this is a telemedicine visit and that the visit is being conducted [facility] platform. She agrees to proceed. .  My office door was closed. No one else was in the room. She acknowledged consent and understanding of privacy and security of the video platform. The patient has agreed to participate and understands they can discontinue the visit at any time. Patient is aware this is a billable service. Subjective  Karine Grande Nurse is a 39 y.o. female  Karine appeared calm and cooperative and was able to process in EMDR therapy some of her past trauma and current related issues. HPI     Past Medical History:   Diagnosis Date   • Abnormal Pap smear of cervix    • Allergic rhinitis     last assessed-2012   • Anemia    • Chronic pain syndrome 2020   • SANDEEP III (cervical intraepithelial neoplasia grade III) with severe dysplasia     LEEP cone biopsy for SANDEEP-3 04. Done at the hospital   • Gestational diabetes     antepartum condition or prior complicated delivery   • Impacted cerumen of both ears     last assessed-2012   • Low back pain    • Oligomenorrhea    • Spinal stenosis    • Spinal stenosis of lumbar region without neurogenic claudication 2019   • Vaginal candidiasis     last assessed-2013   • Varicella     chickenpox as a child   • Vertigo     last assessed-2013       Past Surgical History:   Procedure Laterality Date   • CERVICAL BIOPSY  W/ LOOP ELECTRODE EXCISION  2004    Pathololgy with SANDEEP-2 to 3, with free margins but dysplasia extended to within 1mm of endocervical margin. 2004   •  SECTION, LOW TRANSVERSE  2009    Primary low transverse  for arrest in the active phase at 4 cm with unengaged vertex despite hours of labor   • COLPOSCOPY W/ BIOPSY / CURETTAGE  2003   • EPIDURAL BLOCK INJECTION N/A 2020    Procedure: L4-L5 LUMBAR EPIDURAL STEROID INJECTION (14884);   Surgeon: Tavares Washington MD;  Location: [facility] MAIN OR;  Service: Pain Management    • FOOT SURGERY Right    • TOOTH EXTRACTION         Current Outpatient Medications   Medication Sig Dispense Refill   • carbamide peroxide (Debrox) 6.5 % otic solution  (Patient not taking: Reported on 3/24/2023)     • fluticasone (FLONASE) 50 mcg/act nasal spray 1 spray into each nostril daily     • loratadine (Alavert) 10 MG dissolvable tablet      • triamcinolone (KENALOG) 0.1 % cream Apply topically 2 (two) times a day 30 g 0     No current facility-administered medications for this visit. No Known Allergies    Review of Systems    Video Exam    There were no vitals filed for this visit.     Physical Exam

## 2023-07-12 ENCOUNTER — TELEMEDICINE (OUTPATIENT)
Dept: PSYCHIATRY | Facility: CLINIC | Age: 43
End: 2023-07-12
Payer: COMMERCIAL

## 2023-07-12 DIAGNOSIS — F43.20 ADJUSTMENT DISORDER, UNSPECIFIED TYPE: Primary | ICD-10-CM

## 2023-07-12 PROCEDURE — 90834 PSYTX W PT 45 MINUTES: CPT

## 2023-07-12 NOTE — PSYCH
Behavioral Health Psychotherapy Progress Note    Psychotherapy Provided: Individual Psychotherapy     1. Adjustment disorder, unspecified type            Goals addressed in session: Goal 1     DATA: Karine shared she has been feeling "good" and has had no major ups or downs recently. She said she visited her dad's Gabriela Donnelly and didn't get teary for once. She said she had a good visit but it felt creepy after awhile. Karine identified a target for EMDR of doing some sexual exploration and feeling shame over this. She did some bilateral stimulation to process this. She was able to process this memory and bring VOC to a 6 and her MARIAH to a 7. Therapist encouraged her to embrace self forgiveness and imagine how she would feel if she could do this. She said she would feel lighter and have less resentment towards her brother and her mom. Therapist helped her to install the resource of self forgiveness in her mind and body and she said she felt "lighter and free" and brave that she processed this painful memory. She said she came up with the mantra of "It's not your fault. You don't have to carry this anymore."  During this session, this clinician used the following therapeutic modalities: EMDR (or other form of bilateral Stimulation)    Substance Abuse was addressed during this session. If the client is diagnosed with a co-occurring substance use disorder, please indicate any changes in the frequency or amount of use: . Stage of change for addressing substance use diagnoses: No substance use/Not applicable    ASSESSMENT:  Karine Dan presents with a Euthymic/ normal mood. her affect is Normal range and intensity, which is congruent, with her mood and the content of the session. The client has made progress on their goals. Karine Dan presents with a none risk of suicide, none risk of self-harm, and none risk of harm to others.     For any risk assessment that surpasses a "low" rating, a safety plan must be developed. A safety plan was indicated: no  If yes, describe in detail     PLAN: Between sessions, Karinechantel Dan will repeat self forgiveness mantra. At the next session, the therapist will use EMDR (or other form of bilateral Stimulation) to address past trauma. Behavioral Health Treatment Plan and Discharge Planning: Sarah Linton is aware of and agrees to continue to work on their treatment plan. They have identified and are working toward their discharge goals. yes    Visit start and stop times:    07/12/23  Start Time: Vaughn Drake  Stop Time: [address]  Total Visit Time: 50 minutes    Virtual Regular Visit    Verification of patient location:    Patient is located at Home in the following state in which I hold an active license [place]      Assessment/Plan:    Problem List Items Addressed This Visit        Other    Adjustment disorder - Primary       Goals addressed in session: Goal 1          Reason for visit is No chief complaint on file. Encounter provider Adriana Fink LCSW    Provider located at [address] [place] [id number] [phone]      Recent Visits  Date Type Provider Dept   07/05/23 Telemedicine Adriana Fink LCSW [facility]   Showing recent visits within past 7 days and meeting all other requirements  Today's Visits  Date Type Provider Dept   07/12/23 Telemedicine Adriana Fink LCSW [facility]   Showing today's visits and meeting all other requirements  Future Appointments  No visits were found meeting these conditions. Showing future appointments within next 150 days and meeting all other requirements       The patient was identified by name and date of birth. Karine Dan was informed that this is a telemedicine visit and that the visit is being conducted throughthe [employer] platform. She agrees to proceed. .  My office door was closed. No one else was in the room. She acknowledged consent and understanding of privacy and security of the video platform. The patient has agreed to participate and understands they can discontinue the visit at any time. Patient is aware this is a billable service. Alverto Dan is a 39 y.o. female Karine appeared calm and cooperative and appropriately tearful during EMDR processing. Karine successfully processed feelings associated with trauma memory . HPI     Past Medical History:   Diagnosis Date   • Abnormal Pap smear of cervix    • Allergic rhinitis     last assessed-2012   • Anemia    • Chronic pain syndrome 2020   • SANDEEP III (cervical intraepithelial neoplasia grade III) with severe dysplasia     LEEP cone biopsy for SANDEEP-3 04. Done at the hospital   • Gestational diabetes     antepartum condition or prior complicated delivery   • Impacted cerumen of both ears     last assessed-2012   • Low back pain    • Oligomenorrhea    • Spinal stenosis    • Spinal stenosis of lumbar region without neurogenic claudication 2019   • Vaginal candidiasis     last assessed-2013   • Varicella     chickenpox as a child   • Vertigo     last assessed-2013       Past Surgical History:   Procedure Laterality Date   • CERVICAL BIOPSY  W/ LOOP ELECTRODE EXCISION  2004    Pathololgy with SANDEEP-2 to 3, with free margins but dysplasia extended to within 1mm of endocervical margin. 2004   •  SECTION, LOW TRANSVERSE  2009    Primary low transverse  for arrest in the active phase at 4 cm with unengaged vertex despite hours of labor   • COLPOSCOPY W/ BIOPSY / CURETTAGE  2003   • EPIDURAL BLOCK INJECTION N/A 2020    Procedure: L4-L5 LUMBAR EPIDURAL STEROID INJECTION (12680);   Surgeon: Jeana Thomas MD;  Location: [facility] MAIN OR;  Service: Pain Management    • FOOT SURGERY Right    • TOOTH EXTRACTION         Current Outpatient Medications   Medication Sig Dispense Refill   • carbamide peroxide (Debrox) 6.5 % otic solution  (Patient not taking: Reported on 3/24/2023)     • fluticasone (FLONASE) 50 mcg/act nasal spray 1 spray into each nostril daily     • loratadine (Alavert) 10 MG dissolvable tablet      • triamcinolone (KENALOG) 0.1 % cream Apply topically 2 (two) times a day 30 g 0     No current facility-administered medications for this visit. No Known Allergies    Review of Systems    Video Exam    There were no vitals filed for this visit.     Physical Exam

## 2023-07-13 ENCOUNTER — APPOINTMENT (OUTPATIENT)
Dept: LAB | Facility: CLINIC | Age: 43
End: 2023-07-13
Payer: COMMERCIAL

## 2023-07-13 ENCOUNTER — TRANSCRIBE ORDERS (OUTPATIENT)
Dept: LAB | Facility: CLINIC | Age: 43
End: 2023-07-13

## 2023-07-13 DIAGNOSIS — Z00.8 HEALTH EXAMINATION IN POPULATION SURVEY: Primary | ICD-10-CM

## 2023-07-13 LAB
CHOLEST SERPL-MCNC: 178 MG/DL
EST. AVERAGE GLUCOSE BLD GHB EST-MCNC: 103 MG/DL
HBA1C MFR BLD: 5.2 %
HDLC SERPL-MCNC: 66 MG/DL
LDLC SERPL CALC-MCNC: 100 MG/DL (ref 0–100)
NONHDLC SERPL-MCNC: 112 MG/DL
TRIGL SERPL-MCNC: 60 MG/DL

## 2023-07-13 PROCEDURE — 36415 COLL VENOUS BLD VENIPUNCTURE: CPT

## 2023-07-13 PROCEDURE — 83036 HEMOGLOBIN GLYCOSYLATED A1C: CPT

## 2023-07-13 PROCEDURE — 80061 LIPID PANEL: CPT

## 2023-07-19 ENCOUNTER — TELEMEDICINE (OUTPATIENT)
Dept: PSYCHIATRY | Facility: CLINIC | Age: 43
End: 2023-07-19
Payer: COMMERCIAL

## 2023-07-19 DIAGNOSIS — F43.20 ADJUSTMENT DISORDER, UNSPECIFIED TYPE: Primary | ICD-10-CM

## 2023-07-19 PROCEDURE — 90834 PSYTX W PT 45 MINUTES: CPT

## 2023-07-19 NOTE — PSYCH
Behavioral Health Psychotherapy Progress Note    Psychotherapy Provided: Individual Psychotherapy     1. Adjustment disorder, unspecified type            Goals addressed in session: Goal 1     DATA:   Karine shared she was feeling "good" and "tired" and went to [facility] on Monday. She will go to a [facility]" in September for her mother's long-term. Therapist followed up with the EMDR target from last session asking about any further feelings or thoughts. She said she is proud of herself for sharing in therapy and feels freer and lighter because of the processing she did. She shared she feels she still needs to work on having more confidence in general.  She shared she would like to have more confidence at work. Therapist engaged Savor in resource development of confidence as well as a yoga installation of confidence. She shared she is taking a trip to [facility] with friends in a couple of weeks, and some yoga classes including aeriel and hot yoga. During this session, this clinician used the following therapeutic modalities: EMDR (or other form of bilateral Stimulation)    Substance Abuse was not addressed during this session. If the client is diagnosed with a co-occurring substance use disorder, please indicate any changes in the frequency or amount of use: . Stage of change for addressing substance use diagnoses: No substance use/Not applicable    ASSESSMENT:  Karine Dan presents with a Euthymic/ normal mood. her affect is Normal range and intensity, which is congruent, with her mood and the content of the session. The client has made progress on their goals. Karine Dan presents with a none risk of suicide, none risk of self-harm, and none risk of harm to others. For any risk assessment that surpasses a "low" rating, a safety plan must be developed.     A safety plan was indicated: no  If yes, describe in detail     PLAN: Between sessions, Karine Dan will evoke feeling of confidence and think about therapy goals for future. At the next session, the therapist will use EMDR (or other form of bilateral Stimulation), Solution-Focused Therapy and Supportive Psychotherapy to address future therapy goals, reinforcing confidence. Behavioral Health Treatment Plan and Discharge Planning: Jeralyn Rubinstein is aware of and agrees to continue to work on their treatment plan. They have identified and are working toward their discharge goals. yes    Visit start and stop times:    07/19/23  Start Time: 1800  Stop Time: 1850  Total Visit Time: 50 minutes    Virtual Regular Visit    Verification of patient location:    Patient is located at Home in the following state in which I hold an active license [place]      Assessment/Plan:    Problem List Items Addressed This Visit        Other    Adjustment disorder - Primary       Goals addressed in session: Goal 1          Reason for visit is No chief complaint on file. Encounter provider Ayanna Terrell LCSW    Provider located at [address] [place] [address] Road [address] [phone]      Recent Visits  Date Type Provider Dept   07/12/23 Telemedicine Ayanna Terrell LCSW [facility]   Showing recent visits within past 7 days and meeting all other requirements  Today's Visits  Date Type Provider Dept   07/19/23 Telemedicine Ayanna Terrell LCSW [facility]   Showing today's visits and meeting all other requirements  Future Appointments  No visits were found meeting these conditions. Showing future appointments within next 150 days and meeting all other requirements       The patient was identified by name and date of birth. Karine Dan was informed that this is a telemedicine visit and that the visit is being conducted throughthe [employer] platform. She agrees to proceed. .  My office door was closed.  No one else was in the room. She acknowledged consent and understanding of privacy and security of the video platform. The patient has agreed to participate and understands they can discontinue the visit at any time. Patient is aware this is a billable service. Alverto Fabian is a 39 y.o. female Karine appeared calm and cooperative and was able to engage in interventions for improving her sense of confidence in herself and reduce anxiety . HPI     Past Medical History:   Diagnosis Date   • Abnormal Pap smear of cervix    • Allergic rhinitis     last assessed-2012   • Anemia    • Chronic pain syndrome 2020   • SANDEEP III (cervical intraepithelial neoplasia grade III) with severe dysplasia     LEEP cone biopsy for SANDEEP-3 04. Done at the hospital   • Gestational diabetes     antepartum condition or prior complicated delivery   • Impacted cerumen of both ears     last assessed-2012   • Low back pain    • Oligomenorrhea    • Spinal stenosis    • Spinal stenosis of lumbar region without neurogenic claudication 2019   • Vaginal candidiasis     last assessed-2013   • Varicella     chickenpox as a child   • Vertigo     last assessed-2013       Past Surgical History:   Procedure Laterality Date   • CERVICAL BIOPSY  W/ LOOP ELECTRODE EXCISION  2004    Pathololgy with SANDEEP-2 to 3, with free margins but dysplasia extended to within 1mm of endocervical margin. 2004   •  SECTION, LOW TRANSVERSE  2009    Primary low transverse  for arrest in the active phase at 4 cm with unengaged vertex despite hours of labor   • COLPOSCOPY W/ BIOPSY / CURETTAGE  2003   • EPIDURAL BLOCK INJECTION N/A 2020    Procedure: L4-L5 LUMBAR EPIDURAL STEROID INJECTION (85988);   Surgeon: Davis Corbett MD;  Location: [facility] MAIN OR;  Service: Pain Management    • FOOT SURGERY Right    • TOOTH EXTRACTION         Current Outpatient Medications   Medication Sig Dispense Refill   • carbamide peroxide (Debrox) 6.5 % otic solution  (Patient not taking: Reported on 3/24/2023)     • fluticasone (FLONASE) 50 mcg/act nasal spray 1 spray into each nostril daily     • loratadine (Alavert) 10 MG dissolvable tablet      • triamcinolone (KENALOG) 0.1 % cream Apply topically 2 (two) times a day 30 g 0     No current facility-administered medications for this visit. No Known Allergies    Review of Systems    Video Exam    There were no vitals filed for this visit.     Physical Exam

## 2023-07-26 ENCOUNTER — TELEMEDICINE (OUTPATIENT)
Dept: PSYCHIATRY | Facility: CLINIC | Age: 43
End: 2023-07-26
Payer: COMMERCIAL

## 2023-07-26 DIAGNOSIS — F43.20 ADJUSTMENT DISORDER, UNSPECIFIED TYPE: Primary | ICD-10-CM

## 2023-07-26 PROCEDURE — 90834 PSYTX W PT 45 MINUTES: CPT

## 2023-07-26 NOTE — PSYCH
Behavioral Health Psychotherapy Progress Note    Psychotherapy Provided: Individual Psychotherapy     1. Adjustment disorder, unspecified type            Goals addressed in session: Goal 1     DATA: Karine shared she was feeling good and enjoyed her weekend. Therapist and Jamila Marie talked about therapy and how she is feeling going forward. She shared she feels she has done some processing and feels better about her dad's death and more confident to be herself. She shared that she feels she delays things because of trust in her clinical judgment at work, so she procrastinates. She shares that she catastrophizes about patient reactions to her clinical judgment when pausing their procedure for weight loss surgery. She talked about not wanting to consult with other social workers about their opinions because she thinks they will think less of her. Therapist asked if others ask her opinion and she said they do, and she does not think less of them. She talked about feeling like she is not always vulnerable with friends and might want to be more vulnerable. She shared she wants to be open with them. She is looking forward to this trip. During this session, this clinician used the following therapeutic modalities: Supportive Psychotherapy    Substance Abuse was not addressed during this session. If the client is diagnosed with a co-occurring substance use disorder, please indicate any changes in the frequency or amount of use: . Stage of change for addressing substance use diagnoses: No substance use/Not applicable    ASSESSMENT:  Karine Dan presents with a Euthymic/ normal mood. her affect is Normal range and intensity, which is congruent, with her mood and the content of the session. The client has made progress on their goals. Karine Dan presents with a none risk of suicide, none risk of self-harm, and none risk of harm to others.     For any risk assessment that surpasses a "low" rating, a safety plan must be developed. A safety plan was indicated: no  If yes, describe in detail     PLAN: Between sessions, Karine Dan will be more open and vulnerable with friends; will trust her clinical judgment. At the next session, the therapist will use Supportive Psychotherapy to address negative self talk. Behavioral Health Treatment Plan and Discharge Planning: Lety Marcial is aware of and agrees to continue to work on their treatment plan. They have identified and are working toward their discharge goals.  yes    Visit start and stop times:    Start time:  1800  End time : 1852  Total time: 52 minutes  Behavioral Health Psychotherapy Progress Note

## 2023-08-10 ENCOUNTER — HOSPITAL ENCOUNTER (OUTPATIENT)
Dept: RADIOLOGY | Facility: IMAGING CENTER | Age: 43
Discharge: HOME | End: 2023-08-10
Payer: COMMERCIAL

## 2023-08-10 VITALS — BODY MASS INDEX: 30.71 KG/M2 | HEIGHT: 64 IN | WEIGHT: 179.9 LBS

## 2023-08-10 DIAGNOSIS — Z12.31 ENCOUNTER FOR SCREENING MAMMOGRAM FOR BREAST CANCER: ICD-10-CM

## 2023-08-10 PROCEDURE — 77063 BREAST TOMOSYNTHESIS BI: CPT

## 2023-08-10 PROCEDURE — 77067 SCR MAMMO BI INCL CAD: CPT

## 2023-08-16 ENCOUNTER — TELEMEDICINE (OUTPATIENT)
Dept: PSYCHIATRY | Facility: CLINIC | Age: 43
End: 2023-08-16
Payer: COMMERCIAL

## 2023-08-16 DIAGNOSIS — F43.20 ADJUSTMENT DISORDER, UNSPECIFIED TYPE: Primary | ICD-10-CM

## 2023-08-16 PROCEDURE — 90834 PSYTX W PT 45 MINUTES: CPT

## 2023-08-16 NOTE — PSYCH
Behavioral Health Psychotherapy Progress Note    Psychotherapy Provided: Individual Psychotherapy     1. Adjustment disorder, unspecified type            Goals addressed in session: Goal 1     DATA: Karine shared she had a good trip to [facility] and had fun with her friends. She said she enjoyed being at the beach with friends, had good food and being active. She shared she has been thinking about Maricruz Lloyd and her parenting being more hands off now that he is 15, and is more close to [facility] than to her at this time. Therapist gave feedback to her about adolescent development and individuation and normal adolescent behaviors. She shared that Maricruz Lloyd confided in his dad that he was feeling a little depressed and lost some interest in some things he used to do because of a break up he had and had lost a friend. Karine and therapist updated a treatment plan and crisis plan today in session. During this session, this clinician used the following therapeutic modalities: Solution-Focused Therapy and Supportive Psychotherapy    Substance Abuse was not addressed during this session. If the client is diagnosed with a co-occurring substance use disorder, please indicate any changes in the frequency or amount of use: . Stage of change for addressing substance use diagnoses: No substance use/Not applicable    ASSESSMENT:  Karine Dan presents with a Euthymic/ normal mood. her affect is Normal range and intensity, which is congruent, with her mood and the content of the session. The client has made progress on their goals. Karine Dan presents with a none risk of suicide, none risk of self-harm, and none risk of harm to others. For any risk assessment that surpasses a "low" rating, a safety plan must be developed. A safety plan was indicated: no  If yes, describe in detail     PLAN: Between sessions, Karine Dan will .  At the next session, the therapist will use Supportive Psychotherapy to address adjustment disorder. Behavioral Health Treatment Plan and Discharge Planning: Aura Barajas is aware of and agrees to continue to work on their treatment plan. They have identified and are working toward their discharge goals. yes    Visit start and stop times:    08/16/23  Start Time: 1800  Stop Time: 1848  Total Visit Time: 48 minutes    Virtual Regular Visit    Verification of patient location:    Patient is located at Home in the following state in which I hold an active license [place]      Assessment/Plan:    Problem List Items Addressed This Visit        Other    Adjustment disorder - Primary       Goals addressed in session: Goal 1          Reason for visit is No chief complaint on file. Encounter provider Tony Blanton LCSW    Provider located at [address] [place] [id number] [phone]      Recent Visits  No visits were found meeting these conditions. Showing recent visits within past 7 days and meeting all other requirements  Today's Visits  Date Type Provider Dept   08/16/23 Telemedicine Tony Blanton LCSW [facility]   Showing today's visits and meeting all other requirements  Future Appointments  No visits were found meeting these conditions. Showing future appointments within next 150 days and meeting all other requirements       The patient was identified by name and date of birth. Karine Dan was informed that this is a telemedicine visit and that the visit is being conducted throughthe [employer] platform. She agrees to proceed. .  My office door was closed. No one else was in the room. She acknowledged consent and understanding of privacy and security of the video platform. The patient has agreed to participate and understands they can discontinue the visit at any time. Patient is aware this is a billable service.      Subjective  Karine Dan is a 39 y.o. female Italia Montalvo appeared calm and cooperative and shared thoughts and feelings during session . HPI     Past Medical History:   Diagnosis Date   • Abnormal Pap smear of cervix    • Allergic rhinitis     last assessed-2012   • Anemia    • Chronic pain syndrome 2020   • SANDEEP III (cervical intraepithelial neoplasia grade III) with severe dysplasia     LEEP cone biopsy for SANDEEP-3 04. Done at the hospital   • Gestational diabetes     antepartum condition or prior complicated delivery   • Impacted cerumen of both ears     last assessed-2012   • Low back pain    • Oligomenorrhea    • Spinal stenosis    • Spinal stenosis of lumbar region without neurogenic claudication 2019   • Vaginal candidiasis     last assessed-2013   • Varicella     chickenpox as a child   • Vertigo     last assessed-2013       Past Surgical History:   Procedure Laterality Date   • CERVICAL BIOPSY  W/ LOOP ELECTRODE EXCISION  2004    Pathololgy with SANDEEP-2 to 3, with free margins but dysplasia extended to within 1mm of endocervical margin. 2004   •  SECTION, LOW TRANSVERSE  2009    Primary low transverse  for arrest in the active phase at 4 cm with unengaged vertex despite hours of labor   • COLPOSCOPY W/ BIOPSY / CURETTAGE  2003   • EPIDURAL BLOCK INJECTION N/A 2020    Procedure: L4-L5 LUMBAR EPIDURAL STEROID INJECTION (78324);   Surgeon: Shireen Dahl MD;  Location: [facility] MAIN OR;  Service: Pain Management    • FOOT SURGERY Right    • TOOTH EXTRACTION         Current Outpatient Medications   Medication Sig Dispense Refill   • carbamide peroxide (Debrox) 6.5 % otic solution  (Patient not taking: Reported on 3/24/2023)     • fluticasone (FLONASE) 50 mcg/act nasal spray 1 spray into each nostril daily     • loratadine (Alavert) 10 MG dissolvable tablet      • triamcinolone (KENALOG) 0.1 % cream Apply topically 2 (two) times a day 30 g 0     No current facility-administered medications for this visit. No Known Allergies    Review of Systems    Video Exam    There were no vitals filed for this visit.     Physical Exam

## 2023-08-16 NOTE — BH TREATMENT PLAN
Outpatient Behavioral Health Psychotherapy Treatment Plan    Karine Dan  1980     Date of Initial Psychotherapy Assessment: 2/1/2023   Date of Current Treatment Plan: 08/16/23  Treatment Plan Target Date: 8/16/2024  Treatment Plan Expiration Date: 1/31/2024    Diagnosis:   1. Adjustment disorder, unspecified type            Area(s) of Need: Want to take reflection about my parents and balance honoring my feelings as well as being a parent. Long Term Goal 1 (in the client's own words): I want to honor and have my own feelings while balancing them in my responsibilities as parent, wife and friend. Stage of Change: Contemplation    Target Date for completion: 8/16/2024     Anticipated therapeutic modalities: Supportive therapy, CBT, EMDR     People identified to complete this goal: sherly Milton      Objective 1: (identify the means of measuring success in meeting the objective): Therapist will engage Seth Banda in supportive therapy as well as CBT and EMDR to process past events and relationships to navigate current relationships and issues. Therapist will encourage Karine to utilize coping skills for improving her relationships and emotions. Karine will utilize coping skills consistently in 8 out of 10 situations. I am currently under the care of a [facility] psychiatric provider: no    My [facility] psychiatric provider is: n/a    I am currently taking psychiatric medications: No    I feel that I will be ready for discharge from mental health care when I reach the following (measurable goal/objective): When I can successfully navigate my relationships and feel emotionally regulated in at least 8 out of 10 situations    For children and adults who have a legal guardian:   Has there been any change to custody orders and/or guardianship status? NA. If yes, attach updated documentation.     I have updated my Crisis Plan and have been offered a copy of this plan    [address] [place] [address] [facility] Diagnosis and Treatment Plan explained to [address] [place] [address] acknowledges an understanding of their diagnosis. Karine Dan agrees to this treatment plan. I have been offered a copy of this Treatment Plan. Yes    Karinemya Dan, 1980, actively participated in the review and update of this treatment plan during a virtual session, using the [employer] platform. Karine Dan  provided verbal consent on 8/16/2023 at 6:43 PM. The treatment plan was transcribed into the [employer] Record at a later time.

## 2023-08-30 ENCOUNTER — TELEMEDICINE (OUTPATIENT)
Dept: PSYCHIATRY | Facility: CLINIC | Age: 43
End: 2023-08-30
Payer: COMMERCIAL

## 2023-08-30 DIAGNOSIS — F43.20 ADJUSTMENT DISORDER, UNSPECIFIED TYPE: Primary | ICD-10-CM

## 2023-08-30 PROCEDURE — 90834 PSYTX W PT 45 MINUTES: CPT

## 2023-08-30 NOTE — PSYCH
Behavioral Health Psychotherapy Progress Note    Psychotherapy Provided: Individual Psychotherapy     1. Adjustment disorder, unspecified type            Goals addressed in session: Goal 1     DATA: Karine shared she is doing well and shared she is feeling good and her son started school and enjoys it. She shared she is happy for him for starting high school. She talked about having some SAD and therapist and Roberto Marion talked about how to combat this. She shared she and Cy Frankel had a disagreement recently when she was having a care home dinner for her mom with her aunt and mom and his parents there. She said he got angry when she said she would give [place] wine. Cy Frankel got angry with her for this. It triggered feelings of insecurity in her. She shared he went through difficulties because his father had been a drinker. She discussed also feeling ignored by [place] on Saturday. She shared his look of disgust on his face triggered her abandonment issues. Therapist suggested she sit with and honor her feelings and also to talk to herself reassuringly. She shared she had been texting with Cy Frankel about their disagreement and how they triggered each other. She shared she can ask for a few minutes in the moment to help her calm down. She agreed to work on an EMDR target in next session for her abandonment issues. During this session, this clinician used the following therapeutic modalities: EMDR (or other form of bilateral Stimulation) and Supportive Psychotherapy    Substance Abuse was not addressed during this session. If the client is diagnosed with a co-occurring substance use disorder, please indicate any changes in the frequency or amount of use: . Stage of change for addressing substance use diagnoses: No substance use/Not applicable    ASSESSMENT:  Karine Dan presents with a Euthymic/ normal mood.      her affect is Normal range and intensity, which is congruent, with her mood and the content of the session. The client has made progress on their goals. Karine Dan presents with a none risk of suicide, none risk of self-harm, and none risk of harm to others. For any risk assessment that surpasses a "low" rating, a safety plan must be developed. A safety plan was indicated: no  If yes, describe in detail     PLAN: Between sessions, Wale Morocho will talk with her  about her triggers. At the next session, the therapist will use EMDR (or other form of bilateral Stimulation) and Supportive Psychotherapy to address interpersonal interactions, past trauma. Behavioral Health Treatment Plan and Discharge Planning: Wale Morocho is aware of and agrees to continue to work on their treatment plan. They have identified and are working toward their discharge goals. yes    Visit start and stop times:    08/30/23  Start Time: 1800  Stop Time: 1852  Total Visit Time: 52 minutes    Virtual Regular Visit    Verification of patient location:    Patient is located at Home in the following state in which I hold an active license [place]      Assessment/Plan:    Problem List Items Addressed This Visit        Other    Adjustment disorder - Primary       Goals addressed in session: Goal 1          Reason for visit is No chief complaint on file. Encounter provider Fernando Leiva LCSW    Provider located at [address] [place] [id number] [phone]      Recent Visits  No visits were found meeting these conditions. Showing recent visits within past 7 days and meeting all other requirements  Today's Visits  Date Type Provider Dept   08/30/23 Telemedicine Fernando Leiva LCSW [facility]   Showing today's visits and meeting all other requirements  Future Appointments  No visits were found meeting these conditions.   Showing future appointments within next 150 days and meeting all other requirements       The patient was identified by name and date of birth. Karine Dan was informed that this is a telemedicine visit and that the visit is being conducted throughthe [employer] platform. She agrees to proceed. .  My office door was closed. No one else was in the room. She acknowledged consent and understanding of privacy and security of the video platform. The patient has agreed to participate and understands they can discontinue the visit at any time. Patient is aware this is a billable service. Subjective  Karine Dan is a 39 y.o. female Karine appeared calm and cooperative and openly communicate thoughts and feelings. HPI     Past Medical History:   Diagnosis Date   • Abnormal Pap smear of cervix    • Allergic rhinitis     last assessed-2012   • Anemia    • Chronic pain syndrome 2020   • SANDEEP III (cervical intraepithelial neoplasia grade III) with severe dysplasia     LEEP cone biopsy for SANDEEP-3 04. Done at the hospital   • Gestational diabetes     antepartum condition or prior complicated delivery   • Impacted cerumen of both ears     last assessed-2012   • Low back pain    • Oligomenorrhea    • Spinal stenosis    • Spinal stenosis of lumbar region without neurogenic claudication 2019   • Vaginal candidiasis     last assessed-2013   • Varicella     chickenpox as a child   • Vertigo     last assessed-2013       Past Surgical History:   Procedure Laterality Date   • CERVICAL BIOPSY  W/ LOOP ELECTRODE EXCISION  2004    Pathololgy with SANDEEP-2 to 3, with free margins but dysplasia extended to within 1mm of endocervical margin.   2004   •  SECTION, LOW TRANSVERSE  2009    Primary low transverse  for arrest in the active phase at 4 cm with unengaged vertex despite hours of labor   • COLPOSCOPY W/ BIOPSY / CURETTAGE  2003   • EPIDURAL BLOCK INJECTION N/A 2020    Procedure: L4-L5 LUMBAR EPIDURAL STEROID INJECTION (62472); Surgeon: Sally Isidro MD;  Location: [facility] MAIN OR;  Service: Pain Management    • FOOT SURGERY Right    • TOOTH EXTRACTION         Current Outpatient Medications   Medication Sig Dispense Refill   • carbamide peroxide (Debrox) 6.5 % otic solution  (Patient not taking: Reported on 3/24/2023)     • fluticasone (FLONASE) 50 mcg/act nasal spray 1 spray into each nostril daily     • loratadine (Alavert) 10 MG dissolvable tablet      • triamcinolone (KENALOG) 0.1 % cream Apply topically 2 (two) times a day 30 g 0     No current facility-administered medications for this visit. No Known Allergies    Review of Systems    Video Exam    There were no vitals filed for this visit.     Physical Exam     Visit Time

## 2023-09-08 NOTE — PSYCH
"Behavioral Health Psychotherapy Progress Note    Psychotherapy Provided: Individual Psychotherapy     1  Adjustment disorder, unspecified type            Goals addressed in session: Goal 1     DATA: Karine shared that she has some good things happening this summer including her mom retiring, her son working for a [employer] camp, finishing a [employer] project, [employer] coming up for her conference, and hot yoga with friends  She shared she saw new workspace and likes it  Therapist and Karine processed EMDR target of her parents getting together and not caring about Karine's feelings when they got together to have sex when broken up, and the back and forth in their relationship being on and off  She was able to process feelings of feeling she can't be vulnerable and has to [facility] her shit together\" and not be allowed to have certain feelings  Therapist led Dwayne Ortiz through an inner child exercise to see if she could identify what her inner child needs to be vulnerable and heal   She was able to identify that she wanted them to step away from their dysfunctional relationship  She asked her parents for things she needed as a child and they couldn't give it, and her inner child needed her to ask for help and she said she would try  Therapist ended session with container exercise  Karine shared she was feeling good and grounded at close of session  During this session, this clinician used the following therapeutic modalities: EMDR (or other form of bilateral Stimulation) and Mindfulness-based Strategies    Substance Abuse was not addressed during this session  If the client is diagnosed with a co-occurring substance use disorder, please indicate any changes in the frequency or amount of use:   Stage of change for addressing substance use diagnoses: No substance use/Not applicable    ASSESSMENT:  Karine Dan presents with a Euthymic/ normal mood       her affect is Normal range and intensity, which is congruent, with her " "mood and the content of the session  The client has made progress on their goals  Karine Dan presents with a none risk of suicide, none risk of self-harm, and none risk of harm to others  For any risk assessment that surpasses a \"low\" rating, a safety plan must be developed  A safety plan was indicated: no  If yes, describe in detail     PLAN: Between sessions, Karine Dan will use container exercise as needed  At the next session, the therapist will use EMDR (or other form of bilateral Stimulation) and Mindfulness-based Strategies to address past hurts, lack of trust in others  Behavioral Health Treatment Plan and Discharge Planning: Isela Urena is aware of and agrees to continue to work on their treatment plan  They have identified and are working toward their discharge goals  yes    Visit start and stop times:    05/31/23  Start Time: 1800  Stop Time: 1852  Total Visit Time: 52 minutes    Virtual Regular Visit    Verification of patient location:    Patient is located at Home in the following state in which I hold an active license [place]      Assessment/Plan:    Problem List Items Addressed This Visit        Other    Adjustment disorder - Primary       Goals addressed in session: Goal 1          Reason for visit is No chief complaint on file         Encounter provider Siri Gomes LCSW    Provider located at [address] [place] [id number] [phone]      Recent Visits  Date Type Provider Dept   05/24/23 Telemedicine Siri Gomes LCSW [facility]   Showing recent visits within past 7 days and meeting all other requirements  Today's Visits  Date Type Provider Dept   05/31/23 Telemedicine Siri Gomes LCSW [facility]   Showing today's visits and meeting all other requirements  Future Appointments  No visits were found meeting " Allergy/Immunology these conditions  Showing future appointments within next 150 days and meeting all other requirements       The patient was identified by name and date of birth  Karine Dan was informed that this is a telemedicine visit and that the visit is being conducted throughthe [employer] platform  She agrees to proceed     My office door was closed  No one else was in the room  She acknowledged consent and understanding of privacy and security of the video platform  The patient has agreed to participate and understands they can discontinue the visit at any time  Patient is aware this is a billable service  Subjective  Karine Mon is a 39 y o  female    HPI     Past Medical History:   Diagnosis Date   • Abnormal Pap smear of cervix    • Allergic rhinitis     last assessed-2012   • Anemia    • Chronic pain syndrome 2020   • SANDEEP III (cervical intraepithelial neoplasia grade III) with severe dysplasia     LEEP cone biopsy for SANDEEP-3 04  Done at the hospital   • Gestational diabetes     antepartum condition or prior complicated delivery   • Impacted cerumen of both ears     last assessed-2012   • Low back pain    • Oligomenorrhea    • Spinal stenosis    • Spinal stenosis of lumbar region without neurogenic claudication 2019   • Vaginal candidiasis     last assessed-2013   • Varicella     chickenpox as a child   • Vertigo     last assessed-2013       Past Surgical History:   Procedure Laterality Date   • CERVICAL BIOPSY  W/ LOOP ELECTRODE EXCISION  2004    Pathololgy with SANDEEP-2 to 3, with free margins but dysplasia extended to within 1mm of endocervical margin    2004   •  SECTION, LOW TRANSVERSE  2009    Primary low transverse  for arrest in the active phase at 4 cm with unengaged vertex despite hours of labor   • COLPOSCOPY W/ BIOPSY / CURETTAGE  2003   • EPIDURAL BLOCK INJECTION N/A 2020    Procedure: L4-L5 LUMBAR EPIDURAL STEROID INJECTION (40592); Surgeon: Jose Felix MD;  Location: [facility] MAIN OR;  Service: Pain Management    • FOOT SURGERY Right    • TOOTH EXTRACTION         Current Outpatient Medications   Medication Sig Dispense Refill   • carbamide peroxide (Debrox) 6 5 % otic solution  (Patient not taking: Reported on 3/24/2023)     • fluticasone (FLONASE) 50 mcg/act nasal spray 1 spray into each nostril daily     • loratadine (Alavert) 10 MG dissolvable tablet      • triamcinolone (KENALOG) 0 1 % cream Apply topically 2 (two) times a day 30 g 0     No current facility-administered medications for this visit  No Known Allergies    Review of Systems    Video Exam    There were no vitals filed for this visit      Physical Exam

## 2023-09-13 ENCOUNTER — TELEMEDICINE (OUTPATIENT)
Dept: PSYCHIATRY | Facility: CLINIC | Age: 43
End: 2023-09-13
Payer: COMMERCIAL

## 2023-09-13 DIAGNOSIS — F43.20 ADJUSTMENT DISORDER, UNSPECIFIED TYPE: Primary | ICD-10-CM

## 2023-09-13 PROCEDURE — 90834 PSYTX W PT 45 MINUTES: CPT

## 2023-09-13 NOTE — PSYCH
Behavioral Health Psychotherapy Progress Note    Psychotherapy Provided: Individual Psychotherapy     1. 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 She shared some pros and cons of taking the position. She shared she enjoys thinking about being a leader, and wants to be able to admit when she does not know things. She is working to reframe her thinking of asking for help is weak to asking for help is brave. She shared she is working on learning to delegate things at work. She shared she wants to talk about structuring her son's routine and boundaries. She shared about a "poo mom move" of noticing her son was not hanging out with his friends as much and asked about it and went to his phone and read text messages of his. She shared she felt "icky" about it and would not do this again anytime soon. Karine shared that she would like to love herself more and listed things she does not like about herself including her physical appearance as well as her instincts with parenting, work and her own opinions and that she would like to work on this. During this session, this clinician used the following therapeutic modalities: Supportive Psychotherapy    Substance Abuse was not addressed during this session. If the client is diagnosed with a co-occurring substance use disorder, please indicate any changes in the frequency or amount of use: . Stage of change for addressing substance use diagnoses: No substance use/Not applicable    ASSESSMENT:  Karine Dan presents with a Euthymic/ normal mood. her affect is Normal range and intensity, which is congruent, with her mood and the content of the session. The client has made progress on their goals.      Karine Dan presents with a none risk of suicide, none risk of self-harm, and none risk of harm to others. For any risk assessment that surpasses a "low" rating, a safety plan must be developed. A safety plan was indicated: no  If yes, describe in detail     PLAN: Between sessions, Karinechantel Dan will practice self love and confidence. At the next session, the therapist will use Supportive Psychotherapy to address self doubt. Behavioral Health Treatment Plan and Discharge Planning: Italia Valles is aware of and agrees to continue to work on their treatment plan. They have identified and are working toward their discharge goals. yes    Visit start and stop times:    09/13/23  Start Time: 1800  Stop Time: 1852  Total Visit Time: 52 minutes    Virtual Regular Visit    Verification of patient location:    Patient is located at Home in the following state in which I hold an active license [place]      Assessment/Plan:    Problem List Items Addressed This Visit        Other    Adjustment disorder - Primary       Goals addressed in session: Goal 1          Reason for visit is No chief complaint on file. Encounter provider Georgina Vasquez LCSW    Provider located at [address] [place] [id number] [phone]      Recent Visits  No visits were found meeting these conditions. Showing recent visits within past 7 days and meeting all other requirements  Today's Visits  Date Type Provider Dept   09/13/23 Telemedicine Georgina Vasquez LCSW [facility]   Showing today's visits and meeting all other requirements  Future Appointments  No visits were found meeting these conditions. Showing future appointments within next 150 days and meeting all other requirements       The patient was identified by name and date of birth. Karine Dan was informed that this is a telemedicine visit and that the visit is being conducted throughthe [employer] platform. She agrees to proceed. .  My office door was closed. No one else was in the room. She acknowledged consent and understanding of privacy and security of the video platform. The patient has agreed to participate and understands they can discontinue the visit at any time. Patient is aware this is a billable service. Subjective  Karine Dan is a 39 y.o. female Karine appeared calm and cooperative and shared her thoughts and feelings about work and about her pareting skills and her sense of self love. Karine identified things to work on in therapy including navigating her parenting boundaries and helping her son to grow up from a boy to a young man. HPI     Past Medical History:   Diagnosis Date   • Abnormal Pap smear of cervix    • Allergic rhinitis     last assessed-2012   • Anemia    • Chronic pain syndrome 2020   • SANDEEP III (cervical intraepithelial neoplasia grade III) with severe dysplasia     LEEP cone biopsy for SANDEEP-3 04. Done at the hospital   • Gestational diabetes     antepartum condition or prior complicated delivery   • Impacted cerumen of both ears     last assessed-2012   • Low back pain    • Oligomenorrhea    • Spinal stenosis    • Spinal stenosis of lumbar region without neurogenic claudication 2019   • Vaginal candidiasis     last assessed-2013   • Varicella     chickenpox as a child   • Vertigo     last assessed-2013       Past Surgical History:   Procedure Laterality Date   • CERVICAL BIOPSY  W/ LOOP ELECTRODE EXCISION  2004    Pathololgy with SANDEEP-2 to 3, with free margins but dysplasia extended to within 1mm of endocervical margin.   2004   •  SECTION, LOW TRANSVERSE  2009    Primary low transverse  for arrest in the active phase at 4 cm with unengaged vertex despite hours of labor   • COLPOSCOPY W/ BIOPSY / CURETTAGE  2003   • EPIDURAL BLOCK INJECTION N/A 2020    Procedure: L4-L5 LUMBAR EPIDURAL STEROID INJECTION (21242); Surgeon: Marni Ramirez MD;  [facility] MAIN OR;  Service: Pain Management    • FOOT SURGERY Right    • TOOTH EXTRACTION         Current Outpatient Medications   Medication Sig Dispense Refill   • carbamide peroxide (Debrox) 6.5 % otic solution  (Patient not taking: Reported on 3/24/2023)     • fluticasone (FLONASE) 50 mcg/act nasal spray 1 spray into each nostril daily     • loratadine (Alavert) 10 MG dissolvable tablet      • triamcinolone (KENALOG) 0.1 % cream Apply topically 2 (two) times a day 30 g 0     No current facility-administered medications for this visit. No Known Allergies    Review of Systems    Video Exam    There were no vitals filed for this visit.     Physical Exam

## 2023-09-27 ENCOUNTER — TELEMEDICINE (OUTPATIENT)
Dept: PSYCHIATRY | Facility: CLINIC | Age: 43
End: 2023-09-27
Payer: COMMERCIAL

## 2023-09-27 DIAGNOSIS — F43.20 ADJUSTMENT DISORDER, UNSPECIFIED TYPE: Primary | ICD-10-CM

## 2023-09-27 PROCEDURE — 90834 PSYTX W PT 45 MINUTES: CPT

## 2023-09-27 NOTE — PSYCH
Behavioral Health Psychotherapy Progress Note    Psychotherapy Provided: Individual Psychotherapy     1. Adjustment disorder, unspecified type            Goals addressed in session: Goal 1     DATA: Karine shared she is "good" and her 5k went well. Karine said she has been thinking about her dad and it would be 2 years since he  on Oct 5. She said she thinks about wishing they had more time and the end of his life. She shared some good memories of her parents and said their relationships have gotten better over time. She shared her mom has "no filter" recently at football games saying things about players and other bands. Therapist asked about SAD and she said her energy is low right now and she has a plan for improving her energy. She also enjoys the holidays. She shared she feels she is doing well with balancing her parenting between giving privacy and making sure he's okay. She shared that she has what if thoughts about Rogelio Fan and it comes from having high strung women in her life. She worries about not having a close relationship with Rogelio Fan later on like she doesn't have with her mom. She shared her mom and grandmother could not admit mistakes and also lost interest in what she was saying. Therapist validated her differences as a mom with her son in positive ways. During this session, this clinician used the following therapeutic modalities: Supportive Psychotherapy    Substance Abuse was not addressed during this session. If the client is diagnosed with a co-occurring substance use disorder, please indicate any changes in the frequency or amount of use: . Stage of change for addressing substance use diagnoses: No substance use/Not applicable    ASSESSMENT:  Karine Dan presents with a Euthymic/ normal mood. her affect is Normal range and intensity, which is congruent, with her mood and the content of the session. The client has made progress on their goals.      Karine Dan presents with a none risk of suicide, none risk of self-harm, and none risk of harm to others. For any risk assessment that surpasses a "low" rating, a safety plan must be developed. A safety plan was indicated: no  If yes, describe in detail     PLAN: Between sessions, Karine Dan will utilize positive self talk. At the next session, the therapist will use Cognitive Behavioral Therapy and Supportive Psychotherapy to address negative self talk. Behavioral Health Treatment Plan and Discharge Planning: Ann Marie Roberts is aware of and agrees to continue to work on their treatment plan. They have identified and are working toward their discharge goals. yes    Visit start and stop times:    09/27/23  Start Time: 1800  Stop Time: 1852  Total Visit Time: 52 minutes   Behavioral Health Psychotherapy Progress Note      Virtual Regular Visit    Verification of patient location:    Patient is located at Home in the following state in which I hold an active license [place]      Assessment/Plan:    Problem List Items Addressed This Visit        Other    Adjustment disorder - Primary       Goals addressed in session: Goal 1          Reason for visit is No chief complaint on file. Encounter provider Melvi Rico LCSW    Provider located at [address] [place] [id number] [phone]      Recent Visits  No visits were found meeting these conditions. Showing recent visits within past 7 days and meeting all other requirements  Today's Visits  Date Type Provider Dept   09/27/23 Telemedicine Melvi Rico LCSW [facility]   Showing today's visits and meeting all other requirements  Future Appointments  No visits were found meeting these conditions. Showing future appointments within next 150 days and meeting all other requirements       The patient was identified by name and date of birth. Karine Dan was informed that this is a telemedicine visit and that the visit is being conducted throughthe [employer] platform. She agrees to proceed. .  My office door was closed. No one else was in the room. She acknowledged consent and understanding of privacy and security of the video platform. The patient has agreed to participate and understands they can discontinue the visit at any time. Patient is aware this is a billable service. Subjective  Karine Smith is a 39 y.o. female  . HPI     Past Medical History:   Diagnosis Date   • Abnormal Pap smear of cervix    • Allergic rhinitis     last assessed-2012   • Anemia    • Chronic pain syndrome 2020   • SANDEEP III (cervical intraepithelial neoplasia grade III) with severe dysplasia     LEEP cone biopsy for SANDEEP-3 04. Done at the hospital   • Gestational diabetes     antepartum condition or prior complicated delivery   • Impacted cerumen of both ears     last assessed-2012   • Low back pain    • Oligomenorrhea    • Spinal stenosis    • Spinal stenosis of lumbar region without neurogenic claudication 2019   • Vaginal candidiasis     last assessed-2013   • Varicella     chickenpox as a child   • Vertigo     last assessed-2013       Past Surgical History:   Procedure Laterality Date   • CERVICAL BIOPSY  W/ LOOP ELECTRODE EXCISION  2004    Pathololgy with SANDEEP-2 to 3, with free margins but dysplasia extended to within 1mm of endocervical margin. 2004   •  SECTION, LOW TRANSVERSE  2009    Primary low transverse  for arrest in the active phase at 4 cm with unengaged vertex despite hours of labor   • COLPOSCOPY W/ BIOPSY / CURETTAGE  2003   • EPIDURAL BLOCK INJECTION N/A 2020    Procedure: L4-L5 LUMBAR EPIDURAL STEROID INJECTION (62211);   Surgeon: Irma Neumann MD;  Location: [facility] MAIN OR;  Service: Pain Management    • FOOT SURGERY Right    • TOOTH EXTRACTION Current Outpatient Medications   Medication Sig Dispense Refill   • carbamide peroxide (Debrox) 6.5 % otic solution  (Patient not taking: Reported on 3/24/2023)     • fluticasone (FLONASE) 50 mcg/act nasal spray 1 spray into each nostril daily     • loratadine (Alavert) 10 MG dissolvable tablet      • triamcinolone (KENALOG) 0.1 % cream Apply topically 2 (two) times a day 30 g 0     No current facility-administered medications for this visit. No Known Allergies    Review of Systems    Video Exam    There were no vitals filed for this visit.     Physical Exam

## 2023-10-11 ENCOUNTER — TELEMEDICINE (OUTPATIENT)
Dept: PSYCHIATRY | Facility: CLINIC | Age: 43
End: 2023-10-11
Payer: COMMERCIAL

## 2023-10-11 DIAGNOSIS — F43.20 ADJUSTMENT DISORDER, UNSPECIFIED TYPE: Primary | ICD-10-CM

## 2023-10-11 PROCEDURE — 90834 PSYTX W PT 45 MINUTES: CPT

## 2023-10-11 NOTE — PSYCH
Behavioral Health Psychotherapy Progress Note    Psychotherapy Provided: Individual Psychotherapy     1. Adjustment disorder, unspecified type            Goals addressed in session: Goal 1     DATA: Karine shared she is recovering from aerial yoga and is in muscle pain from this. She said her anniversary of her dad's death on the 5th was okay. She went to his grave on the 4th and thought about him and his death. She said she was able to be vulnerable at work and was proud of herself for this. She shared things are going well with her son Troya Murray in terms of her balance of freedom and supervision. Therapist asked about her inner critic and the critic tells her negative things about her body and food and getting bigger. She says that this comes from her mother criticizing her own body out loud. She said she was made fun of by bullies in middle school. She said she has felt invisible at times because of being heavy. She shared she had a baby and gained weight and was at a heavy weight for 5 years and felt shame, invisible and didn't want pictures taken. She lost a lot of weight and felt like it was an out of body experience for her, and got attention and enjoyed this. She shared she focuses on trouble areas but also appreciates her strength. She shared she could improve her self talk and strives to do so. She shared she can talk to herself in a kinder way about engaging in her healthy habits. During this session, this clinician used the following therapeutic modalities: Cognitive Behavioral Therapy    Substance Abuse was not addressed during this session. If the client is diagnosed with a co-occurring substance use disorder, please indicate any changes in the frequency or amount of use: . Stage of change for addressing substance use diagnoses: No substance use/Not applicable    ASSESSMENT:  Karine Dan presents with a Euthymic/ normal mood.      her affect is Normal range and intensity, which is congruent, with her mood and the content of the session. The client has made progress on their goals. Karine Dan presents with a none risk of suicide, none risk of self-harm, and none risk of harm to others. For any risk assessment that surpasses a "low" rating, a safety plan must be developed. A safety plan was indicated: no  If yes, describe in detail     PLAN: Between sessions, Karine Dan will engage in positive self talk. At the next session, the therapist will use Cognitive Behavioral Therapy and Supportive Psychotherapy to address negative self talk. Behavioral Health Treatment Plan and Discharge Planning: Wale Morocho is aware of and agrees to continue to work on their treatment plan. They have identified and are working toward their discharge goals. yes    Visit start and stop times:    10/11/23  Start Time: 1800  Stop Time: 1852  Total Visit Time: 52 minutes  Virtual Regular Visit    Verification of patient location:    Patient is located at Home in the following state in which I hold an active license [place]      Assessment/Plan:    Problem List Items Addressed This Visit          Other    Adjustment disorder - Primary       Goals addressed in session: Goal 1          Reason for visit is No chief complaint on file. Encounter provider Fernando Leiva LCSW    Provider located at [address] [place] [id number] [phone]      Recent Visits  No visits were found meeting these conditions. Showing recent visits within past 7 days and meeting all other requirements  Today's Visits  Date Type Provider Dept   10/11/23 Telemedicine Fernando Leiva LCSW [facility]   Showing today's visits and meeting all other requirements  Future Appointments  No visits were found meeting these conditions.   Showing future appointments within next 150 days and meeting all other requirements       The patient was identified by name and date of birth. Karine Dan was informed that this is a telemedicine visit and that the visit is being conducted throughthe [employer] platform. She agrees to proceed. .  My office door was closed. No one else was in the room. She acknowledged consent and understanding of privacy and security of the video platform. The patient has agreed to participate and understands they can discontinue the visit at any time. Patient is aware this is a billable service. Subjective  Karine Trejo is a 39 y.o. female Karine appeared calm and cooperative and was able to engage in open communication, and to engage in talking about her inner critic and improving her internal dialogue . HPI     Past Medical History:   Diagnosis Date    Abnormal Pap smear of cervix     Allergic rhinitis     last assessed-2012    Anemia     Chronic pain syndrome 2020    SANDEEP III (cervical intraepithelial neoplasia grade III) with severe dysplasia     LEEP cone biopsy for SANDEEP-3 04. Done at the hospital    Gestational diabetes     antepartum condition or prior complicated delivery    Impacted cerumen of both ears     last assessed-2012    Low back pain     Oligomenorrhea     Spinal stenosis     Spinal stenosis of lumbar region without neurogenic claudication 2019    Vaginal candidiasis     last assessed-2013    Varicella     chickenpox as a child    Vertigo     last assessed-2013       Past Surgical History:   Procedure Laterality Date    CERVICAL BIOPSY  W/ LOOP ELECTRODE EXCISION  2004    Pathololgy with SANDEEP-2 to 3, with free margins but dysplasia extended to within 1mm of endocervical margin.   2004     SECTION, LOW TRANSVERSE  2009    Primary low transverse  for arrest in the active phase at 4 cm with unengaged vertex despite hours of labor    COLPOSCOPY W/ BIOPSY / CURETTAGE  2003    EPIDURAL BLOCK INJECTION N/A 7/9/2020    Procedure: L4-L5 LUMBAR EPIDURAL STEROID INJECTION (40348); Surgeon: Ifrah Newsome MD;  Location: [facility] MAIN OR;  Service: Pain Management     FOOT SURGERY Right     TOOTH EXTRACTION         Current Outpatient Medications   Medication Sig Dispense Refill    carbamide peroxide (Debrox) 6.5 % otic solution  (Patient not taking: Reported on 3/24/2023)      fluticasone (FLONASE) 50 mcg/act nasal spray 1 spray into each nostril daily      loratadine (Alavert) 10 MG dissolvable tablet       triamcinolone (KENALOG) 0.1 % cream Apply topically 2 (two) times a day 30 g 0     No current facility-administered medications for this visit. No Known Allergies    Review of Systems    Video Exam    There were no vitals filed for this visit.     Physical Exam

## 2023-11-08 ENCOUNTER — TELEMEDICINE (OUTPATIENT)
Dept: PSYCHIATRY | Facility: CLINIC | Age: 43
End: 2023-11-08
Payer: COMMERCIAL

## 2023-11-08 DIAGNOSIS — F43.20 ADJUSTMENT DISORDER, UNSPECIFIED TYPE: Primary | ICD-10-CM

## 2023-11-08 PROCEDURE — 90834 PSYTX W PT 45 MINUTES: CPT

## 2023-11-08 NOTE — PSYCH
Behavioral Health Psychotherapy Progress Note    Psychotherapy Provided: Individual Psychotherapy     1. Adjustment disorder, unspecified type            Goals addressed in session: Goal 1     DATA: Karine shared the gala she went to was really good and had a good time there. She shared her mom had a recent temper tantrum at a competition for the band. They all drove together in her Sarah and her mom was upset because her mother in law was sitting in the front seat. She decided to drive on her own with her aunt because she didn't get shotgun. Her mom was tense about how crowded the competition was and she doesn't do well with crowds. Her mom wasn't talking to her much and was upset. Her mom upset her for a few days. She processed her feelings about this disagreement and how she felt about it. Therapist helped to point out some ways that Karine did hold some boundaries with her mom and communicated assertively with her. Karine also communicated that she was able to be vulnerable with friends and allow them to comfort her when she was feeling upset about her mother. During this session, this clinician used the following therapeutic modalities: Solution-Focused Therapy and Supportive Psychotherapy    Substance Abuse was not addressed during this session. If the client is diagnosed with a co-occurring substance use disorder, please indicate any changes in the frequency or amount of use: . Stage of change for addressing substance use diagnoses: No substance use/Not applicable    ASSESSMENT:  Karine Dan presents with a Euthymic/ normal mood. her affect is Normal range and intensity, which is congruent, with her mood and the content of the session. The client has made progress on their goals. Karine Dan presents with a none risk of suicide, none risk of self-harm, and none risk of harm to others. For any risk assessment that surpasses a "low" rating, a safety plan must be developed.     A safety plan was indicated: no  If yes, describe in detail     PLAN: Between sessions, Karine Dan will utilize grounding skills and self compassion as needed. At the next session, the therapist will use Cognitive Behavioral Therapy, Dialectical Behavior Therapy, and Supportive Psychotherapy to address trauma response, negative thinking, improved mindfulness. Behavioral Health Treatment Plan and Discharge Planning: Serge Gaines is aware of and agrees to continue to work on their treatment plan. They have identified and are working toward their discharge goals. yes    Visit start and stop times:    11/08/23  Start Time: 1800  Stop Time: 1851  Total Visit Time: 51 minutes  Virtual Regular Visit    Verification of patient location:    Patient is located at Home in the following state in which I hold an active license [place]      Assessment/Plan:    Problem List Items Addressed This Visit       Adjustment disorder - Primary       Goals addressed in session: Goal 1          Reason for visit is No chief complaint on file. Encounter provider Celeste Lua LCSW    Provider located at [address] [place] [id number] [phone]      Recent Visits  No visits were found meeting these conditions. Showing recent visits within past 7 days and meeting all other requirements  Today's Visits  Date Type Provider Dept   11/08/23 Telemedicine Celeste Lua LCSW [facility]   Showing today's visits and meeting all other requirements  Future Appointments  No visits were found meeting these conditions. Showing future appointments within next 150 days and meeting all other requirements       The patient was identified by name and date of birth. Karine Dan was informed that this is a telemedicine visit and that the visit is being conducted throughthe [employer] platform. She agrees to proceed. .  My office door was closed. No one else was in the room. She acknowledged consent and understanding of privacy and security of the video platform. The patient has agreed to participate and understands they can discontinue the visit at any time. Patient is aware this is a billable service. Subjective  Karine Jefferson is a 39 y.o. female  . HPI    Past Medical History:   Diagnosis Date    Abnormal Pap smear of cervix     Allergic rhinitis     last assessed-2012    Anemia     Chronic pain syndrome 2020    SANDEEP III (cervical intraepithelial neoplasia grade III) with severe dysplasia     LEEP cone biopsy for SANDEEP-3 04. Done at the hospital    Gestational diabetes     antepartum condition or prior complicated delivery    Impacted cerumen of both ears     last assessed-2012    Low back pain     Oligomenorrhea     Spinal stenosis     Spinal stenosis of lumbar region without neurogenic claudication 2019    Vaginal candidiasis     last assessed-2013    Varicella     chickenpox as a child    Vertigo     last assessed-2013       Past Surgical History:   Procedure Laterality Date    CERVICAL BIOPSY  W/ LOOP ELECTRODE EXCISION  2004    Pathololgy with SANDEEP-2 to 3, with free margins but dysplasia extended to within 1mm of endocervical margin. 2004     SECTION, LOW TRANSVERSE  2009    Primary low transverse  for arrest in the active phase at 4 cm with unengaged vertex despite hours of labor    COLPOSCOPY W/ BIOPSY / CURETTAGE  2003    EPIDURAL BLOCK INJECTION N/A 2020    Procedure: L4-L5 LUMBAR EPIDURAL STEROID INJECTION ([id number]);   Surgeon: Patria Borges MD;  Location: [facility] MAIN OR;  Service: Pain Management     FOOT SURGERY Right     TOOTH EXTRACTION         Current Outpatient Medications   Medication Sig Dispense Refill    carbamide peroxide (Debrox) 6.5 % otic solution  (Patient not taking: Reported on 3/24/2023)      fluticasone (FLONASE) 50 mcg/act nasal spray 1 spray into each nostril daily      loratadine (Alavert) 10 MG dissolvable tablet       triamcinolone (KENALOG) 0.1 % cream Apply topically 2 (two) times a day 30 g 0     No current facility-administered medications for this visit. No Known Allergies    Review of Systems    Video Exam    There were no vitals filed for this visit.     Physical Exam

## 2023-11-22 ENCOUNTER — TELEMEDICINE (OUTPATIENT)
Dept: PSYCHIATRY | Facility: CLINIC | Age: 43
End: 2023-11-22
Payer: COMMERCIAL

## 2023-11-22 DIAGNOSIS — F43.20 ADJUSTMENT DISORDER, UNSPECIFIED TYPE: Primary | ICD-10-CM

## 2023-11-22 PROCEDURE — 90834 PSYTX W PT 45 MINUTES: CPT

## 2023-11-22 NOTE — PSYCH
Behavioral Health Psychotherapy Progress Note    Psychotherapy Provided: Individual Psychotherapy     1. Adjustment disorder, unspecified type            Goals addressed in session: Goal 1     DATA:  Karine shared she will be spending Thanksgiving at her house including her mom, aunt, her friend Nazario Cortes, brother Bird Montes, Red's parents. She shared she is still thinking about what happened with her mom at the band event with her mom pouting and now acting better. She talked about feeling upset with her mom for not always respecting her boundaries. Karine shared she has been working on her authenticity and confidence in her voice at work and with friends. During this session, this clinician used the following therapeutic modalities: Client-centered Therapy, Mindfulness-based Strategies, and Supportive Psychotherapy    Substance Abuse was not addressed during this session. If the client is diagnosed with a co-occurring substance use disorder, please indicate any changes in the frequency or amount of use: . Stage of change for addressing substance use diagnoses: No substance use/Not applicable    ASSESSMENT:  Karine Dan presents with a Euthymic/ normal mood. her affect is Normal range and intensity, which is congruent, with her mood and the content of the session. The client has made progress on their goals. Karine Dan presents with a none risk of suicide, none risk of self-harm, and none risk of harm to others. For any risk assessment that surpasses a "low" rating, a safety plan must be developed. A safety plan was indicated: no  If yes, describe in detail     PLAN: Between sessions, Karine Dan will utilize assertive communication as needed with family, continue to address inner critic as needed.  At the next session, the therapist will use Client-centered Therapy, Cognitive Behavioral Therapy, and Supportive Psychotherapy to address interpersonal effectiveness, inner critic. Behavioral Health Treatment Plan and Discharge Planning: Hioplito Reynolds is aware of and agrees to continue to work on their treatment plan. They have identified and are working toward their discharge goals. yes    Visit start and stop times:    11/22/23  Start Time: 1800  Stop Time: 1848  Total Visit Time: 48 minutes  Virtual Regular Visit    Verification of patient location:    Patient is located at Home in the following state in which I hold an active license [place]      Assessment/Plan:    Problem List Items Addressed This Visit       Adjustment disorder - Primary       Goals addressed in session: Goal 1          Reason for visit is No chief complaint on file. Encounter provider Iron Gil LCSW    Provider located at [address] [place] [id number] [phone]      Recent Visits  No visits were found meeting these conditions. Showing recent visits within past 7 days and meeting all other requirements  Today's Visits  Date Type Provider Dept   11/22/23 Telemedicine Iron Gil LCSW [facility]   Showing today's visits and meeting all other requirements  Future Appointments  No visits were found meeting these conditions. Showing future appointments within next 150 days and meeting all other requirements       The patient was identified by name and date of birth. Karine Dan was informed that this is a telemedicine visit and that the visit is being conducted throughthe [employer] platform. She agrees to proceed. .  My office door was closed. No one else was in the room. She acknowledged consent and understanding of privacy and security of the video platform. The patient has agreed to participate and understands they can discontinue the visit at any time. Patient is aware this is a billable service.      Subjective  Karine Rizvi is a 39 y.o. female Karine appeared calm and cooperative and was able to engage in open communication during session. Karine and therapist talked about her frequency of therapy and decided to reduce frequency to monthly starting in January . HPI     Past Medical History:   Diagnosis Date    Abnormal Pap smear of cervix     Allergic rhinitis     last assessed-2012    Anemia     Chronic pain syndrome 2020    SANDEEP III (cervical intraepithelial neoplasia grade III) with severe dysplasia     LEEP cone biopsy for SANDEEP-3 04. Done at the hospital    Gestational diabetes     antepartum condition or prior complicated delivery    Impacted cerumen of both ears     last assessed-2012    Low back pain     Oligomenorrhea     Spinal stenosis     Spinal stenosis of lumbar region without neurogenic claudication 2019    Vaginal candidiasis     last assessed-2013    Varicella     chickenpox as a child    Vertigo     last assessed-2013       Past Surgical History:   Procedure Laterality Date    CERVICAL BIOPSY  W/ LOOP ELECTRODE EXCISION  2004    Pathololgy with SANDEEP-2 to 3, with free margins but dysplasia extended to within 1mm of endocervical margin. 2004     SECTION, LOW TRANSVERSE  2009    Primary low transverse  for arrest in the active phase at 4 cm with unengaged vertex despite hours of labor    COLPOSCOPY W/ BIOPSY / CURETTAGE  2003    EPIDURAL BLOCK INJECTION N/A 2020    Procedure: L4-L5 LUMBAR EPIDURAL STEROID INJECTION (51510);   Surgeon: Rajani Helm MD;  Location: [facility] MAIN OR;  Service: Pain Management     FOOT SURGERY Right     TOOTH EXTRACTION         Current Outpatient Medications   Medication Sig Dispense Refill    carbamide peroxide (Debrox) 6.5 % otic solution  (Patient not taking: Reported on 3/24/2023)      fluticasone (FLONASE) 50 mcg/act nasal spray 1 spray into each nostril daily      loratadine (Alavert) 10 MG dissolvable tablet triamcinolone (KENALOG) 0.1 % cream Apply topically 2 (two) times a day 30 g 0     No current facility-administered medications for this visit. No Known Allergies    Review of Systems    Video Exam    There were no vitals filed for this visit.     Physical Exam

## 2023-12-06 ENCOUNTER — TELEMEDICINE (OUTPATIENT)
Dept: PSYCHIATRY | Facility: CLINIC | Age: 43
End: 2023-12-06
Payer: COMMERCIAL

## 2023-12-06 DIAGNOSIS — F43.20 ADJUSTMENT DISORDER, UNSPECIFIED TYPE: Primary | ICD-10-CM

## 2023-12-06 PROCEDURE — 90834 PSYTX W PT 45 MINUTES: CPT

## 2023-12-06 NOTE — PSYCH
Behavioral Health Psychotherapy Progress Note    Psychotherapy Provided: Individual Psychotherapy     1. Adjustment disorder, unspecified type            Goals addressed in session: Goal 1     DATA: Karine cutler Thanksgiving was "quiet" because her brother did not show up. She talked about her feelings regarding her brother and her mother's dynamics and her brother acting disrespectfully towards the family. She talked about how she enjoys the holidays including putting up decorations, baking,shopping and going to [address] She shared she had to make limits with a friend who wants to bake cookies for many hours. 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 Therapist encouraged Karine to speak her mind during work situations where she would like to put her opinion out there. During this session, this clinician used the following therapeutic modalities: Client-centered Therapy    Substance Abuse was not addressed during this session. If the client is diagnosed with a co-occurring substance use disorder, please indicate any changes in the frequency or amount of use: . Stage of change for addressing substance use diagnoses: No substance use/Not applicable    ASSESSMENT:  Karine Dan presents with a Euthymic/ normal mood. her affect is Normal range and intensity, which is congruent, with her mood and the content of the session. The client has made progress on their goals. Karine Dan presents with a none risk of suicide, none risk of self-harm, and none risk of harm to others. For any risk assessment that surpasses a "low" rating, a safety plan must be developed. A safety plan was indicated: no  If yes, describe in detail     PLAN: Between sessions, Mary Daiz will speak her mind in work situations and with friends as needed.  At the next session, the therapist will use Client-centered Therapy to address interpersonal effectiveness, assertiveness. Behavioral Health Treatment Plan and Discharge Planning: Carmenza Bush is aware of and agrees to continue to work on their treatment plan. They have identified and are working toward their discharge goals. yes    Visit start and stop times:    12/06/23  Start Time: 1800  Stop Time: 1852  Total Visit Time: 52 minutes  Virtual Regular Visit    Verification of patient location:    Patient is located at Home in the following state in which I hold an active license [place]      Assessment/Plan:    Problem List Items Addressed This Visit       Adjustment disorder - Primary       Goals addressed in session: Goal 1          Reason for visit is No chief complaint on file. Encounter provider Sadie Lopez LCSW    Provider located at [address] [place] [id number] [phone]      Recent Visits  No visits were found meeting these conditions. Showing recent visits within past 7 days and meeting all other requirements  Today's Visits  Date Type Provider Dept   12/06/23 Telemedicine Sadie Lopez LCSW [facility]   Showing today's visits and meeting all other requirements  Future Appointments  No visits were found meeting these conditions. Showing future appointments within next 150 days and meeting all other requirements       The patient was identified by name and date of birth. Karine Dan was informed that this is a telemedicine visit and that the visit is being conducted throughthe [employer] platform. She agrees to proceed. .  My office door was closed. No one else was in the room. She acknowledged consent and understanding of privacy and security of the video platform. The patient has agreed to participate and understands they can discontinue the visit at any time.     Patient is aware this is a billable service. Subjective  Karine ADRIANA Abad is a 39 y.o. female Karine appeared calm and cooperative and was able to engage in open communication during session . HPI     Past Medical History:   Diagnosis Date    Abnormal Pap smear of cervix     Allergic rhinitis     last assessed-2012    Anemia     Chronic pain syndrome 2020    SANDEEP III (cervical intraepithelial neoplasia grade III) with severe dysplasia     LEEP cone biopsy for SANDEEP-3 04. Done at the hospital    Gestational diabetes     antepartum condition or prior complicated delivery    Impacted cerumen of both ears     last assessed-2012    Low back pain     Oligomenorrhea     Spinal stenosis     Spinal stenosis of lumbar region without neurogenic claudication 2019    Vaginal candidiasis     last assessed-2013    Varicella     chickenpox as a child    Vertigo     last assessed-2013       Past Surgical History:   Procedure Laterality Date    CERVICAL BIOPSY  W/ LOOP ELECTRODE EXCISION  2004    Pathololgy with SANDEEP-2 to 3, with free margins but dysplasia extended to within 1mm of endocervical margin. 2004     SECTION, LOW TRANSVERSE  2009    Primary low transverse  for arrest in the active phase at 4 cm with unengaged vertex despite hours of labor    COLPOSCOPY W/ BIOPSY / CURETTAGE  2003    EPIDURAL BLOCK INJECTION N/A 2020    Procedure: L4-L5 LUMBAR EPIDURAL STEROID INJECTION (01948);   Surgeon: Adriana Padilla MD;  Location: [facility] MAIN OR;  Service: Pain Management     FOOT SURGERY Right     TOOTH EXTRACTION         Current Outpatient Medications   Medication Sig Dispense Refill    carbamide peroxide (Debrox) 6.5 % otic solution  (Patient not taking: Reported on 3/24/2023)      fluticasone (FLONASE) 50 mcg/act nasal spray 1 spray into each nostril daily      loratadine (Alavert) 10 MG dissolvable tablet       triamcinolone (KENALOG) 0.1 % cream Apply topically 2 (two) times a day 30 g 0     No current facility-administered medications for this visit. No Known Allergies    Review of Systems    Video Exam    There were no vitals filed for this visit.     Physical Exam

## 2023-12-15 DIAGNOSIS — Z00.6 ENCOUNTER FOR EXAMINATION FOR NORMAL COMPARISON OR CONTROL IN CLINICAL RESEARCH PROGRAM: ICD-10-CM

## 2023-12-19 ENCOUNTER — OFFICE VISIT (OUTPATIENT)
Dept: URGENT CARE | Facility: MEDICAL CENTER | Age: 43
End: 2023-12-19
Payer: COMMERCIAL

## 2023-12-19 ENCOUNTER — TELEPHONE (OUTPATIENT)
Age: 43
End: 2023-12-19

## 2023-12-19 VITALS
WEIGHT: 181 LBS | BODY MASS INDEX: 30.9 KG/M2 | DIASTOLIC BLOOD PRESSURE: 60 MMHG | SYSTOLIC BLOOD PRESSURE: 126 MMHG | OXYGEN SATURATION: 96 % | RESPIRATION RATE: 18 BRPM | TEMPERATURE: 100.6 F | HEART RATE: 98 BPM | HEIGHT: 64 IN

## 2023-12-19 DIAGNOSIS — R68.89 FLU-LIKE SYMPTOMS: Primary | ICD-10-CM

## 2023-12-19 PROCEDURE — 87636 SARSCOV2 & INF A&B AMP PRB: CPT | Performed by: STUDENT IN AN ORGANIZED HEALTH CARE EDUCATION/TRAINING PROGRAM

## 2023-12-19 PROCEDURE — 99213 OFFICE O/P EST LOW 20 MIN: CPT | Performed by: STUDENT IN AN ORGANIZED HEALTH CARE EDUCATION/TRAINING PROGRAM

## 2023-12-19 RX ORDER — OSELTAMIVIR PHOSPHATE 75 MG/1
75 CAPSULE ORAL EVERY 12 HOURS SCHEDULED
Qty: 10 CAPSULE | Refills: 0 | Status: SHIPPED | OUTPATIENT
Start: 2023-12-19 | End: 2023-12-24

## 2023-12-19 NOTE — PATIENT INSTRUCTIONS
" Influenza  What is influenza?  Influenza, also called the flu, is a viral infection of the nose, throat, and lungs. It is often confused with other illnesses, especially the common cold. The flu is more severe than a cold, usually comes on suddenly, and is caused by a different virus. In 2009, a new strain of flu called H1N1 or \"swine flu\" made many people sick.     What are some symptoms of the flu?  The most common symptoms are chills, fever, headache, sore throat, cough, runny or stuffy nose, muscle aches, weakness, and fatigue. Some people may have vomiting and diarrhea.   Most people who have the flu get better within a few days to a couple of weeks. In some people, the flu causes other problems, such as dehydration, ear infections, sinus infections, and pneumonia. Serious complications from the flu can happen at any age, but they are more likely in children younger than two years and in adults 65 years and older. The flu can also make certain health problems worse, such as asthma, diabetes, and heart problems.    How is it spread?  Flu viruses are spread through body fluids from an infected person, such as through coughs or sneezes. They are also spread by shaking hands or touching objects that have been handled by someone with the flu, such as doorknobs, grocery cart handles, money, elevator buttons, remote controls, telephones, and computer keyboards.  Flu symptoms usually start about two days after the virus enters the body. This means you can spread the virus to others even before you know you are sick. People with the flu are contagious for up to 24 hours after their fever ends. Some people can be infected with the flu virus but have no symptoms. These people can still spread the virus to others.    What can I do to prevent the flu?   Get a flu shot. This year's shot contains the 2009 H1N1 virus plus two other flu viruses. Everyone six months and older should get a flu shot this year.   Stay away from " people who are sick.    Wash your hands often. Use soap and hot water, and wash for at least 15 seconds. Carry alcohol-based hand  with you for times when you're away from a sink.    Do not touch your eyes, nose, or mouth. If you have touched something that has been handled by someone who is sick, your hands may have the flu virus on them.    Take care of yourself. Get plenty of sleep and exercise, drink a lot of water, and eat a healthy diet, including at least five servings of fruits and vegetables each day.     What should I do if I get the flu?  If you have symptoms of the flu, do not go to work or school. Stay home and get plenty of rest, drink a lot of water, and do not smoke or drink alcohol. Most people who get the flu do not need to see a doctor. Very young children, older adults, and people with certain medical conditions are more likely to get very sick from the flu. These people may need to see a doctor.   Go to the emergency room right away if you have any of the following symptoms:   In children    Fast breathing or trouble breathing    Bluish skin color    Trouble drinking enough fluids    Trouble waking up    Irritability    Fever with a rash    Symptoms that get better, but then return with fever and worse cough   In adults    Trouble breathing    Chest or stomach pain    Dizziness    Confusion    Severe vomiting    Symptoms that get better, but then return with fever and worse cough     Do I need medicine?   Over-the-counter medicine, such as acetaminophen (one brand: Tylenol) or ibuprofen (one brand: Motrin), can help with fever and muscle aches. Children and teenagers should not take aspirin because it can cause a rare but serious liver disease.     Downloaded from the American Family [employer]ician Web site at www.aafp.org/afp. Copyright © 2010 American Academy [employer]. For the private, noncommercial use of one individual user of the Web site. All other rights reserved. Contact  copyrights@aafp.org for copyright questions and/or permission requests.      This handout is provided to you by your family doctor and the American Academy [employer]. Other health-related information is available from the AAFP online at http://familydoctor.org.   This information provides a general overview and may not apply to everyone. Talk to your family doctor to find out if this information applies to you and to get more information on this subject. Copyright © 2010 American [employer]. Individuals may photocopy this material for their own personal reference, and physicians may photocopy for use with their own patients. Written permission is required for all other uses, including electronic uses.   Page 2 of 2

## 2023-12-19 NOTE — LETTER
[facility] [place]  [address]  [facility] [address]  Dept: [phone]    December 19, 2023    Patient: Karine Dan  YOB: 1980    Karine Dan was seen and evaluated at our [facility]. Please note if Covid and Flu tests are negative, they may return to work when fever free for 24 hours without the use of a fever reducing agent. If Covid or Flu test is positive, they may return to work on 12/25/2023, as this is 5 days from the onset of symptoms. Upon return, they must then adhere to strict masking for an additional 5 days.    Sincerely,    Edith Recinos, DO

## 2023-12-19 NOTE — PROGRESS NOTES
[facility]        NAME: Karine Dan is a 43 y.o. female  : 1980    MRN: [id number]  DATE: 2023  TIME: 3:49 PM    Assessment and Orders   Flu-like symptoms [R68.89]  1. Flu-like symptoms  Covid/Flu-Office Collect            Plan and Discussion      Symptoms and exam consistent with influenza.  Will start Tamiflu today. Patient to check Mychart in the next 24-48 hrs. If flu A and B negative, discontinue Tamiflu and proceed with OTC medications only.      Discussed ED precautions including (but not limited to)  Difficultly breathing or shortness of breath  Chest pain  Acutely worsening symptoms.     Risks and benefits discussed. Patient understands and agrees with the plan.     Follow up with PCP.     Chief Complaint     Chief Complaint   Patient presents with    Cold Like Symptoms     Pt. With cough, body aches, and fever that began today.          History of Present Illness       Son tested positive for the flu.     Fever  This is a new problem. The current episode started today. The problem has been gradually worsening. Associated symptoms include congestion, coughing, fatigue, a fever, headaches and myalgias. Pertinent negatives include no nausea, sore throat or vomiting.       Review of Systems   Review of Systems   Constitutional:  Positive for fatigue and fever.   HENT:  Positive for congestion. Negative for sore throat.    Respiratory:  Positive for cough.    Gastrointestinal:  Negative for nausea and vomiting.   Musculoskeletal:  Positive for myalgias.   Neurological:  Positive for headaches.         Current Medications       Current Outpatient Medications:     fluticasone (FLONASE) 50 mcg/act nasal spray, 1 spray into each nostril daily, Disp: , Rfl:     loratadine (Alavert) 10 MG dissolvable tablet, , Disp: , Rfl:     triamcinolone (KENALOG) 0.1 % cream, Apply topically 2 (two) times a day, Disp: 30 g, Rfl: 0    carbamide peroxide (Debrox) 6.5 % otic solution, , Disp: ,  Rfl:     Current Allergies     Allergies as of 2023    (No Known Allergies)            The following portions of the patient's history were reviewed and updated as appropriate: allergies, current medications, past family history, past medical history, past social history, past surgical history and problem list.     Past Medical History:   Diagnosis Date    Abnormal Pap smear of cervix     Allergic rhinitis     last assessed-2012    Anemia     Chronic pain syndrome 2020    SANDEEP III (cervical intraepithelial neoplasia grade III) with severe dysplasia     LEEP cone biopsy for SANDEEP-3 04.  Done at the hospital    Gestational diabetes     antepartum condition or prior complicated delivery    Impacted cerumen of both ears     last assessed-2012    Low back pain     Oligomenorrhea     Spinal stenosis     Spinal stenosis of lumbar region without neurogenic claudication 2019    Vaginal candidiasis     last assessed-2013    Varicella     chickenpox as a child    Vertigo     last assessed-2013       Past Surgical History:   Procedure Laterality Date    CERVICAL BIOPSY  W/ LOOP ELECTRODE EXCISION  2004    Pathololgy with SANDEEP-2 to 3, with free margins but dysplasia extended to within 1mm of endocervical margin.  2004     SECTION, LOW TRANSVERSE  2009    Primary low transverse  for arrest in the active phase at 4 cm with unengaged vertex despite hours of labor    COLPOSCOPY W/ BIOPSY / CURETTAGE  2003    EPIDURAL BLOCK INJECTION N/A 2020    Procedure: L4-L5 LUMBAR EPIDURAL STEROID INJECTION (55857);  Surgeon: Elke Aguilar MD;  Location: [facility] MAIN OR;  Service: Pain Management     FOOT SURGERY Right     TOOTH EXTRACTION         Family History   Problem Relation Age of Onset    Skin cancer Mother     Hypertension Father     Kidney failure Father     Hypertension Maternal Grandmother     Lung cancer Maternal Grandmother     Cancer Maternal  "Grandfather         bladder    Hypertension Maternal Grandfather     Lung cancer Paternal Grandmother     Heart disease Paternal Grandfather     No Known Problems Brother     No Known Problems Son     No Known Problems Maternal Aunt     No Known Problems Maternal Aunt     No Known Problems Paternal Aunt     No Known Problems Paternal Uncle     No Known Problems Paternal Uncle     No Known Problems Paternal Uncle          Medications have been verified.        Objective   /60   Pulse 98   Temp (!) 100.6 °F (38.1 °C)   Resp 18   Ht 5' 4\" (1.626 m)   Wt 82.1 kg (181 lb)   SpO2 96%   BMI 31.07 kg/m²   No LMP recorded. Patient has had an implant.       Physical Exam     Physical Exam  Constitutional:       General: She is not in acute distress.     Appearance: She is not ill-appearing or toxic-appearing.   HENT:      Head: Normocephalic and atraumatic.      Right Ear: There is impacted cerumen.      Left Ear: There is impacted cerumen.      Nose: Congestion present.      Comments: Boggy nasal turbinates     Mouth/Throat:      Pharynx: Posterior oropharyngeal erythema present.      Comments: Cobblestone appearance in posterior pharynx  Cardiovascular:      Rate and Rhythm: Normal rate and regular rhythm.   Pulmonary:      Effort: Pulmonary effort is normal. No respiratory distress.      Breath sounds: No wheezing.   Neurological:      General: No focal deficit present.      Mental Status: She is alert and oriented to person, place, and time.   Psychiatric:         Mood and Affect: Mood normal.         Behavior: Behavior normal.         Thought Content: Thought content normal.         Judgment: Judgment normal.               Edith Recinos DO       "

## 2023-12-20 LAB
FLUAV RNA RESP QL NAA+PROBE: POSITIVE
FLUBV RNA RESP QL NAA+PROBE: NEGATIVE
SARS-COV-2 RNA RESP QL NAA+PROBE: NEGATIVE

## 2024-01-10 ENCOUNTER — TELEMEDICINE (OUTPATIENT)
Dept: PSYCHIATRY | Facility: CLINIC | Age: 44
End: 2024-01-10
Payer: COMMERCIAL

## 2024-01-10 DIAGNOSIS — F43.20 ADJUSTMENT DISORDER, UNSPECIFIED TYPE: Primary | ICD-10-CM

## 2024-01-10 PROCEDURE — 90834 PSYTX W PT 45 MINUTES: CPT

## 2024-01-10 NOTE — PSYCH
Behavioral Health Psychotherapy Progress Note    Psychotherapy Provided: Individual Psychotherapy     1. Adjustment disorder, unspecified type            Goals addressed in session: Goal 1     DATA: Karine shared she has been doing well and that her holidays went well even though she got sick with the flu.  She shared that her mom came over while Karine was in therapy and she was nervous her mother overheard her while she was talking but she had not.  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  She shared she has some anxiety at times including thinking of things not going well including arguments with her mother and things at work.  She shared she does want to continue therapy at least one more time in February to see how things are going with her.  She shared some thoughts about her brother, that he needs a double hip replacement.  She shared she gets frustrated with him for disappointing her mother and for generating drama.  Karine talked about working on her catastrophizing and imposter syndrome over the next month by being aware of her thoughts and feelings and challenging negative thinking  During this session, this clinician used the following therapeutic modalities: Client-centered Therapy and Mindfulness-based Strategies    Substance Abuse was not addressed during this session. If the client is diagnosed with a co-occurring substance use disorder, please indicate any changes in the frequency or amount of use: . Stage of change for addressing substance use diagnoses: No substance use/Not applicable    ASSESSMENT:  Karine Dan presents with a Euthymic/ normal mood.     her affect is Normal range and intensity, which is congruent, with her mood and the content of the session. The client has made progress on their goals.     Karine Dan presents with a none risk of suicide, none risk of self-harm, and none risk of harm to  "others.    For any risk assessment that surpasses a \"low\" rating, a safety plan must be developed.    A safety plan was indicated: no  If yes, describe in detail     PLAN: Between sessions, Karine Dan will counter negative thinking with challenging the thoughts and balancing them as needed. At the next session, the therapist will use Client-centered Therapy, Cognitive Behavioral Therapy, and Supportive Psychotherapy to address anxiety.    Behavioral Health Treatment Plan and Discharge Planning: Karine Dan is aware of and agrees to continue to work on their treatment plan. They have identified and are working toward their discharge goals. yes    Visit start and stop times:    01/10/24  Start Time: 1800  Stop Time: 1852  Total Visit Time: 52 minutes  Virtual Regular Visit    Verification of patient location:    Patient is located at Home in the following state in which I hold an active license [place]      Assessment/Plan:    Problem List Items Addressed This Visit       Adjustment disorder - Primary       Goals addressed in session: Goal 1          Reason for visit is No chief complaint on file.       Encounter provider Cherie Finnegan LCSW    Provider [facility] [address]  [facility] [place] [address] [phone]      Recent Visits  No visits were found meeting these conditions.  Showing recent visits within past 7 days and meeting all other requirements  Today's Visits  Date Type Provider Dept   01/10/24 Telemedicine Cherie Finnegan LCSW  [facility]iatric [facility]   Showing today's visits and meeting all other requirements  Future Appointments  No visits were found meeting these conditions.  Showing future appointments within next 150 days and meeting all other requirements       The patient was identified by name and date of birth. Karine Dan was informed that this is a telemedicine visit and that the " visit is being conducted throughthe [employer] platform. She agrees to proceed..  My office door was closed. No one else was in the room.  She acknowledged consent and understanding of privacy and security of the video platform. The patient has agreed to participate and understands they can discontinue the visit at any time.    Patient is aware this is a billable service.     Subjective  Karine Dan is a 43 y.o. female Karine appeared calm and cooperative and was able to engage in open communication during session .      HPI     Past Medical History:   Diagnosis Date    Abnormal Pap smear of cervix     Allergic rhinitis     last assessed-2012    Anemia     Chronic pain syndrome 2020    SANDEEP III (cervical intraepithelial neoplasia grade III) with severe dysplasia     LEEP cone biopsy for SANDEEP-3 04.  Done at the hospital    Gestational diabetes     antepartum condition or prior complicated delivery    Impacted cerumen of both ears     last assessed-2012    Low back pain     Oligomenorrhea     Spinal stenosis     Spinal stenosis of lumbar region without neurogenic claudication 2019    Vaginal candidiasis     last assessed-2013    Varicella     chickenpox as a child    Vertigo     last assessed-2013       Past Surgical History:   Procedure Laterality Date    CERVICAL BIOPSY  W/ LOOP ELECTRODE EXCISION  2004    Pathololgy with SANDEEP-2 to 3, with free margins but dysplasia extended to within 1mm of endocervical margin.  2004     SECTION, LOW TRANSVERSE  2009    Primary low transverse  for arrest in the active phase at 4 cm with unengaged vertex despite hours of labor    COLPOSCOPY W/ BIOPSY / CURETTAGE  2003    EPIDURAL BLOCK INJECTION N/A 2020    Procedure: L4-L5 LUMBAR EPIDURAL STEROID INJECTION (87061);  Surgeon: Elke Aguilar MD;  Location: [facility] MAIN OR;  Service: Pain Management     FOOT SURGERY Right     TOOTH EXTRACTION          Current Outpatient Medications   Medication Sig Dispense Refill    carbamide peroxide (Debrox) 6.5 % otic solution  (Patient not taking: Reported on 3/24/2023)      fluticasone (FLONASE) 50 mcg/act nasal spray 1 spray into each nostril daily      loratadine (Alavert) 10 MG dissolvable tablet       triamcinolone (KENALOG) 0.1 % cream Apply topically 2 (two) times a day 30 g 0     No current facility-administered medications for this visit.        No Known Allergies    Review of Systems    Video Exam    There were no vitals filed for this visit.    Physical Exam

## 2024-01-29 ENCOUNTER — APPOINTMENT (OUTPATIENT)
Dept: LAB | Facility: CLINIC | Age: 44
End: 2024-01-29

## 2024-01-29 DIAGNOSIS — Z00.6 ENCOUNTER FOR EXAMINATION FOR NORMAL COMPARISON OR CONTROL IN CLINICAL RESEARCH PROGRAM: ICD-10-CM

## 2024-01-29 PROCEDURE — 36415 COLL VENOUS BLD VENIPUNCTURE: CPT

## 2024-02-07 ENCOUNTER — TELEMEDICINE (OUTPATIENT)
Dept: PSYCHIATRY | Facility: CLINIC | Age: 44
End: 2024-02-07
Payer: COMMERCIAL

## 2024-02-07 DIAGNOSIS — F43.20 ADJUSTMENT DISORDER, UNSPECIFIED TYPE: Primary | ICD-10-CM

## 2024-02-07 PROCEDURE — 90834 PSYTX W PT 45 MINUTES: CPT

## 2024-02-07 NOTE — BH TREATMENT PLAN
Outpatient Behavioral Health Psychotherapy Treatment Plan    Karine Dan  1980     Date of Initial Psychotherapy Assessment: 2/1/2023   Date of Current Treatment Plan: 02/07/24  Treatment Plan Target Date: 8/16/2024  Treatment Plan Expiration Date: 1/31/2024    Diagnosis:   No diagnosis found.      Area(s) of Need: Want to take reflection about my parents and balance honoring my feelings as well as being a parent.    Long Term Goal 1 (in the client's own words): I want to honor and have my own feelings while balancing them in my responsibilities as parent, wife and friend.    Stage of Change: Contemplation    Target Date for completion: 8/16/2024     Anticipated therapeutic modalities: Supportive therapy, CBT, EMDR     People identified to complete this goal: sherly Milton      Objective 1: (identify the means of measuring success in meeting the objective): Therapist will engage Karine in supportive therapy as well as CBT and EMDR to process past events and relationships to navigate current relationships and issues.  Therapist will encourage Karine to utilize coping skills for improving her relationships and emotions. Karine will utilize coping skills consistently in 8 out of 10 situations.     Update:  Karine shared she is using her coping skills 95% of the time after some reflection.  She will work on paying attention to herself and respond rather than react.       I am currently under the care of a [facility] psychiatric provider: no    My [facility] psychiatric provider is: n/a    I am currently taking psychiatric medications: No    I feel that I will be ready for discharge from mental health care when I reach the following (measurable goal/objective): When I can successfully navigate my relationships and feel emotionally regulated in at least 8 out of 10 situations    For children and adults who have a legal guardian:   Has there been any change to custody orders and/or guardianship status? NA. If  yes, attach updated documentation.    I have updated my Crisis Plan and have been offered a copy of this plan    [facility] Diagnosis and Treatment Plan explained to Karine Dan acknowledges an understanding of their diagnosis. Karine Dan agrees to this treatment plan.    I have been offered a copy of this Treatment Plan. Yes    Karine Dan, 1980, actively participated in the review and update of this treatment plan during a virtual session, using the [employer] platform.   Karine Dan  provided verbal consent on 2/7/2024 at 6:43 PM. The treatment plan was transcribed into the Electronic Health Record at a later time.

## 2024-02-07 NOTE — PSYCH
"Behavioral Health Psychotherapy Progress Note    Psychotherapy Provided: Individual Psychotherapy     No diagnosis found.    Goals addressed in session: Goal 1     DATA:   Karine shared she is doing well and went to the gym.  She shared she has been journaling recently and that she has realized she wants to be strong as she gets older instead of focusing on her looks. She shared she has been thinking about her work in terms of her imposter syndrome and trusting her instincts.  She examined her thoughts and feelings about her work.  Karine and therapist updated her treatment plan, and therapist taught Karine the STOP skill for moments when she feels her emotions are overwhelming her.  During this session, this clinician used the following therapeutic modalities: Client-centered Therapy and Dialectical Behavior Therapy    Substance Abuse was not addressed during this session. If the client is diagnosed with a co-occurring substance use disorder, please indicate any changes in the frequency or amount of use: . Stage of change for addressing substance use diagnoses: No substance use/Not applicable    ASSESSMENT:  Karine Dan presents with a Euthymic/ normal mood.     her affect is Normal range and intensity, which is congruent, with her mood and the content of the session. The client has made progress on their goals.     Karine Dan presents with a none risk of suicide, none risk of self-harm, and none risk of harm to others.    For any risk assessment that surpasses a \"low\" rating, a safety plan must be developed.    A safety plan was indicated: no  If yes, describe in detail     PLAN: Between sessions, Karine Dan will utilize STOP skill as needed. At the next session, the therapist will use Client-centered Therapy and Dialectical Behavior Therapy to address negative thinking, interpersonal skills.    Behavioral Health Treatment Plan and Discharge Planning: Karine Dan is aware of and agrees to " continue to work on their treatment plan. They have identified and are working toward their discharge goals. yes    Visit start and stop times:    02/07/24  Start Time: 1800  Stop Time: 1852  Total Visit Time: 52 minutes  Virtual Regular Visit    Verification of patient location:    Patient is located at Home in the following state in which I hold an active license [place]      Assessment/Plan:    Problem List Items Addressed This Visit    None      Goals addressed in session: Goal 1          Reason for visit is No chief complaint on file.       Encounter provider Cherie Finnegan LCSW    Provider [facility] [address]  [facility] [place] [address] [phone]      Recent Visits  No visits were found meeting these conditions.  Showing recent visits within past 7 days and meeting all other requirements  Today's Visits  Date Type Provider Dept   02/07/24 Telemedicine Cherie Finnegan LCSW  [facility]   Showing today's visits and meeting all other requirements  Future Appointments  No visits were found meeting these conditions.  Showing future appointments within next 150 days and meeting all other requirements       The patient was identified by name and date of birth. Karine Dan was informed that this is a telemedicine visit and that the visit is being conducted throughthe [employer] platform. She agrees to proceed..  My office door was closed. No one else was in the room.  She acknowledged consent and understanding of privacy and security of the video platform. The patient has agreed to participate and understands they can discontinue the visit at any time.    Patient is aware this is a billable service.     Subjective  Karine Dan is a 43 y.o. female  .      HPI     Past Medical History:   Diagnosis Date    Abnormal Pap smear of cervix     Allergic rhinitis     last assessed-12/31/2012    Anemia      Chronic pain syndrome 2020    SANDEEP III (cervical intraepithelial neoplasia grade III) with severe dysplasia     LEEP cone biopsy for SANDEEP-3 04.  Done at the hospital    Gestational diabetes     antepartum condition or prior complicated delivery    Impacted cerumen of both ears     last assessed-2012    Low back pain     Oligomenorrhea     Spinal stenosis     Spinal stenosis of lumbar region without neurogenic claudication 2019    Vaginal candidiasis     last assessed-2013    Varicella     chickenpox as a child    Vertigo     last assessed-2013       Past Surgical History:   Procedure Laterality Date    CERVICAL BIOPSY  W/ LOOP ELECTRODE EXCISION  2004    Pathololgy with SANDEEP-2 to 3, with free margins but dysplasia extended to within 1mm of endocervical margin.  2004     SECTION, LOW TRANSVERSE  2009    Primary low transverse  for arrest in the active phase at 4 cm with unengaged vertex despite hours of labor    COLPOSCOPY W/ BIOPSY / CURETTAGE  2003    EPIDURAL BLOCK INJECTION N/A 2020    Procedure: L4-L5 LUMBAR EPIDURAL STEROID INJECTION (05229);  Surgeon: Elke Aguilar MD;  Location: [facility] MAIN OR;  Service: Pain Management     FOOT SURGERY Right     TOOTH EXTRACTION         Current Outpatient Medications   Medication Sig Dispense Refill    carbamide peroxide (Debrox) 6.5 % otic solution  (Patient not taking: Reported on 3/24/2023)      fluticasone (FLONASE) 50 mcg/act nasal spray 1 spray into each nostril daily      loratadine (Alavert) 10 MG dissolvable tablet       triamcinolone (KENALOG) 0.1 % cream Apply topically 2 (two) times a day 30 g 0     No current facility-administered medications for this visit.        No Known Allergies    Review of Systems    Video Exam    There were no vitals filed for this visit.    Physical Exam

## 2024-02-22 LAB
APOB+LDLR+PCSK9 GENE MUT ANL BLD/T: NOT DETECTED
BRCA1+BRCA2 DEL+DUP + FULL MUT ANL BLD/T: NOT DETECTED
MLH1+MSH2+MSH6+PMS2 GN DEL+DUP+FUL M: NOT DETECTED

## 2024-03-12 ENCOUNTER — APPOINTMENT (OUTPATIENT)
Dept: RADIOLOGY | Facility: CLINIC | Age: 44
End: 2024-03-12
Payer: COMMERCIAL

## 2024-03-12 ENCOUNTER — OFFICE VISIT (OUTPATIENT)
Dept: OBGYN CLINIC | Facility: CLINIC | Age: 44
End: 2024-03-12
Payer: COMMERCIAL

## 2024-03-12 VITALS
HEART RATE: 69 BPM | BODY MASS INDEX: 30.9 KG/M2 | SYSTOLIC BLOOD PRESSURE: 120 MMHG | DIASTOLIC BLOOD PRESSURE: 80 MMHG | HEIGHT: 64 IN | WEIGHT: 181 LBS

## 2024-03-12 DIAGNOSIS — M54.41 ACUTE RIGHT-SIDED LOW BACK PAIN WITH RIGHT-SIDED SCIATICA: Primary | ICD-10-CM

## 2024-03-12 DIAGNOSIS — M54.50 LOW BACK PAIN, UNSPECIFIED BACK PAIN LATERALITY, UNSPECIFIED CHRONICITY, UNSPECIFIED WHETHER SCIATICA PRESENT: ICD-10-CM

## 2024-03-12 PROCEDURE — 72100 X-RAY EXAM L-S SPINE 2/3 VWS: CPT

## 2024-03-12 PROCEDURE — 99203 OFFICE O/P NEW LOW 30 MIN: CPT | Performed by: ORTHOPAEDIC SURGERY

## 2024-03-12 NOTE — PROGRESS NOTES
Assessment/Plan:  1. Acute right-sided low back pain with right-sided sciatica  XR spine lumbar 2 or 3 views injury    Ambulatory referral to Physical Therapy          Jessy has lower back pain and hip pain which may be related to her underlying disc pathology however she has no obvious signs of radiculopathy or weakness today.  I recommended core strengthening and physical therapy as an appropriate treatment at this time.  She agreed with this plan.  We could consider further imaging if she does not improve or tolerate PT alone.    Subjective:   Karine Dan is a 44 y.o. female who presents to the office for evaluation for lower back pain.  She has been feeling pain into the right hip and down the right leg.  She has a history of lumbar disc herniation and prior epidural injections over 3 years ago.  She has a recent onset of pain in her lower back with no significant discomfort or rating pain down her leg.  She denies any recent injury.  She would like to avoid going to formal physical therapy      Review of Systems   Constitutional:  Negative for chills, fever and unexpected weight change.   HENT:  Negative for hearing loss, nosebleeds and sore throat.    Eyes:  Negative for pain, redness and visual disturbance.   Respiratory:  Negative for cough, shortness of breath and wheezing.    Cardiovascular:  Negative for chest pain, palpitations and leg swelling.   Gastrointestinal:  Negative for abdominal pain, nausea and vomiting.   Endocrine: Negative for polydipsia and polyuria.   Genitourinary:  Negative for dysuria and hematuria.   Musculoskeletal:         See HPI   Skin:  Negative for rash and wound.   Neurological:  Negative for dizziness, numbness and headaches.   Psychiatric/Behavioral:  Negative for decreased concentration and suicidal ideas. The patient is not nervous/anxious.          Past Medical History:   Diagnosis Date    Abnormal Pap smear of cervix     Allergic rhinitis     last assessed-12/31/2012     Anemia     Chronic pain syndrome 2020    SANDEEP III (cervical intraepithelial neoplasia grade III) with severe dysplasia     LEEP cone biopsy for SANDEEP-3 04.  Done at the hospital    Gestational diabetes     antepartum condition or prior complicated delivery    Impacted cerumen of both ears     last assessed-2012    Low back pain     Oligomenorrhea     Spinal stenosis     Spinal stenosis of lumbar region without neurogenic claudication 2019    Vaginal candidiasis     last assessed-2013    Varicella     chickenpox as a child    Vertigo     last assessed-2013       Past Surgical History:   Procedure Laterality Date    CERVICAL BIOPSY  W/ LOOP ELECTRODE EXCISION  2004    Pathololgy with SANDEEP-2 to 3, with free margins but dysplasia extended to within 1mm of endocervical margin.  2004     SECTION, LOW TRANSVERSE  2009    Primary low transverse  for arrest in the active phase at 4 cm with unengaged vertex despite hours of labor    COLPOSCOPY W/ BIOPSY / CURETTAGE  2003    EPIDURAL BLOCK INJECTION N/A 2020    Procedure: L4-L5 LUMBAR EPIDURAL STEROID INJECTION (85084);  Surgeon: Elke Aguilar MD;  Location: [facility] MAIN OR;  Service: Pain Management     FOOT SURGERY Right     TOOTH EXTRACTION         Family History   Problem Relation Age of Onset    Skin cancer Mother     Hypertension Father     Kidney failure Father     Hypertension Maternal Grandmother     Lung cancer Maternal Grandmother     Cancer Maternal Grandfather         bladder    Hypertension Maternal Grandfather     Lung cancer Paternal Grandmother     Heart disease Paternal Grandfather     No Known Problems Brother     No Known Problems Son     No Known Problems Maternal Aunt     No Known Problems Maternal Aunt     No Known Problems Paternal Aunt     No Known Problems Paternal Uncle     No Known Problems Paternal Uncle     No Known Problems Paternal Uncle        Social History      Occupational History    Not on file   Tobacco Use    Smoking status: Never    Smokeless tobacco: Never   Vaping Use    Vaping status: Never Used   Substance and Sexual Activity    Alcohol use: Yes     Alcohol/week: 2.0 standard drinks of alcohol     Types: 2 Glasses of wine per week    Drug use: No    Sexual activity: Yes     Partners: Male     Birth control/protection: I.U.D.         Current Outpatient Medications:     fluticasone (FLONASE) 50 mcg/act nasal spray, 1 spray into each nostril daily, Disp: , Rfl:     loratadine (Alavert) 10 MG dissolvable tablet, , Disp: , Rfl:     carbamide peroxide (Debrox) 6.5 % otic solution, , Disp: , Rfl:     triamcinolone (KENALOG) 0.1 % cream, Apply topically 2 (two) times a day (Patient not taking: Reported on 3/12/2024), Disp: 30 g, Rfl: 0    No Known Allergies    Objective:  Vitals:    03/12/24 1504   BP: 120/80   Pulse: 69            Back Exam     Tenderness   The patient is experiencing tenderness in the lumbar.    Range of Motion   Extension:  normal   Flexion:  normal     Muscle Strength   Right Quadriceps:  5/5   Left Quadriceps:  5/5   Right Hamstrings:  5/5   Left Hamstrings:  5/5     Tests   Straight leg raise right: negative  Straight leg raise left: negative    Other   Sensation: normal  Gait: normal             Physical Exam  Vitals and nursing note reviewed.   Constitutional:       Appearance: Normal appearance. She is well-developed.   HENT:      Head: Normocephalic and atraumatic.      Right Ear: External ear normal.      Left Ear: External ear normal.   Eyes:      General: No scleral icterus.     Extraocular Movements: Extraocular movements intact.      Conjunctiva/sclera: Conjunctivae normal.   Cardiovascular:      Rate and Rhythm: Normal rate.   Pulmonary:      Effort: Pulmonary effort is normal. No respiratory distress.   Musculoskeletal:      Cervical back: Normal range of motion and neck supple.      Lumbar back: Negative right straight leg raise test  and negative left straight leg raise test.      Comments: See Ortho exam   Skin:     General: Skin is warm and dry.   Neurological:      General: No focal deficit present.      Mental Status: She is alert and oriented to person, place, and time.   Psychiatric:         Behavior: Behavior normal.         I have personally reviewed pertinent films in PACS and my interpretation is as follows:  X-ray of the lumbar spine demonstrates a slight anterolisthesis of L4 on L5 which appears to be grade 1.  No other significant degenerative changes.      This document was created using speech voice recognition software.   Grammatical errors, random word insertions, pronoun errors, and incomplete sentences are an occasional consequence of this system due to software limitations, ambient noise, and hardware issues.   Any formal questions or concerns about content, text, or information contained within the body of this dictation should be directly addressed to the provider for clarification.

## 2024-03-20 ENCOUNTER — TELEMEDICINE (OUTPATIENT)
Dept: PSYCHIATRY | Facility: CLINIC | Age: 44
End: 2024-03-20
Payer: COMMERCIAL

## 2024-03-20 ENCOUNTER — OFFICE VISIT (OUTPATIENT)
Dept: FAMILY MEDICINE CLINIC | Facility: CLINIC | Age: 44
End: 2024-03-20
Payer: COMMERCIAL

## 2024-03-20 VITALS
RESPIRATION RATE: 18 BRPM | HEIGHT: 64 IN | BODY MASS INDEX: 30.99 KG/M2 | WEIGHT: 181.5 LBS | OXYGEN SATURATION: 99 % | SYSTOLIC BLOOD PRESSURE: 118 MMHG | DIASTOLIC BLOOD PRESSURE: 74 MMHG | HEART RATE: 89 BPM | TEMPERATURE: 97.9 F

## 2024-03-20 DIAGNOSIS — H01.134 ECZEMATOUS DERMATITIS OF UPPER AND LOWER EYELIDS OF BOTH EYES: Primary | ICD-10-CM

## 2024-03-20 DIAGNOSIS — H01.131 ECZEMATOUS DERMATITIS OF UPPER AND LOWER EYELIDS OF BOTH EYES: Primary | ICD-10-CM

## 2024-03-20 DIAGNOSIS — F43.20 ADJUSTMENT DISORDER, UNSPECIFIED TYPE: Primary | ICD-10-CM

## 2024-03-20 DIAGNOSIS — H01.132 ECZEMATOUS DERMATITIS OF UPPER AND LOWER EYELIDS OF BOTH EYES: Primary | ICD-10-CM

## 2024-03-20 DIAGNOSIS — H01.135 ECZEMATOUS DERMATITIS OF UPPER AND LOWER EYELIDS OF BOTH EYES: Primary | ICD-10-CM

## 2024-03-20 PROCEDURE — 90834 PSYTX W PT 45 MINUTES: CPT

## 2024-03-20 PROCEDURE — 99213 OFFICE O/P EST LOW 20 MIN: CPT | Performed by: FAMILY MEDICINE

## 2024-03-20 RX ORDER — DIAPER,BRIEF,INFANT-TODD,DISP
EACH MISCELLANEOUS 2 TIMES DAILY
Qty: 15 G | Refills: 1 | Status: SHIPPED | OUTPATIENT
Start: 2024-03-20

## 2024-03-20 NOTE — PROGRESS NOTES
"Name: Karine Dan      : 1980      MRN: [id number]  Encounter Provider: Mahendra Jeffery MD  Encounter Date: 3/20/2024   Encounter department: [facility]    Assessment & Plan     1. Eczematous dermatitis of upper and lower eyelids of both eyes  -     Ambulatory Referral to Dermatology; Future  -     hydrocortisone 0.5 % cream; Apply topically 2 (two) times a day Apply topically 2 times daily for 2 weeks.  Do not exceed 2 weeks of use to prevent eyelid thinning         Appears to be eczematous dermatitis of the upper and lower eyelids.  This could be contact dermatitis however it has been 3 weeks since she started experiencing symptoms and is no longer using previous eye make-up.  Recommended taking over-the-counter antihistamine.  She can try a very low-dose of hydrocortisone cream to apply to the outer eyelid.  Very short course to prevent eyelid thinning.  Will also refer to dermatology for further evaluation.  Also consider ocular rosacea.  She understands and agrees with this plan.   Subjective      itching eyes had started a new eye cream and had to scrub her mascara off.   Switched to previously eye cream   Cerave eye cream- 3 weeks.   No new pets or change in laudnry detergent       Review of Systems   Eyes:  Positive for itching. Negative for photophobia, pain, redness and visual disturbance.        Eyelid redness and flaking       Current Outpatient Medications on File Prior to Visit   Medication Sig   • carbamide peroxide (Debrox) 6.5 % otic solution    • fluticasone (FLONASE) 50 mcg/act nasal spray 1 spray into each nostril daily   • loratadine (Alavert) 10 MG dissolvable tablet        Objective     /74 (BP Location: Left arm, Patient Position: Sitting, Cuff Size: Standard)   Pulse 89   Temp 97.9 °F (36.6 °C) (Temporal)   Resp 18   Ht 5' 4\" (1.626 m)   Wt 82.3 kg (181 lb 8 oz)   SpO2 99%   BMI 31.15 kg/m²     Physical Exam  Vitals and nursing note reviewed. "   Constitutional:       Appearance: She is well-developed.   HENT:      Head: Normocephalic and atraumatic.   Eyes:      General:         Right eye: No discharge.         Left eye: No discharge.      Extraocular Movements: Extraocular movements intact.      Conjunctiva/sclera: Conjunctivae normal.      Pupils: Pupils are equal, round, and reactive to light.   Cardiovascular:      Rate and Rhythm: Normal rate and regular rhythm.   Pulmonary:      Effort: Pulmonary effort is normal.      Breath sounds: Normal breath sounds.   Skin:     General: Skin is dry.      Comments: Dry erythematous scaly skin around the eyelids   Neurological:      General: No focal deficit present.      Mental Status: She is alert.   Psychiatric:         Mood and Affect: Mood normal.         Behavior: Behavior normal.       Mahendra Jeffery MD

## 2024-03-20 NOTE — PSYCH
"Behavioral Health Psychotherapy Progress Note    Psychotherapy Provided: Individual Psychotherapy     1. Adjustment disorder, unspecified type            Goals addressed in session: Goal 1     DATA: Karine shared she is feeling stressed out at work and is being asked to do some side projects which she is not happy with at times.  Therapist encouraged her to think about saying no to projects and utilize IFS to manage her feelings and younger parts of herself that are activated by saying no to projects.  She shared her brother has a hip disorder and was supposed to have hip surgery on Monday but had a panic attack and did not get the surgery.    During this session, this clinician used the following therapeutic modalities: Client-centered Therapy and IFS    Substance Abuse was not addressed during this session. If the client is diagnosed with a co-occurring substance use disorder, please indicate any changes in the frequency or amount of use: . Stage of change for addressing substance use diagnoses: No substance use/Not applicable    ASSESSMENT:  Karine Dan presents with a Euthymic/ normal mood.     her affect is Normal range and intensity, which is congruent, with her mood and the content of the session. The client has made progress on their goals.     Karine Dan presents with a none risk of suicide, none risk of self-harm, and none risk of harm to others.    For any risk assessment that surpasses a \"low\" rating, a safety plan must be developed.    A safety plan was indicated: no  If yes, describe in detail     PLAN: Between sessions, Karine Dan will utilize IFS . At the next session, the therapist will use Client-centered Therapy and IFS  to address difficulties saying no.    Behavioral Health Treatment Plan and Discharge Planning: Karine Dan is aware of and agrees to continue to work on their treatment plan. They have identified and are working toward their discharge goals. yes    Visit start " and stop times:    03/20/24  Start Time: 1800  Stop Time: 1852  Total Visit Time: 52 minutes  Virtual Regular Visit    Verification of patient location:    Patient is located at Home in the following state in which I hold an active license [place]      Assessment/Plan:    Problem List Items Addressed This Visit       Adjustment disorder - Primary       Goals addressed in session: Goal 1          Reason for visit is No chief complaint on file.       Encounter provider Cherie Finnegan LCSW    Provider located at [address]  [place] [address] [phone]      Recent Visits  No visits were found meeting these conditions.  Showing recent visits within past 7 days and meeting all other requirements  Today's Visits  Date Type Provider Dept   03/20/24 Telemedicine Cherie Finnegan LCSW  [facility]   Showing today's visits and meeting all other requirements  Future Appointments  No visits were found meeting these conditions.  Showing future appointments within next 150 days and meeting all other requirements       The patient was identified by name and date of birth. Karine Dan was informed that this is a telemedicine visit and that the visit is being conducted throughthe [employer] platform. She agrees to proceed..  My office door was closed. No one else was in the room.  She acknowledged consent and understanding of privacy and security of the video platform. The patient has agreed to participate and understands they can discontinue the visit at any time.    Patient is aware this is a billable service.     Subjective  Karine Dan is a 44 y.o. female  .      HPI     Past Medical History:   Diagnosis Date    Abnormal Pap smear of cervix     Allergic     Seasonal    Allergic rhinitis     last assessed-12/31/2012    Anemia     Anxiety     Periodically throughout [facility]    Chronic pain syndrome  2020    SANDEEP III (cervical intraepithelial neoplasia grade III) with severe dysplasia     LEEP cone biopsy for SANDEEP-3 04.  Done at the hospital    Gestational diabetes     antepartum condition or prior complicated delivery    Impacted cerumen of both ears     last assessed-2012    Low back pain     Oligomenorrhea     Spinal stenosis     Spinal stenosis of lumbar region without neurogenic claudication 2019    Vaginal candidiasis     last assessed-2013    Varicella     chickenpox as a child    Vertigo     last assessed-2013    Visual impairment     Wear reading glasses       Past Surgical History:   Procedure Laterality Date    CERVICAL BIOPSY  W/ LOOP ELECTRODE EXCISION  2004    Pathololgy with SANDEEP-2 to 3, with free margins but dysplasia extended to within 1mm of endocervical margin.  2004     SECTION, LOW TRANSVERSE  2009    Primary low transverse  for arrest in the active phase at 4 cm with unengaged vertex despite hours of labor    COLPOSCOPY W/ BIOPSY / CURETTAGE  2003    EPIDURAL BLOCK INJECTION N/A 2020    Procedure: L4-L5 LUMBAR EPIDURAL STEROID INJECTION (51487);  Surgeon: Elke Aguilar MD;  Location: [facility] MAIN OR;  Service: Pain Management     FOOT SURGERY Right     TOOTH EXTRACTION         Current Outpatient Medications   Medication Sig Dispense Refill    carbamide peroxide (Debrox) 6.5 % otic solution       fluticasone (FLONASE) 50 mcg/act nasal spray 1 spray into each nostril daily      hydrocortisone 0.5 % cream Apply topically 2 (two) times a day Apply topically 2 times daily for 2 weeks.  Do not exceed 2 weeks of use to prevent eyelid thinning 15 g 1    loratadine (Alavert) 10 MG dissolvable tablet        No current facility-administered medications for this visit.        No Known Allergies    Review of Systems    Video Exam    There were no vitals filed for this visit.    Physical Exam

## 2024-03-21 ENCOUNTER — EVALUATION (OUTPATIENT)
Dept: PHYSICAL THERAPY | Facility: CLINIC | Age: 44
End: 2024-03-21
Payer: COMMERCIAL

## 2024-03-21 DIAGNOSIS — M54.41 CHRONIC RIGHT-SIDED LOW BACK PAIN WITH RIGHT-SIDED SCIATICA: Primary | ICD-10-CM

## 2024-03-21 DIAGNOSIS — G89.29 CHRONIC RIGHT-SIDED LOW BACK PAIN WITH RIGHT-SIDED SCIATICA: Primary | ICD-10-CM

## 2024-03-21 PROCEDURE — 97110 THERAPEUTIC EXERCISES: CPT | Performed by: PHYSICAL THERAPIST

## 2024-03-21 PROCEDURE — 97161 PT EVAL LOW COMPLEX 20 MIN: CPT | Performed by: PHYSICAL THERAPIST

## 2024-03-21 NOTE — PROGRESS NOTES
PT Evaluation     Today's date: 3/21/2024  Patient name: Karine Dan  : 1980  MRN: [id number]  Referring provider: Yoel Pedro DO  Dx:   Encounter Diagnosis     ICD-10-CM    1. Chronic right-sided low back pain with right-sided sciatica  M54.41 Ambulatory referral to Physical Therapy    G89.29                      Assessment  Assessment details: Karine Dan is a 44 y.o. female who presents with pain. Due to these impairments, patient has difficulty performing ADL's. Patient's clinical presentation is consistent with their referring diagnosis of Chronic right-sided low back pain with right-sided sciatica  Plan: Ambulatory referral to Physical Therapy  . Patient has been educated in home exercise program and plan of care. Patient would benefit from skilled physical therapy services to address their aforementioned functional limitations and progress towards prior level of function and independence with home exercise program.     Impairments: abnormal gait, abnormal or restricted ROM, activity intolerance, impaired physical strength, lacks appropriate home exercise program, pain with function, poor posture  and poor body mechanics  Understanding of Dx/Px/POC: good   Prognosis: good    Goals  Short term goals to be accomplished in 3 weeks:  STG 1: Pt will demo independence with postural management  STG 2: Pt will demo I with HEP to maximize progress between therapy sessions  STG 3:  Pt will demo 1/2 MMT grade core stabilizers to improve lumbar stability with functional challenges  STG 4: Pt will reports pain dec freq and intensity 50%  STG 5: Pt will deny sleep disturbance    Long term goals to be accomplished in 6 weeks:  LTG 1: Pt will be able to return to sleep pain free as per PLOF  LTG 2: Pt will demo Good strength core stabilizers to promote carryover with body mech and posture    Plan  Plan details:  HEP development, stretching, strengthening, A/AA/PROM, joint mobilizations, posture education,  STM/MI as needed to reduce muscle tension, muscle reeducation, PLOC discussed and agreed upon with patient.      Patient would benefit from: PT eval and skilled physical therapy  Planned modality interventions: cryotherapy and thermotherapy: hydrocollator packs  Planned therapy interventions: manual therapy, neuromuscular re-education, self care, therapeutic activities, therapeutic exercise and home exercise program  Frequency: 1x week  Duration in weeks: 6  Treatment plan discussed with: patient        Subjective Evaluation    History of Present Illness  Mechanism of injury: Pt noticed LBP and pain and pain into both legs <10 years ago. Imaging (+) arthritis and spinal stenosis. Her symptoms persisted and she had seen pain management, ALBER reduced symptoms 3 months. She's been going to chiropractor for last 5 years. She is however noticing R low back and buttock area can bother her when she sleeps. She sleeps on her side or on her stomach. Once she gets up and walks around this can clear her pain out and she can get back to sleep. Family history for osteopenia and osteoporosis. She does feel her symptoms are overall a bit better.      Pt is very active, at the gym.  Quality of life: good    Pain  At worst pain ratin (very short lived in waking/morning hours)          Objective     Concurrent Complaints  Positive for disturbed sleep. Negative for night pain    Postural Observations  Seated posture: good  Standing posture: good      Neurological Testing     Sensation     Lumbar   Left   Intact: light touch    Right   Intact: light touch    Additional Neurological Details  Neurodynamics (-)    Active Range of Motion     Lumbar   Flexion:  WFL  Extension:  WFL  Left lateral flexion:  WFL  Right lateral flexion:  WFL    Joint Play   Joints within functional limits: L1, L2, L3, L4 and L5     Strength/Myotome Testing     Additional Strength Details  TrvA: Fair           NO AUTH REQ      Precautions:  Standard.      Visit 1       3/21/24                           Neuro Re-Ed       REIL x10                                                Ther Ex       Whale tails x10      Bird dog with hip lift  X12 ea      Side planks 45-60s ea      RDLs X10 B    X10 Uni                                  Ther Activity        Access Code [email or web address]             Gait Training                     Modalities

## 2024-04-17 ENCOUNTER — TELEMEDICINE (OUTPATIENT)
Dept: PSYCHIATRY | Facility: CLINIC | Age: 44
End: 2024-04-17
Payer: COMMERCIAL

## 2024-04-17 DIAGNOSIS — F43.20 ADJUSTMENT DISORDER, UNSPECIFIED TYPE: Primary | ICD-10-CM

## 2024-04-17 PROCEDURE — 90834 PSYTX W PT 45 MINUTES: CPT

## 2024-04-17 NOTE — PSYCH
"Behavioral Health Psychotherapy Progress Note    Psychotherapy Provided: Individual Psychotherapy     1. 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  She talked about wanting to get back into yoga since she has not done so in awhile.    During this session, this clinician used the following therapeutic modalities: Client-centered Therapy    Substance Abuse was not addressed during this session. If the client is diagnosed with a co-occurring substance use disorder, please indicate any changes in the frequency or amount of use: . Stage of change for addressing substance use diagnoses: No substance use/Not applicable    ASSESSMENT:  Karine Dan presents with a Euthymic/ normal mood.     her affect is Normal range and intensity, which is congruent, with her mood and the content of the session. The client has made progress on their goals.     Karine Dan presents with a none risk of suicide, none risk of self-harm, and none risk of harm to others.    For any risk assessment that surpasses a \"low\" rating, a safety plan must be developed.    A safety plan was indicated: no  If yes, describe in detail     PLAN: Between sessions, Karine Dan will utilize yoga and other relaxation skills for improved anxiety. At the next session, the therapist will use Client-centered Therapy to address life stressors.    Behavioral Health Treatment Plan and Discharge Planning: Karine Dan is aware of and agrees to continue to work on their treatment plan. They have identified and are working toward their discharge goals. yes    Visit start and stop times:    04/17/24  Start Time: 1800  Stop Time: 1852  Total Visit Time: 52 minutes  Virtual Regular Visit    Verification of patient " location:    Patient is located at Home in the following state in which I hold an active license [place]      Assessment/Plan:    Problem List Items Addressed This Visit       Adjustment disorder - Primary       Goals addressed in session: Goal 1          Reason for visit is No chief complaint on file.       Encounter provider Cherie Finnegan LCSW    [facility] [place] [address] [place] [address] [phone]      Recent Visits  No visits were found meeting these conditions.  Showing recent visits within past 7 days and meeting all other requirements  Today's Visits  Date Type Provider Dept   04/17/24 Telemedicine Cherie Fin[facility]gan LCSW  [facility]   Showing today's visits and meeting all other requirements  Future Appointments  No visits were found meeting these conditions.  Showing future appointments within next 150 days and meeting all other requirements       The patient was identified by name and date of birth. Karine Dan was informed that this is a telemedicine visit and that the visit is being conducted throughthe [employer] platform. She agrees to proceed..  My office door was closed. No one else was in the room.  She acknowledged consent and understanding of privacy and security of the video platform. The patient has agreed to participate and understands they can discontinue the visit at any time.    Patient is aware this is a billable service.     Subjective  Karine Dan is a 44 y.o. female  .      HPI     Past Medical History:   Diagnosis Date    Abnormal Pap smear of cervix     Allergic     Seasonal    Allergic rhinitis     last assessed-12/31/2012    Anemia     Anxiety     Periodically throughout [facility]    Chronic pain syndrome 06/11/2020    SANDEEP III (cervical intraepithelial neoplasia grade III) with severe dysplasia     LEEP cone biopsy for SANDEEP-3 2/25/04.  Done at the  hospital    Gestational diabetes     antepartum condition or prior complicated delivery    Impacted cerumen of both ears     last assessed-2012    Low back pain     Oligomenorrhea     Spinal stenosis     Spinal stenosis of lumbar region without neurogenic claudication 2019    Vaginal candidiasis     last assessed-2013    Varicella     chickenpox as a child    Vertigo     last assessed-2013    Visual impairment     Wear reading glasses       Past Surgical History:   Procedure Laterality Date    CERVICAL BIOPSY  W/ LOOP ELECTRODE EXCISION  2004    Pathololgy with SANDEEP-2 to 3, with free margins but dysplasia extended to within 1mm of endocervical margin.  2004     SECTION, LOW TRANSVERSE  2009    Primary low transverse  for arrest in the active phase at 4 cm with unengaged vertex despite hours of labor    COLPOSCOPY W/ BIOPSY / CURETTAGE  2003    EPIDURAL BLOCK INJECTION N/A 2020    Procedure: L4-L5 LUMBAR EPIDURAL STEROID INJECTION (70688);  Surgeon: Elke Aguilar MD;  Location: [facility] MAIN OR;  Service: Pain Management     FOOT SURGERY Right     TOOTH EXTRACTION         Current Outpatient Medications   Medication Sig Dispense Refill    carbamide peroxide (Debrox) 6.5 % otic solution       fluticasone (FLONASE) 50 mcg/act nasal spray 1 spray into each nostril daily      hydrocortisone 0.5 % cream Apply topically 2 (two) times a day Apply topically 2 times daily for 2 weeks.  Do not exceed 2 weeks of use to prevent eyelid thinning 15 g 1    loratadine (Alavert) 10 MG dissolvable tablet        No current facility-administered medications for this visit.        No Known Allergies    Review of Systems    Video Exam    There were no vitals filed for this visit.    Physical Exam

## 2024-05-10 ENCOUNTER — OFFICE VISIT (OUTPATIENT)
Dept: FAMILY MEDICINE CLINIC | Facility: CLINIC | Age: 44
End: 2024-05-10
Payer: COMMERCIAL

## 2024-05-10 ENCOUNTER — ANNUAL EXAM (OUTPATIENT)
Dept: GYNECOLOGY | Facility: CLINIC | Age: 44
End: 2024-05-10
Payer: COMMERCIAL

## 2024-05-10 VITALS
BODY MASS INDEX: 30.9 KG/M2 | HEIGHT: 64 IN | TEMPERATURE: 98 F | DIASTOLIC BLOOD PRESSURE: 72 MMHG | OXYGEN SATURATION: 98 % | WEIGHT: 181 LBS | SYSTOLIC BLOOD PRESSURE: 114 MMHG | HEART RATE: 78 BPM

## 2024-05-10 VITALS
DIASTOLIC BLOOD PRESSURE: 70 MMHG | HEIGHT: 64 IN | SYSTOLIC BLOOD PRESSURE: 118 MMHG | BODY MASS INDEX: 30.46 KG/M2 | WEIGHT: 178.4 LBS

## 2024-05-10 DIAGNOSIS — H61.23 BILATERAL IMPACTED CERUMEN: ICD-10-CM

## 2024-05-10 DIAGNOSIS — Z12.31 ENCOUNTER FOR SCREENING MAMMOGRAM FOR BREAST CANCER: ICD-10-CM

## 2024-05-10 DIAGNOSIS — Z01.419 WOMEN'S ANNUAL ROUTINE GYNECOLOGICAL EXAMINATION: Primary | ICD-10-CM

## 2024-05-10 DIAGNOSIS — Z97.5 IUD CONTRACEPTION: ICD-10-CM

## 2024-05-10 DIAGNOSIS — Z12.12 SCREENING FOR COLORECTAL CANCER: ICD-10-CM

## 2024-05-10 DIAGNOSIS — Z12.11 SCREENING FOR COLORECTAL CANCER: ICD-10-CM

## 2024-05-10 DIAGNOSIS — N91.4 SECONDARY OLIGOMENORRHEA: ICD-10-CM

## 2024-05-10 DIAGNOSIS — Z00.00 ANNUAL PHYSICAL EXAM: Primary | ICD-10-CM

## 2024-05-10 PROCEDURE — 99396 PREV VISIT EST AGE 40-64: CPT | Performed by: FAMILY MEDICINE

## 2024-05-10 PROCEDURE — 69210 REMOVE IMPACTED EAR WAX UNI: CPT | Performed by: FAMILY MEDICINE

## 2024-05-10 PROCEDURE — S0612 ANNUAL GYNECOLOGICAL EXAMINA: HCPCS | Performed by: OBSTETRICS & GYNECOLOGY

## 2024-05-10 NOTE — PROGRESS NOTES
Assessment/Plan   Diagnoses and all orders for this visit:    Women's annual routine gynecological examination    Encounter for screening mammogram for breast cancer  -     Mammo screening bilateral w 3d & cad; Future    IUD contraception    Secondary oligomenorrhea    1. yearly exam-Pap smear deferred, self breast awareness reviewed, calcium/vitamin D recommendations discussed, mammogram request given, colonoscopy ordered through primary doctor.  2. Kyleena IUD-inserted on 2/24/2023.  It is in good position on exam, seen at a length of 2.5-3 cm.  She will call or return with any issues.  Status post Suzanne, Kyleena, and now second Kyleena.  3. secondary oligomenorrhea-does note light bleeding approximately every 1 to 3 months time.  She will call return with any menometrorrhagia.  4. prior history of severe dysplasia-status post LEEP cone biopsy 2004 with negative testing since then.  Pap with HPV was negative from 5/5/2023 and 4/12/2021.  Pap was deferred today as per current guidelines with patient agreement.  5. previous dense breast changes-mammogram 3D from 8/10/2023 was normal density.  3D mammogram ordered postdated August 2024.  6. intermittent bloating-noted over the past few months or so.  She denies any focal symptoms.  IUD is in good position.  Pelvic exam is entirely benign.  Did review differential diagnosis including GYN, GI, urinary, and musculoskeletal.  Did offer pelvic ultrasound, she will consider this.  She does have referral for colonoscopy for GI through her primary doctor.  She denies urinary symptoms, would proceed with urinalysis and evaluation as needed.  She will consider and let me know if she wishes evaluation.  7.  Other-son I helped deliver is now age 15.  My coagulations given.  Otherwise, follow-up 1 year for yearly exam or as needed.    Subjective   Patient ID: Karine Dan is a 44 y.o. female.    Vitals:    05/10/24 1258   BP: 118/70     Seen today for yearly exam.  Please see  assessment plan for details.      The following portions of the patient's history were reviewed and updated as appropriate: allergies, current medications, past family history, past medical history, past social history, past surgical history, and problem list.  Past Medical History:   Diagnosis Date    Abnormal Pap smear of cervix     Allergic     Seasonal    Allergic rhinitis     last assessed-2012    Anemia     Anxiety     Periodically throughout [facility]    Chronic pain syndrome 2020    SANDEEP III (cervical intraepithelial neoplasia grade III) with severe dysplasia     LEEP cone biopsy for SANDEEP-3 04.  Done at the hospital    Gestational diabetes     antepartum condition or prior complicated delivery    Impacted cerumen of both ears     last assessed-2012    Low back pain     Oligomenorrhea     Spinal stenosis     Spinal stenosis of lumbar region without neurogenic claudication 2019    Vaginal candidiasis     last assessed-2013    Varicella     chickenpox as a child    Vertigo     last assessed-2013    Visual impairment     Wear reading glasses     Past Surgical History:   Procedure Laterality Date    CERVICAL BIOPSY  W/ LOOP ELECTRODE EXCISION  2004    Pathololgy with SANDEEP-2 to 3, with free margins but dysplasia extended to within 1mm of endocervical margin.  2004     SECTION, LOW TRANSVERSE  2009    Primary low transverse  for arrest in the active phase at 4 cm with unengaged vertex despite hours of labor    COLPOSCOPY W/ BIOPSY / CURETTAGE  2003    EPIDURAL BLOCK INJECTION N/A 2020    Procedure: L4-L5 LUMBAR EPIDURAL STEROID INJECTION (19920);  Surgeon: Elke Aguilar MD;  Location: [facility] MAIN OR;  Service: Pain Management     FOOT SURGERY Right     TOOTH EXTRACTION       OB History    Para Term  AB Living   1 1 1     1   SAB IAB Ectopic Multiple Live Births           1      # Outcome Date GA Lbr Gregory/2nd Weight Sex  Delivery Anes PTL Lv   1 Term               Obstetric Comments    1       Current Outpatient Medications:     fluticasone (FLONASE) 50 mcg/act nasal spray, 1 spray into each nostril daily, Disp: , Rfl:     Levonorgestrel (KYLEENA IU), by Intrauterine route, Disp: , Rfl:     loratadine (Alavert) 10 MG dissolvable tablet, , Disp: , Rfl:     carbamide peroxide (Debrox) 6.5 % otic solution, , Disp: , Rfl:   No Known Allergies  Social History     Socioeconomic History    Marital status: /Civil Union     Spouse name: None    Number of children: 1    Years of education: None    Highest education level: None   Occupational History    None   Tobacco Use    Smoking status: Never    Smokeless tobacco: Never   Vaping Use    Vaping status: Never Used   Substance and Sexual Activity    Alcohol use: Yes     Alcohol/week: 4.0 standard drinks of alcohol     Types: 4 Glasses of wine per week    Drug use: No    Sexual activity: Yes     Partners: Male     Birth control/protection: I.U.D.   Other Topics Concern    None   Social History Narrative    Caffeine use    [employer] Affiliation [employer])    Uses safety equipment-seatbelts     Social Determinants of Health     Financial Resource Strain: Not on file   Food Insecurity: Not on file   Transportation Needs: Not on file   Physical Activity: Not on file   Stress: Not on file   Social Connections: Not on file   Intimate Partner Violence: Not on file   Housing Stability: Not on file     Family History   Problem Relation Age of Onset    Skin cancer Mother     Hypertension Mother     Arthritis Mother     Anxiety disorder Mother     Hypertension Father     Kidney failure Father     Depression Father         Not official diagnosed    Heart disease Father         CHF    Drug abuse Father         Marijuana    Hypertension Maternal Grandmother     Lung cancer Maternal Grandmother     Arthritis Maternal Grandmother     Cancer Maternal Grandmother         Lung    Anxiety  disorder Maternal Grandmother         Not official diagnosed    Cancer Maternal Grandfather         Bladder    Hypertension Maternal Grandfather     Alcohol abuse Maternal Grandfather     Hearing loss Maternal Grandfather     Lung cancer Paternal Grandmother     Cancer Paternal Grandmother         Lung    Heart disease Paternal Grandfather     No Known Problems Brother     No Known Problems Son     Alcohol abuse Maternal Aunt     Drug abuse Maternal Aunt         Various substances    Anxiety disorder Maternal Aunt         Not official diagnosed    No Known Problems Paternal Aunt     No Known Problems Paternal Uncle     No Known Problems Paternal Uncle     No Known Problems Paternal Uncle     Alcohol abuse Brother     Depression Brother         Not official diagnosed    Hypertension Brother     Anxiety disorder Brother     Drug abuse Brother         Various substances       Review of Systems   Constitutional:  Negative for chills, diaphoresis, fatigue and fever.   Respiratory:  Negative for apnea, cough, chest tightness, shortness of breath and wheezing.    Cardiovascular:  Negative for chest pain, palpitations and leg swelling.   Gastrointestinal:  Negative for abdominal distention, abdominal pain, anal bleeding, constipation, diarrhea, nausea, rectal pain and vomiting.   Genitourinary:  Negative for difficulty urinating, dyspareunia, dysuria, frequency, hematuria, menstrual problem, pelvic pain, urgency, vaginal bleeding, vaginal discharge and vaginal pain.   Musculoskeletal:  Negative for arthralgias, back pain and myalgias.   Skin:  Negative for color change and rash.   Neurological:  Negative for dizziness, syncope, light-headedness, numbness and headaches.   Hematological:  Negative for adenopathy. Does not bruise/bleed easily.   Psychiatric/Behavioral:  Negative for dysphoric mood and sleep disturbance. The patient is not nervous/anxious.        Objective   Physical Exam  OBGyn Exam     Objective      BP  "118/70 (BP Location: Right arm, Patient Position: Sitting)   Ht 5' 4\" (1.626 m)   Wt 80.9 kg (178 lb 6.4 oz)   BMI 30.62 kg/m²     General:   alert and oriented, in no acute distress   Neck: normal to inspection and palpation   Breast: normal appearance, no masses or tenderness   Heart:    Lungs:    Abdomen: soft, non-tender, without masses or organomegaly   Vulva: normal   Vagina: Without erythema or lesions or discharge.  Normal   Cervix: Without lesions or discharge or cervicitis.  No Cervical motion tenderness.  IUD strings seen at a length of 2.5 to 3 cm   Uterus: top normal size, anteverted, non-tender   Adnexa: no mass, fullness, tenderness   Rectum: negative    Psych:  Normal mood and affect   Skin:  Without obvious lesions   Eyes: symmetric, with normal movements and reactivity   Musculoskeletal:  Normal muscle tone and movements appreciated       "

## 2024-05-10 NOTE — PROGRESS NOTES
ADULT ANNUAL PHYSICAL  [facility]    NAME: Karine Dan  AGE: 44 y.o. SEX: female  : 1980     DATE: 5/10/2024     Assessment and Plan:     Problem List Items Addressed This Visit    None  Visit Diagnoses     Annual physical exam    -  Primary    Relevant Orders    Lipid panel    Hemoglobin A1C    Screening for colorectal cancer        Relevant Orders    Ambulatory referral to Gastroenterology    Bilateral impacted cerumen        Relevant Orders    Ear cerumen removal (Completed)    BMI 30.0-30.9,adult        Relevant Orders    Basic metabolic panel    TSH + Free T4    CBC and Platelet      Annual physical completed today  Colonoscopy ordered  Mammogram up-to-date ordered by OB/GYN  Cerumen removal completed today      Immunizations and preventive care screenings were discussed with patient today. Appropriate education was printed on patient's after visit summary.    Counseling:  Alcohol/drug use: discussed moderation in alcohol intake, the recommendations for healthy alcohol use, and avoidance of illicit drug use.  Dental Health: discussed importance of regular tooth brushing, flossing, and dental visits.  Injury prevention: discussed safety/seat belts, safety helmets, smoke detectors, carbon dioxide detectors, and smoking near bedding or upholstery.  Sexual health: discussed sexually transmitted diseases, partner selection, use of condoms, avoidance of unintended pregnancy, and contraceptive alternatives.  Exercise: the importance of regular exercise/physical activity was discussed. Recommend exercise 3-5 times per week for at least 30 minutes.      Ear cerumen removal    Date/Time: 5/10/2024 11:00 AM    Performed by: Mahendra Jeffery MD  Authorized by: Mahendra Jeffery MD  Universal Protocol:  Consent: Verbal consent obtained.  Patient understanding: patient states understanding of the procedure being performed  Patient identity  confirmed: verbally with patient    Patient location:  Clinic  Procedure details:     Location:  R ear and L ear    Procedure type: irrigation with instrumentation      Instrumentation: curette      Approach:  External  Post-procedure details:     Complication:  None    Hearing quality:  Improved    Patient tolerance of procedure:  Tolerated well, no immediate complications        No follow-ups on file.     Chief Complaint:     Chief Complaint   Patient presents with   • Physical Exam      History of Present Illness:     Adult Annual Physical   Patient here for a comprehensive physical exam. The patient reports None    Diet and Physical Activity  Diet/Nutrition: well balanced diet and consuming 3-5 servings of fruits/vegetables daily.   Exercise: 3-4 times a week on average.      Depression Screening  PHQ-2/9 Depression Screening    Little interest or pleasure in doing things: 0 - not at all  Feeling down, depressed, or hopeless: 0 - not at all  PHQ-2 Score: 0  PHQ-2 Interpretation: Negative depression screen       General Health  Sleep: sleeps well and gets 7-8 hours of sleep on average.   Hearing: normal - bilateral.  Vision: goes for regular eye exams and wears glasses.   Dental: regular dental visits.       /GYN Health  Follows with gynecology? yes        Review of Systems:     Review of Systems   Constitutional:  Negative for activity change, fatigue and fever.   Eyes:  Negative for visual disturbance.   Respiratory:  Negative for shortness of breath.    Cardiovascular:  Negative for chest pain.   Gastrointestinal:  Negative for abdominal pain, constipation, diarrhea and nausea.   Endocrine: Negative for cold intolerance and heat intolerance.   Musculoskeletal:  Negative for back pain.   Skin:  Negative for rash.   Neurological:  Negative for headaches.   Psychiatric/Behavioral:  Negative for confusion.       Past Medical History:     Past Medical History:   Diagnosis Date   • Abnormal Pap smear of cervix     • Allergic     Seasonal   • Allergic rhinitis     last assessed-2012   • Anemia    • Anxiety     Periodically throughout [facility]   • Chronic pain syndrome 2020   • SANDEEP III (cervical intraepithelial neoplasia grade III) with severe dysplasia     LEEP cone biopsy for SANDEEP-3 04.  Done at the hospital   • Gestational diabetes     antepartum condition or prior complicated delivery   • Impacted cerumen of both ears     last assessed-2012   • Low back pain    • Oligomenorrhea    • Spinal stenosis    • Spinal stenosis of lumbar region without neurogenic claudication 2019   • Vaginal candidiasis     last assessed-2013   • Varicella     chickenpox as a child   • Vertigo     last assessed-2013   • Visual impairment     Wear reading glasses      Past Surgical History:     Past Surgical History:   Procedure Laterality Date   • CERVICAL BIOPSY  W/ LOOP ELECTRODE EXCISION  2004    Pathololgy with SANDEEP-2 to 3, with free margins but dysplasia extended to within 1mm of endocervical margin.  2004   •  SECTION, LOW TRANSVERSE  2009    Primary low transverse  for arrest in the active phase at 4 cm with unengaged vertex despite hours of labor   • COLPOSCOPY W/ BIOPSY / CURETTAGE  2003   • EPIDURAL BLOCK INJECTION N/A 2020    Procedure: L4-L5 LUMBAR EPIDURAL STEROID INJECTION (99711);  Surgeon: Elke Aguilar MD;  Location: [facility] MAIN OR;  Service: Pain Management    • FOOT SURGERY Right    • TOOTH EXTRACTION        Social History:     Social History     Socioeconomic History   • Marital status: /Civil Union     Spouse name: None   • Number of children: 1   • Years of education: None   • Highest education level: None   Occupational History   • None   Tobacco Use   • Smoking status: Never   • Smokeless tobacco: Never   Vaping Use   • Vaping status: Never Used   Substance and Sexual Activity   • Alcohol use: Yes     Alcohol/week: 4.0 standard drinks  of alcohol     Types: 4 Glasses of wine per week   • Drug use: No   • Sexual activity: Yes     Partners: Male     Birth control/protection: I.U.D.   Other Topics Concern   • None   Social History Narrative    Caffeine use    [employer] Affiliation [employer])    Uses safety equipment-seatbelts     Social Determinants of Health     Financial Resource Strain: Not on file   Food Insecurity: Not on file   Transportation Needs: Not on file   Physical Activity: Not on file   Stress: Not on file   Social Connections: Not on file   Intimate Partner Violence: Not on file   Housing Stability: Not on file      Family History:     Family History   Problem Relation Age of Onset   • Skin cancer Mother    • Hypertension Mother    • Arthritis Mother    • Anxiety disorder Mother    • Hypertension Father    • Kidney failure Father    • Depression Father         Not official diagnosed   • Heart disease Father         CHF   • Drug abuse Father         Marijuana   • Hypertension Maternal Grandmother    • Lung cancer Maternal Grandmother    • Arthritis Maternal Grandmother    • Cancer Maternal Grandmother         Lung   • Anxiety disorder Maternal Grandmother         Not official diagnosed   • Cancer Maternal Grandfather         Bladder   • Hypertension Maternal Grandfather    • Alcohol abuse Maternal Grandfather    • Hearing loss Maternal Grandfather    • Lung cancer Paternal Grandmother    • Cancer Paternal Grandmother         Lung   • Heart disease Paternal Grandfather    • No Known Problems Brother    • No Known Problems Son    • Alcohol abuse Maternal Aunt    • Drug abuse Maternal Aunt         Various substances   • Anxiety disorder Maternal Aunt         Not official diagnosed   • No Known Problems Paternal Aunt    • No Known Problems Paternal Uncle    • No Known Problems Paternal Uncle    • No Known Problems Paternal Uncle    • Alcohol abuse Brother    • Depression Brother         Not official diagnosed   • Hypertension  "Brother    • Anxiety disorder Brother    • Drug abuse Brother         Various substances      Current Medications:     Current Outpatient Medications   Medication Sig Dispense Refill   • carbamide peroxide (Debrox) 6.5 % otic solution      • fluticasone (FLONASE) 50 mcg/act nasal spray 1 spray into each nostril daily     • Levonorgestrel (KYLEENA IU) by Intrauterine route     • loratadine (Alavert) 10 MG dissolvable tablet        No current facility-administered medications for this visit.      Allergies:     No Known Allergies   Physical Exam:     /72 (BP Location: Left arm, Patient Position: Sitting, Cuff Size: Standard)   Pulse 78   Temp 98 °F (36.7 °C) (Temporal)   Ht 5' 4\" (1.626 m)   Wt 82.1 kg (181 lb)   SpO2 98%   BMI 31.07 kg/m²     Physical Exam  Vitals and nursing note reviewed.   Constitutional:       Appearance: Normal appearance. She is well-developed.   HENT:      Head: Normocephalic and atraumatic.      Right Ear: There is impacted cerumen.      Left Ear: There is impacted cerumen.   Eyes:      Extraocular Movements: Extraocular movements intact.      Pupils: Pupils are equal, round, and reactive to light.   Cardiovascular:      Rate and Rhythm: Normal rate and regular rhythm.   Pulmonary:      Effort: Pulmonary effort is normal.      Breath sounds: Normal breath sounds.   Abdominal:      General: Bowel sounds are normal.      Palpations: Abdomen is soft.   Musculoskeletal:      Cervical back: Normal range of motion.   Skin:     General: Skin is warm and dry.   Neurological:      General: No focal deficit present.      Mental Status: She is alert and oriented to person, place, and time.   Psychiatric:         Mood and Affect: Mood normal.         Speech: Speech normal.         Behavior: Behavior normal.          Mahendra Jeffery MD  [facility]    "

## 2024-05-21 ENCOUNTER — PREP FOR PROCEDURE (OUTPATIENT)
Age: 44
End: 2024-05-21

## 2024-05-21 ENCOUNTER — TELEPHONE (OUTPATIENT)
Age: 44
End: 2024-05-21

## 2024-05-21 DIAGNOSIS — Z12.11 SCREENING FOR COLON CANCER: Primary | ICD-10-CM

## 2024-05-21 NOTE — TELEPHONE ENCOUNTER
05/21/24  Screened by: Misti Jimenez    Referring Provider Dr. Guy Jeffery    Pre- Screening:     There is no height or weight on file to calculate BMI.  Has patient been referred for a routine screening Colonoscopy? yes  Is the patient between 45-75 years old? yes      Previous Colonoscopy no   If yes:    Date:     Facility:     Reason:       SCHEDULING STAFF: If the patient is between 45yrs-49yrs, please advise patient to confirm benefits/coverage with their insurance company for a routine screening colonoscopy, some insurance carriers will only cover at 50yrs or older. If the patient is over 75years old, please schedule an office visit.     Does the patient want to see a Gastroenterologist prior to their procedure OR are they having any GI symptoms? no    Has the patient been hospitalized or had abdominal surgery in the past 6 months? no    Does the patient use supplemental oxygen? no    Does the patient take Coumadin, Lovenox, Plavix, Elliquis, Xarelto, or other blood thinning medication? no    Has the patient had a stroke, cardiac event, or stent placed in the past year? no    SCHEDULING STAFF: If patient answers NO to above questions, then schedule procedure. If patient answers YES to above questions, then schedule office appointment.     If patient is between 45yrs - 49yrs, please advise patient that we will have to confirm benefits & coverage with their insurance company for a routine screening colonoscopy.

## 2024-05-21 NOTE — TELEPHONE ENCOUNTER
Scheduled date of colonoscopy (as of today): 7-   Physician performing colonoscopy: Dr. Pierre  [facility] colon[facility]   Bowel prep reviewed with patient: miralax dulcolax reviewed and sent via [employer]   Instructions reviewed with patient by: madi   Clearances: n/a

## 2024-05-22 ENCOUNTER — TELEMEDICINE (OUTPATIENT)
Dept: PSYCHIATRY | Facility: CLINIC | Age: 44
End: 2024-05-22
Payer: COMMERCIAL

## 2024-05-22 DIAGNOSIS — F43.20 ADJUSTMENT DISORDER, UNSPECIFIED TYPE: Primary | ICD-10-CM

## 2024-05-22 PROCEDURE — 90834 PSYTX W PT 45 MINUTES: CPT

## 2024-05-22 NOTE — PSYCH
"Behavioral Health Psychotherapy Progress Note    Psychotherapy Provided: Individual Psychotherapy     1. Adjustment disorder, unspecified type            Goals addressed in session: Goal 1     DATA: Karine shared she got a raise from [employer] which she is happy about but also shared she is upset over some staff leaving for different reasons.  She shared she has been speaking up at work and getting constructive feedback successfully.  She shared concerns over her brother's health and her mother's anxiety over it.  Therapist encouraged open communication of her thoughts and feelings about work, family and motherhood and balancing her own needs with her family's. She shared she uses beach imagery and sending thoughts out onto the ocean in a boat to help with getting to sleep at night.  During this session, this clinician used the following therapeutic modalities: Client-centered Therapy and Mindfulness-based Strategies    Substance Abuse was not addressed during this session. If the client is diagnosed with a co-occurring substance use disorder, please indicate any changes in the frequency or amount of use: . Stage of change for addressing substance use diagnoses: No substance use/Not applicable    ASSESSMENT:  Karine Dan presents with a Euthymic/ normal mood.     her affect is Normal range and intensity, which is congruent, with her mood and the content of the session. The client has made progress on their goals.     Karine Dan presents with a none risk of suicide, none risk of self-harm, and none risk of harm to others.    For any risk assessment that surpasses a \"low\" rating, a safety plan must be developed.    A safety plan was indicated: no  If yes, describe in detail     PLAN: Between sessions, Karine Dan will utilize mindfulness to help with anxiety. At the next session, the therapist will use Client-centered Therapy and Mindfulness-based Strategies to address anxiety, life " stressors.    Behavioral Health Treatment Plan and Discharge Planning: Karine Dan is aware of and agrees to continue to work on their treatment plan. They have identified and are working toward their discharge goals. yes    Visit start and stop times:    05/22/24  Start Time: 1800  Stop Time: 1852  Total Visit Time: 52 minutes  Virtual Regular Visit    Verification of patient location:    Patient is located at Home in the following state in which I hold an active license [place]      Assessment/Plan:    Problem List Items Addressed This Visit       Adjustment disorder - Primary       Goals addressed in session: Goal 1          Reason for visit is No chief complaint on file.       Encounter provider Cherie Finnegan LCSW      Recent Visits  No visits were found meeting these conditions.  Showing recent visits within past 7 days and meeting all other requirements  Today's Visits  Date Type Provider Dept   05/22/24 Telemedicine Cherie Finnegan LCSW [facility]   Showing today's visits and meeting all other requirements  Future Appointments  No visits were found meeting these conditions.  Showing future appointments within next 150 days and meeting all other requirements       The patient was identified by name and date of birth. Karine Dan was informed that this is a telemedicine visit and that the visit is being conducted throughthe [employer] platform. She agrees to proceed..  My office door was closed. No one else was in the room.  She acknowledged consent and understanding of privacy and security of the video platform. The patient has agreed to participate and understands they can discontinue the visit at any time.    Patient is aware this is a billable service.     Subjective  Karine Dan is a 44 y.o. female  .      HPI     Past Medical History:   Diagnosis Date    Abnormal Pap smear of cervix     Allergic     Seasonal    Allergic rhinitis     last assessed-12/31/2012     Anemia     Anxiety     Periodically throughout [facility]    Chronic pain syndrome 2020    SANDEEP III (cervical intraepithelial neoplasia grade III) with severe dysplasia     LEEP cone biopsy for SANDEEP-3 04.  Done at the hospital    Gestational diabetes     antepartum condition or prior complicated delivery    Impacted cerumen of both ears     last assessed-2012    Low back pain     Oligomenorrhea     Spinal stenosis     Spinal stenosis of lumbar region without neurogenic claudication 2019    Vaginal candidiasis     last assessed-2013    Varicella     chickenpox as a child    Vertigo     last assessed-2013    Visual impairment     Wear reading glasses       Past Surgical History:   Procedure Laterality Date    CERVICAL BIOPSY  W/ LOOP ELECTRODE EXCISION  2004    Pathololgy with SANDEEP-2 to 3, with free margins but dysplasia extended to within 1mm of endocervical margin.  2004     SECTION, LOW TRANSVERSE  2009    Primary low transverse  for arrest in the active phase at 4 cm with unengaged vertex despite hours of labor    COLPOSCOPY W/ BIOPSY / CURETTAGE  2003    EPIDURAL BLOCK INJECTION N/A 2020    Procedure: L4-L5 LUMBAR EPIDURAL STEROID INJECTION (96688);  Surgeon: Elke Aguilar MD;  Location: [facility] MAIN OR;  Service: Pain Management     FOOT SURGERY Right     TOOTH EXTRACTION         Current Outpatient Medications   Medication Sig Dispense Refill    carbamide peroxide (Debrox) 6.5 % otic solution  (Patient not taking: Reported on 5/10/2024)      fluticasone (FLONASE) 50 mcg/act nasal spray 1 spray into each nostril daily      Levonorgestrel (KYLEENA IU) by Intrauterine route      loratadine (Alavert) 10 MG dissolvable tablet        No current facility-administered medications for this visit.        No Known Allergies    Review of Systems    Video Exam    There were no vitals filed for this visit.    Physical Exam

## 2024-05-22 NOTE — PSYCH
Virtual Regular Visit    Verification of patient location:    Patient is located at {[facility] Virtual Patient Location:[address]} in the following state in which I hold an active license {[facility] virtual patient location:[address]}      Assessment/Plan:    Problem List Items Addressed This Visit     Adjustment disorder - Primary       Goals addressed in session: {GOALS:82153}          Reason for visit is No chief complaint on file.       Encounter provider Cherie Finnegan LCSW      Recent Visits  No visits were found meeting these conditions.  Showing recent visits within past 7 days and meeting all other requirements  Future Appointments  No visits were found meeting these conditions.  Showing future appointments within next 150 days and meeting all other requirements       The patient was identified by name and date of birth. Karine Dan was informed that this is a telemedicine visit and that the visit is being conducted through{[facility] VIRTUAL VISIT MEDIUM:98194}.  {Telemedicine confidentiality :54203} {Telemedicine participants:91948}  She acknowledged consent and understanding of privacy and security of the video platform. The patient has agreed to participate and understands they can discontinue the visit at any time.    Patient is aware this is a billable service.     Subjective  Karine Dan is a 44 y.o. female *** .      HPI     Past Medical History:   Diagnosis Date   • Abnormal Pap smear of cervix    • Allergic     Seasonal   • Allergic rhinitis     last assessed-12/31/2012   • Anemia    • Anxiety     Periodically throughout [facility]   • Chronic pain syndrome 06/11/2020   • SANDEEP III (cervical intraepithelial neoplasia grade III) with severe dysplasia     LEEP cone biopsy for SANDEEP-3 2/25/04.  Done at the hospital   • Gestational diabetes     antepartum condition or prior complicated delivery   • Impacted cerumen of both ears     last assessed-6/13/2012   • Low back pain    • Oligomenorrhea    • Spinal stenosis    •  Spinal stenosis of lumbar region without neurogenic claudication 2019   • Vaginal candidiasis     last assessed-2013   • Varicella     chickenpox as a child   • Vertigo     last assessed-2013   • Visual impairment     Wear reading glasses       Past Surgical History:   Procedure Laterality Date   • CERVICAL BIOPSY  W/ LOOP ELECTRODE EXCISION  2004    Pathololgy with SANDEEP-2 to 3, with free margins but dysplasia extended to within 1mm of endocervical margin.  2004   •  SECTION, LOW TRANSVERSE  2009    Primary low transverse  for arrest in the active phase at 4 cm with unengaged vertex despite hours of labor   • COLPOSCOPY W/ BIOPSY / CURETTAGE  2003   • EPIDURAL BLOCK INJECTION N/A 2020    Procedure: L4-L5 LUMBAR EPIDURAL STEROID INJECTION (86416);  Surgeon: Elke Aguilar MD;  Location: [facility] MAIN OR;  Service: Pain Management    • FOOT SURGERY Right    • TOOTH EXTRACTION         Current Outpatient Medications   Medication Sig Dispense Refill   • carbamide peroxide (Debrox) 6.5 % otic solution  (Patient not taking: Reported on 5/10/2024)     • fluticasone (FLONASE) 50 mcg/act nasal spray 1 spray into each nostril daily     • Levonorgestrel (KYLEENA IU) by Intrauterine route     • loratadine (Alavert) 10 MG dissolvable tablet        No current facility-administered medications for this visit.        No Known Allergies    Review of Systems    Video Exam    There were no vitals filed for this visit.    Physical Exam     Visit Time    Visit Start Time: ***  Visit Stop Time: ***  Total Visit Duration: {Psych Total Visit Time:41125}

## 2024-06-12 ENCOUNTER — TELEMEDICINE (OUTPATIENT)
Dept: PSYCHIATRY | Facility: CLINIC | Age: 44
End: 2024-06-12
Payer: COMMERCIAL

## 2024-06-12 DIAGNOSIS — F43.20 ADJUSTMENT DISORDER, UNSPECIFIED TYPE: Primary | ICD-10-CM

## 2024-06-12 PROCEDURE — 90834 PSYTX W PT 45 MINUTES: CPT
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
## 2024-06-13 ENCOUNTER — APPOINTMENT (OUTPATIENT)
Dept: LAB | Facility: CLINIC | Age: 44
End: 2024-06-13
Payer: COMMERCIAL

## 2024-06-13 DIAGNOSIS — Z00.00 ANNUAL PHYSICAL EXAM: ICD-10-CM

## 2024-06-13 LAB
ANION GAP SERPL CALCULATED.3IONS-SCNC: 6 MMOL/L (ref 4–13)
BUN SERPL-MCNC: 16 MG/DL (ref 5–25)
CALCIUM SERPL-MCNC: 9.1 MG/DL (ref 8.4–10.2)
CHLORIDE SERPL-SCNC: 105 MMOL/L (ref 96–108)
CHOLEST SERPL-MCNC: 172 MG/DL
CO2 SERPL-SCNC: 27 MMOL/L (ref 21–32)
CREAT SERPL-MCNC: 0.69 MG/DL (ref 0.6–1.3)
ERYTHROCYTE [DISTWIDTH] IN BLOOD BY AUTOMATED COUNT: 12.3 % (ref 11.6–15.1)
EST. AVERAGE GLUCOSE BLD GHB EST-MCNC: 105 MG/DL
GFR SERPL CREATININE-BSD FRML MDRD: 105 ML/MIN/1.73SQ M
GLUCOSE P FAST SERPL-MCNC: 81 MG/DL (ref 65–99)
HBA1C MFR BLD: 5.3 %
HCT VFR BLD AUTO: 40.9 % (ref 34.8–46.1)
HDLC SERPL-MCNC: 62 MG/DL
HGB BLD-MCNC: 13.4 G/DL (ref 11.5–15.4)
LDLC SERPL CALC-MCNC: 101 MG/DL (ref 0–100)
MCH RBC QN AUTO: 32.4 PG (ref 26.8–34.3)
MCHC RBC AUTO-ENTMCNC: 32.8 G/DL (ref 31.4–37.4)
MCV RBC AUTO: 99 FL (ref 82–98)
NONHDLC SERPL-MCNC: 110 MG/DL
PLATELET # BLD AUTO: 243 THOUSANDS/UL (ref 149–390)
PMV BLD AUTO: 11.3 FL (ref 8.9–12.7)
POTASSIUM SERPL-SCNC: 4.5 MMOL/L (ref 3.5–5.3)
RBC # BLD AUTO: 4.14 MILLION/UL (ref 3.81–5.12)
SODIUM SERPL-SCNC: 138 MMOL/L (ref 135–147)
TRIGL SERPL-MCNC: 46 MG/DL
WBC # BLD AUTO: 5.46 THOUSAND/UL (ref 4.31–10.16)

## 2024-06-13 PROCEDURE — 80061 LIPID PANEL: CPT

## 2024-06-13 PROCEDURE — 83036 HEMOGLOBIN GLYCOSYLATED A1C: CPT

## 2024-06-13 PROCEDURE — 80048 BASIC METABOLIC PNL TOTAL CA: CPT

## 2024-06-13 PROCEDURE — 85027 COMPLETE CBC AUTOMATED: CPT

## 2024-06-13 PROCEDURE — 84439 ASSAY OF FREE THYROXINE: CPT

## 2024-06-13 PROCEDURE — 36415 COLL VENOUS BLD VENIPUNCTURE: CPT

## 2024-06-13 PROCEDURE — 84443 ASSAY THYROID STIM HORMONE: CPT

## 2024-06-14 LAB
T4 FREE SERPL-MCNC: 0.82 NG/DL (ref 0.61–1.12)
TSH SERPL DL<=0.05 MIU/L-ACNC: 0.95 UIU/ML (ref 0.45–4.5)

## 2024-06-27 ENCOUNTER — ANESTHESIA EVENT (OUTPATIENT)
Dept: ANESTHESIOLOGY | Facility: HOSPITAL | Age: 44
End: 2024-06-27

## 2024-06-27 ENCOUNTER — ANESTHESIA (OUTPATIENT)
Dept: ANESTHESIOLOGY | Facility: HOSPITAL | Age: 44
End: 2024-06-27

## 2024-07-01 ENCOUNTER — TELEPHONE (OUTPATIENT)
Dept: PSYCHIATRY | Facility: CLINIC | Age: 44
End: 2024-07-01

## 2024-07-01 NOTE — TELEPHONE ENCOUNTER
Left detailed message for patient requesting a call back to R/S 7/17 appt with Cherie Finnegan. Provider will not be in the office. Left TEN & Ashlee numbers.

## 2024-07-03 ENCOUNTER — TELEMEDICINE (OUTPATIENT)
Dept: PSYCHIATRY | Facility: CLINIC | Age: 44
End: 2024-07-03
Payer: COMMERCIAL

## 2024-07-03 DIAGNOSIS — F43.20 ADJUSTMENT DISORDER, UNSPECIFIED TYPE: Primary | ICD-10-CM

## 2024-07-03 PROCEDURE — 90834 PSYTX W PT 45 MINUTES: CPT

## 2024-07-03 NOTE — PSYCH
Virtual Regular Visit    Verification of patient location:    Patient is located at Home in the following state in which I hold an active license [place]      Assessment/Plan:    Problem List Items Addressed This Visit       Adjustment disorder - Primary       Goals addressed in session: Goal 1          Reason for visit is No chief complaint on file.       Encounter provider Cherie Finnegan LCSW      Recent Visits  Date Type Provider Dept   07/01/24 Telephone Cherie Finnegan LCSW [facility]   Showing recent visits within past 7 days and meeting all other requirements  Today's Visits  Date Type Provider Dept   07/03/24 Telemedicine Cherie Finnegan LCSW [facility]   Showing today's visits and meeting all other requirements  Future Appointments  No visits were found meeting these conditions.  Showing future appointments within next 150 days and meeting all other requirements       The patient was identified by name and date of birth. Karine Dan was informed that this is a telemedicine visit and that the visit is being conducted throughthe [employer] platform. She agrees to proceed..  My office door was closed. No one else was in the room.  She acknowledged consent and understanding of privacy and security of the video platform. The patient has agreed to participate and understands they can discontinue the visit at any time.    Patient is aware this is a billable service.     Subjective  Karine Dan is a 44 y.o. female  .      HPI     Past Medical History:   Diagnosis Date    Abnormal Pap smear of cervix     Allergic     Seasonal    Allergic rhinitis     last assessed-12/31/2012    Anemia     Anxiety     Periodically throughout [facility]    Chronic pain syndrome 06/11/2020    SANDEEP III (cervical intraepithelial neoplasia grade III) with severe dysplasia     LEEP cone biopsy for SANDEEP-3 2/25/04.  Done at the hospital    Gestational diabetes     antepartum  condition or prior complicated delivery    Impacted cerumen of both ears     last assessed-2012    Low back pain     Oligomenorrhea     Spinal stenosis     Spinal stenosis of lumbar region without neurogenic claudication 2019    Vaginal candidiasis     last assessed-2013    Varicella     chickenpox as a child    Vertigo     last assessed-2013    Visual impairment     Wear reading glasses       Past Surgical History:   Procedure Laterality Date    CERVICAL BIOPSY  W/ LOOP ELECTRODE EXCISION  2004    Pathololgy with SANDEEP-2 to 3, with free margins but dysplasia extended to within 1mm of endocervical margin.  2004     SECTION, LOW TRANSVERSE  2009    Primary low transverse  for arrest in the active phase at 4 cm with unengaged vertex despite hours of labor    COLPOSCOPY W/ BIOPSY / CURETTAGE  2003    EPIDURAL BLOCK INJECTION N/A 2020    Procedure: L4-L5 LUMBAR EPIDURAL STEROID INJECTION (21558);  Surgeon: Elke Aguliar MD;  Location: [facility] MAIN OR;  Service: Pain Management     FOOT SURGERY Right     TOOTH EXTRACTION         Current Outpatient Medications   Medication Sig Dispense Refill    carbamide peroxide (Debrox) 6.5 % otic solution  (Patient not taking: Reported on 5/10/2024)      fluticasone (FLONASE) 50 mcg/act nasal spray 1 spray into each nostril daily      Levonorgestrel (KYLEENA IU) by Intrauterine route      loratadine (Alavert) 10 MG dissolvable tablet        No current facility-administered medications for this visit.        No Known Allergies    Review of Systems    Video Exam    There were no vitals filed for this visit.    Physical Exam     Behavioral Health Psychotherapy Progress Note    Psychotherapy Provided: Individual Psychotherapy     1. Adjustment disorder, unspecified type            Goals addressed in session: Goal 1     DATA: Karine shared she had caught a cold over her vacation.  Therapist and Karine developed a target of  "her feeling anxious when Marshall her son gets quiet.  She explored thoughts and feelings associated with this issue and processed her concerns about Marshall feeling safe enough and supported enough in his life.  She brought her VOC from a 5-6 to a 7, and her MARIAH level from a 6 to a 2 and ended session in a positive place.    During this session, this clinician used the following therapeutic modalities: EMDR (or other form of bilateral Stimulation)    Substance Abuse was not addressed during this session. If the client is diagnosed with a co-occurring substance use disorder, please indicate any changes in the frequency or amount of use: . Stage of change for addressing substance use diagnoses: No substance use/Not applicable    ASSESSMENT:  Karine Dan presents with a Euthymic/ normal mood.     her affect is Normal range and intensity, which is congruent, with her mood and the content of the session. The client has made progress on their goals.     Karine Dan presents with a none risk of suicide, none risk of self-harm, and none risk of harm to others.    For any risk assessment that surpasses a \"low\" rating, a safety plan must be developed.    A safety plan was indicated: no  If yes, describe in detail     PLAN: Between sessions, Karine Dan will utilize positive self talk for interactions with her son. At the next session, the therapist will use Client-centered Therapy and EMDR (or other form of bilateral Stimulation) to address anxiety, relationship with her son.    Behavioral Health Treatment Plan and Discharge Planning: Karine Dan is aware of and agrees to continue to work on their treatment plan. They have identified and are working toward their discharge goals. yes    Visit start and stop times:    07/03/24  Start Time: 1800  Stop Time: 1852  Total Visit Time: 52 minutes        "

## 2024-07-11 ENCOUNTER — ANESTHESIA (OUTPATIENT)
Dept: GASTROENTEROLOGY | Facility: AMBULARY SURGERY CENTER | Age: 44
End: 2024-07-11

## 2024-07-11 ENCOUNTER — ANESTHESIA EVENT (OUTPATIENT)
Dept: GASTROENTEROLOGY | Facility: AMBULARY SURGERY CENTER | Age: 44
End: 2024-07-11

## 2024-07-11 ENCOUNTER — HOSPITAL ENCOUNTER (OUTPATIENT)
Dept: GASTROENTEROLOGY | Facility: AMBULARY SURGERY CENTER | Age: 44
Setting detail: OUTPATIENT SURGERY
End: 2024-07-11
Attending: INTERNAL MEDICINE
Payer: COMMERCIAL

## 2024-07-11 VITALS
HEART RATE: 71 BPM | WEIGHT: 175 LBS | HEIGHT: 64 IN | SYSTOLIC BLOOD PRESSURE: 105 MMHG | BODY MASS INDEX: 29.88 KG/M2 | DIASTOLIC BLOOD PRESSURE: 55 MMHG | RESPIRATION RATE: 16 BRPM | OXYGEN SATURATION: 100 % | TEMPERATURE: 97.6 F

## 2024-07-11 DIAGNOSIS — Z12.11 SCREENING FOR COLON CANCER: ICD-10-CM

## 2024-07-11 PROBLEM — E66.811 CLASS 1 OBESITY IN ADULT: Status: ACTIVE | Noted: 2024-07-11

## 2024-07-11 PROBLEM — E66.9 CLASS 1 OBESITY IN ADULT: Status: ACTIVE | Noted: 2024-07-11

## 2024-07-11 LAB
EXT PREGNANCY TEST URINE: NEGATIVE
EXT. CONTROL: NORMAL

## 2024-07-11 PROCEDURE — 81025 URINE PREGNANCY TEST: CPT | Performed by: INTERNAL MEDICINE

## 2024-07-11 PROCEDURE — 88305 TISSUE EXAM BY PATHOLOGIST: CPT | Performed by: PATHOLOGY

## 2024-07-11 PROCEDURE — 45385 COLONOSCOPY W/LESION REMOVAL: CPT | Performed by: INTERNAL MEDICINE

## 2024-07-11 RX ORDER — PROPOFOL 10 MG/ML
INJECTION, EMULSION INTRAVENOUS AS NEEDED
Status: DISCONTINUED | OUTPATIENT
Start: 2024-07-11 | End: 2024-07-11

## 2024-07-11 RX ORDER — SODIUM CHLORIDE, SODIUM LACTATE, POTASSIUM CHLORIDE, CALCIUM CHLORIDE 600; 310; 30; 20 MG/100ML; MG/100ML; MG/100ML; MG/100ML
INJECTION, SOLUTION INTRAVENOUS CONTINUOUS PRN
Status: DISCONTINUED | OUTPATIENT
Start: 2024-07-11 | End: 2024-07-11

## 2024-07-11 RX ORDER — LIDOCAINE HYDROCHLORIDE 10 MG/ML
INJECTION, SOLUTION EPIDURAL; INFILTRATION; INTRACAUDAL; PERINEURAL AS NEEDED
Status: DISCONTINUED | OUTPATIENT
Start: 2024-07-11 | End: 2024-07-11

## 2024-07-11 RX ADMIN — PROPOFOL 20 MG: 10 INJECTION, EMULSION INTRAVENOUS at 12:30

## 2024-07-11 RX ADMIN — PROPOFOL 50 MG: 10 INJECTION, EMULSION INTRAVENOUS at 12:23

## 2024-07-11 RX ADMIN — PROPOFOL 50 MG: 10 INJECTION, EMULSION INTRAVENOUS at 12:21

## 2024-07-11 RX ADMIN — PROPOFOL 100 MG: 10 INJECTION, EMULSION INTRAVENOUS at 12:20

## 2024-07-11 RX ADMIN — LIDOCAINE HYDROCHLORIDE 50 MG: 10 INJECTION, SOLUTION EPIDURAL; INFILTRATION; INTRACAUDAL at 12:20

## 2024-07-11 RX ADMIN — SODIUM CHLORIDE, SODIUM LACTATE, POTASSIUM CHLORIDE, AND CALCIUM CHLORIDE: .6; .31; .03; .02 INJECTION, SOLUTION INTRAVENOUS at 11:49

## 2024-07-11 RX ADMIN — PROPOFOL 30 MG: 10 INJECTION, EMULSION INTRAVENOUS at 12:25

## 2024-07-11 RX ADMIN — PROPOFOL 50 MG: 10 INJECTION, EMULSION INTRAVENOUS at 12:22

## 2024-07-11 NOTE — ANESTHESIA PREPROCEDURE EVALUATION
Procedure:  COLONOSCOPY    Relevant Problems   ANESTHESIA (within normal limits)      CARDIO (within normal limits)      ENDO (within normal limits)      MUSCULOSKELETAL   (+) Chronic bilateral low back pain with bilateral sciatica      NEURO/PSYCH   (+) Chronic bilateral low back pain with bilateral sciatica   (+) Chronic pain syndrome      PULMONARY (within normal limits)      Behavioral Health   (+) Adjustment disorder      Other   (+) Class 1 obesity in adult        Physical Exam    Airway    Mallampati score: II  TM Distance: >3 FB  Neck ROM: full     Dental   No notable dental hx     Cardiovascular      Pulmonary      Other Findings  post-pubertal.      Anesthesia Plan  ASA Score- 2     Anesthesia Type- IV sedation with anesthesia with ASA Monitors.         Additional Monitors:     Airway Plan:            Plan Factors-Exercise tolerance (METS): >4 METS.    Chart reviewed.   Existing labs reviewed. Patient summary reviewed.    Patient is not a current smoker.              Induction-     Postoperative Plan-         Informed Consent- Anesthetic plan and risks discussed with patient.  I personally reviewed this patient with the CRNA. Discussed and agreed on the Anesthesia Plan with the CRNA..

## 2024-07-11 NOTE — ANESTHESIA POSTPROCEDURE EVALUATION
Post-Op Assessment Note    CV Status:  Stable    Pain management: adequate       Mental Status:  Alert and awake   Hydration Status:  Euvolemic   PONV Controlled:  Controlled   Airway Patency:  Patent     Post Op Vitals Reviewed: Yes    No anethesia notable event occurred.    Staff: CRNA               BP   101/51   Temp   97.6   Pulse  83   Resp   15   SpO2   100

## 2024-07-11 NOTE — H&P
History and Physical -  [facility]  Karine Dan 44 y.o. female MRN: [id number]    HPI: Karine Dan is a 44 y.o. year old female who presents for screening colonoscopy      Review of Systems    Historical Information   Past Medical History:   Diagnosis Date    Abnormal Pap smear of cervix     Allergic     Seasonal    Allergic rhinitis     last assessed-2012    Anemia     Anxiety     Periodically throughout [facility]    Chronic pain syndrome 2020    SANDEEP III (cervical intraepithelial neoplasia grade III) with severe dysplasia     LEEP cone biopsy for SANDEEP-3 04.  Done at the hospital    Gestational diabetes     antepartum condition or prior complicated delivery    Impacted cerumen of both ears     last assessed-2012    Low back pain     Oligomenorrhea     Spinal stenosis     Spinal stenosis of lumbar region without neurogenic claudication 2019    Vaginal candidiasis     last assessed-2013    Varicella     chickenpox as a child    Vertigo     last assessed-2013    Visual impairment     Wear reading glasses     Past Surgical History:   Procedure Laterality Date    CERVICAL BIOPSY  W/ LOOP ELECTRODE EXCISION  2004    Pathololgy with SANDEEP-2 to 3, with free margins but dysplasia extended to within 1mm of endocervical margin.  2004     SECTION, LOW TRANSVERSE  2009    Primary low transverse  for arrest in the active phase at 4 cm with unengaged vertex despite hours of labor    COLPOSCOPY W/ BIOPSY / CURETTAGE  2003    EPIDURAL BLOCK INJECTION N/A 2020    Procedure: L4-L5 LUMBAR EPIDURAL STEROID INJECTION (23728);  Surgeon: Elke Aguilar MD;  Location: [facility] MAIN OR;  Service: Pain Management     FOOT SURGERY Right     TOOTH EXTRACTION       Social History   Social History     Substance and Sexual Activity   Alcohol Use Yes    Alcohol/week: 4.0 standard drinks of alcohol    Types: 4 Glasses of wine per week     Social  "History     Substance and Sexual Activity   Drug Use No     Social History     Tobacco Use   Smoking Status Never   Smokeless Tobacco Never     Family History   Problem Relation Age of Onset    Skin cancer Mother     Hypertension Mother     Arthritis Mother     Anxiety disorder Mother     Hypertension Father     Kidney failure Father     Depression Father         Not official diagnosed    Heart disease Father         CHF    Drug abuse Father         Marijuana    Hypertension Maternal Grandmother     Lung cancer Maternal Grandmother     Arthritis Maternal Grandmother     Cancer Maternal Grandmother         Lung    Anxiety disorder Maternal Grandmother         Not official diagnosed    Cancer Maternal Grandfather         Bladder    Hypertension Maternal Grandfather     Alcohol abuse Maternal Grandfather     Hearing loss Maternal Grandfather     Lung cancer Paternal Grandmother     Cancer Paternal Grandmother         Lung    Heart disease Paternal Grandfather     No Known Problems Brother     No Known Problems Son     Alcohol abuse Maternal Aunt     Drug abuse Maternal Aunt         Various substances    Anxiety disorder Maternal Aunt         Not official diagnosed    No Known Problems Paternal Aunt     No Known Problems Paternal Uncle     No Known Problems Paternal Uncle     No Known Problems Paternal Uncle     Alcohol abuse Brother     Depression Brother         Not official diagnosed    Hypertension Brother     Anxiety disorder Brother     Drug abuse Brother         Various substances       Meds/Allergies     Not in a hospital admission.    No Known Allergies    Objective     /59   Pulse 95   Temp (!) 97.2 °F (36.2 °C) (Temporal)   Resp 18   Ht 5' 4\" (1.626 m)   Wt 79.4 kg (175 lb)   SpO2 100%   BMI 30.04 kg/m²       PHYSICAL EXAM    Gen: NAD  CV: RRR  CHEST: Clear  ABD: soft, NT/ND  EXT: no edema  Neuro: AAO      ASSESSMENT/PLAN:  This is a 44 y.o. year old female here for screening colonoscopy    PLAN: "   Procedure: colonoscopy

## 2024-07-16 PROCEDURE — 88305 TISSUE EXAM BY PATHOLOGIST: CPT | Performed by: PATHOLOGY

## 2024-08-07 ENCOUNTER — TELEMEDICINE (OUTPATIENT)
Dept: PSYCHIATRY | Facility: CLINIC | Age: 44
End: 2024-08-07
Payer: COMMERCIAL

## 2024-08-07 ENCOUNTER — DOCUMENTATION (OUTPATIENT)
Dept: PSYCHIATRY | Facility: CLINIC | Age: 44
End: 2024-08-07

## 2024-08-07 DIAGNOSIS — F43.20 ADJUSTMENT DISORDER, UNSPECIFIED TYPE: Primary | ICD-10-CM

## 2024-08-07 PROCEDURE — 90832 PSYTX W PT 30 MINUTES: CPT

## 2024-08-07 NOTE — BH TREATMENT PLAN
Outpatient Behavioral Health Psychotherapy Treatment Plan    Karine Dan  1980     Date of Initial Psychotherapy Assessment: 2/1/2023   Date of Current Treatment Plan: 08/07/24  Treatment Plan Target Date: 8/07/2025  Treatment Plan Expiration Date: 2/7/2025    Diagnosis:   1. Adjustment disorder, unspecified type              Area(s) of Need: Want to take reflection about my parents and balance honoring my feelings as well as being a parent.    Long Term Goal 1 (in the client's own words): I want to honor and have my own feelings while balancing them in my responsibilities as parent, wife and friend.    Stage of Change: Contemplation    Target Date for completion: 8/16/2024     Anticipated therapeutic modalities: Supportive therapy, CBT, EMDR     People identified to complete this goal: sherly Milton      Objective 1: (identify the means of measuring success in meeting the objective): Therapist will engage Karine in supportive therapy as well as CBT and EMDR to process past events and relationships to navigate current relationships and issues.  Therapist will encourage Karine to utilize coping skills for improving her relationships and emotions. Karine will utilize coping skills consistently in 8 out of 10 situations.     Update:  Karine shared she is using her coping skills 95% of the time after some reflection.  She will work on paying attention to herself and respond rather than react.    Update 8/7/2024:  Karine shared she is doing well and uses her coping strategies at least 95% of the time.       I am currently under the care of a [facility] psychiatric provider: no    My [facility] psychiatric provider is: n/a    I am currently taking psychiatric medications: No    I feel that I will be ready for discharge from mental health care when I reach the following (measurable goal/objective): When I can successfully navigate my relationships and feel emotionally regulated in at least 8 out of 10  situations    For children and adults who have a legal guardian:   Has there been any change to custody orders and/or guardianship status? NA. If yes, attach updated documentation.    I have updated my Crisis Plan and have been offered a copy of this plan    Behavioral Health Treatment Plan [facility]: Diagnosis and Treatment Plan explained to Karine Dan acknowledges an understanding of their diagnosis. Karine Dan agrees to this treatment plan.    I have been offered a copy of this Treatment Plan. Yes    Karine Dan, 1980, actively participated in the review and update of this treatment plan during a virtual session, using the [employer] platform.   Karine Dan  provided verbal consent on 8/7/2024 at 6:43 PM. The treatment plan was transcribed into the Electronic Health Record at a later time.

## 2024-08-07 NOTE — PSYCH
Virtual Regular Visit    Verification of patient location:    Patient is located at Home in the following state in which I hold an active license [place]      Assessment/Plan:    Problem List Items Addressed This Visit       Adjustment disorder - Primary       Goals addressed in session: Goal 1          Reason for visit is No chief complaint on file.       Encounter provider Cherie Finnegan LCSW      Recent Visits  No visits were found meeting these conditions.  Showing recent visits within past 7 days and meeting all other requirements  Today's Visits  Date Type Provider Dept   08/07/24 Telemedicine Cherie Finnegan LCSW [facility]   Showing today's visits and meeting all other requirements  Future Appointments  No visits were found meeting these conditions.  Showing future appointments within next 150 days and meeting all other requirements       The patient was identified by name and date of birth. Karine Dan was informed that this is a telemedicine visit and that the visit is being conducted throughthe [employer] platform. She agrees to proceed..  My office door was closed. No one else was in the room.  She acknowledged consent and understanding of privacy and security of the video platform. The patient has agreed to participate and understands they can discontinue the visit at any time.    Patient is aware this is a billable service.     Subjective  Karine Dan is a 44 y.o. female  .      HPI     Past Medical History:   Diagnosis Date    Abnormal Pap smear of cervix     Allergic     Seasonal    Allergic rhinitis     last assessed-12/31/2012    Anemia     Anxiety     Periodically throughout [facility]    Chronic pain syndrome 06/11/2020    SANDEEP III (cervical intraepithelial neoplasia grade III) with severe dysplasia     LEEP cone biopsy for SANDEEP-3 2/25/04.  Done at the hospital    Gestational diabetes     antepartum condition or prior complicated delivery    Impacted cerumen of both  ears     last assessed-2012    Low back pain     Oligomenorrhea     Spinal stenosis     Spinal stenosis of lumbar region without neurogenic claudication 2019    Vaginal candidiasis     last assessed-2013    Varicella     chickenpox as a child    Vertigo     last assessed-2013    Visual impairment     Wear reading glasses       Past Surgical History:   Procedure Laterality Date    CERVICAL BIOPSY  W/ LOOP ELECTRODE EXCISION  2004    Pathololgy with SANDEEP-2 to 3, with free margins but dysplasia extended to within 1mm of endocervical margin.  2004     SECTION, LOW TRANSVERSE  2009    Primary low transverse  for arrest in the active phase at 4 cm with unengaged vertex despite hours of labor    COLPOSCOPY W/ BIOPSY / CURETTAGE  2003    EPIDURAL BLOCK INJECTION N/A 2020    Procedure: L4-L5 LUMBAR EPIDURAL STEROID INJECTION (12226);  Surgeon: Elke Aguilar MD;  Location: [facility] MAIN OR;  Service: Pain Management     FOOT SURGERY Right     TOOTH EXTRACTION         Current Outpatient Medications   Medication Sig Dispense Refill    carbamide peroxide (Debrox) 6.5 % otic solution  (Patient not taking: Reported on 5/10/2024)      fluticasone (FLONASE) 50 mcg/act nasal spray 1 spray into each nostril daily      Levonorgestrel (KYLEENA IU) by Intrauterine route      loratadine (Alavert) 10 MG dissolvable tablet        No current facility-administered medications for this visit.        No Known Allergies    Review of Systems    Video Exam    There were no vitals filed for this visit.    Physical Exam     Visit Time  Behavioral Health Psychotherapy Progress Note    Psychotherapy Provided: Individual Psychotherapy     1. Adjustment disorder, unspecified type            Goals addressed in session: Goal 1     DATA: Karine shared that she had a nice trip with friends to the beach, and has been feeling better about her relationship with her son and is finding a good  "balance of being involved with her son and giving him independence.  She and therapist talked about the direction of therapy and she said she is ready to be discharged at this time because she is doing well.  Therapist agreed to discharge her from therapy and left contact information for her for reconnecting if needed.  During this session, this clinician used the following therapeutic modalities: Client-centered Therapy    Substance Abuse was not addressed during this session. If the client is diagnosed with a co-occurring substance use disorder, please indicate any changes in the frequency or amount of use: . Stage of change for addressing substance use diagnoses: No substance use/Not applicable    ASSESSMENT:  Karine Dan presents with a Euthymic/ normal mood.     her affect is Normal range and intensity, which is congruent, with her mood and the content of the session. The client has made progress on their goals.     Karine Dan presents with a none risk of suicide, none risk of self-harm, and none risk of harm to others.    For any risk assessment that surpasses a \"low\" rating, a safety plan must be developed.    A safety plan was indicated: no  If yes, describe in detail     PLAN: Between sessions, Karine Dan will utilize coping skills as needed. At the next session, the therapist will use  client will be discharged from therapy  to address .    Behavioral Health Treatment Plan and Discharge Planning: Karine Dan is aware of and agrees to continue to work on their treatment plan. They have identified and are working toward their discharge goals. yes    Visit start and stop times:    08/07/24  Start Time: 1700  Stop Time: 1731  Total Visit Time: 31 minutes        "

## 2024-08-16 ENCOUNTER — HOSPITAL ENCOUNTER (OUTPATIENT)
Dept: RADIOLOGY | Facility: IMAGING CENTER | Age: 44
End: 2024-08-16
Payer: COMMERCIAL

## 2024-08-16 VITALS — HEIGHT: 64 IN | WEIGHT: 178 LBS | BODY MASS INDEX: 30.39 KG/M2

## 2024-08-16 DIAGNOSIS — Z12.31 ENCOUNTER FOR SCREENING MAMMOGRAM FOR BREAST CANCER: ICD-10-CM

## 2024-08-16 PROCEDURE — 77063 BREAST TOMOSYNTHESIS BI: CPT

## 2024-08-16 PROCEDURE — 77067 SCR MAMMO BI INCL CAD: CPT

## 2024-08-27 DIAGNOSIS — R92.30 INCONCLUSIVE MAMMOGRAM DUE TO DENSE BREASTS: Primary | ICD-10-CM

## 2024-08-27 DIAGNOSIS — Z12.39 SCREENING FOR BREAST CANCER USING NON-MAMMOGRAM MODALITY: ICD-10-CM

## 2024-08-27 DIAGNOSIS — R92.2 INCONCLUSIVE MAMMOGRAM DUE TO DENSE BREASTS: Primary | ICD-10-CM

## 2024-09-04 NOTE — PROGRESS NOTES
Psychotherapy Discharge Summary    Preferred Name: Karine Dan  YOB: 1980    Admission date to psychotherapy: 1/27/2023    Referred by: self    Presenting Problem: grief reaction, adjustment disorder    Course of treatment included : individual therapy     Progress/Outcome of Treatment Goals (brief summary of course of treatment) Karine achieved her therapy goals    Treatment Complications (if any): none    Treatment Progress: excellent    Current [facility] Psychiatric Provider: none    Discharge Medications include: n/a    Discharge Date: 9/4/2024    Discharge Diagnosis:   1. Adjustment disorder, unspecified type            Criteria for Discharge: completed treatment goals and objectives and is no longer in need of services    Aftercare recommendations include (include specific referral names and phone numbers, if appropriate): n/a    Prognosis: excellent

## 2024-10-07 ENCOUNTER — OFFICE VISIT (OUTPATIENT)
Dept: FAMILY MEDICINE CLINIC | Facility: CLINIC | Age: 44
End: 2024-10-07
Payer: COMMERCIAL

## 2024-10-07 VITALS
HEIGHT: 64 IN | BODY MASS INDEX: 31.07 KG/M2 | SYSTOLIC BLOOD PRESSURE: 110 MMHG | DIASTOLIC BLOOD PRESSURE: 68 MMHG | OXYGEN SATURATION: 98 % | RESPIRATION RATE: 16 BRPM | TEMPERATURE: 97.6 F | WEIGHT: 182 LBS | HEART RATE: 69 BPM

## 2024-10-07 DIAGNOSIS — L30.9 ECZEMA OF SCALP: Primary | ICD-10-CM

## 2024-10-07 PROCEDURE — 99213 OFFICE O/P EST LOW 20 MIN: CPT | Performed by: FAMILY MEDICINE

## 2024-10-07 RX ORDER — TRIAMCINOLONE ACETONIDE 0.15 MG/G
AEROSOL, SPRAY TOPICAL 2 TIMES DAILY
Qty: 100 G | Refills: 1 | Status: SHIPPED | OUTPATIENT
Start: 2024-10-07

## 2024-10-07 RX ORDER — TRIAMCINOLONE ACETONIDE 1 MG/G
CREAM TOPICAL 2 TIMES DAILY
Qty: 28.4 G | Refills: 0 | Status: SHIPPED | OUTPATIENT
Start: 2024-10-07

## 2024-10-07 NOTE — PROGRESS NOTES
"Ambulatory Visit  Name: Karine Dan      : 1980      MRN: [id number]  Encounter Provider: Mahendra Jeffery MD  Encounter Date: 10/7/2024   Encounter department: [facility]    Assessment & Plan  Eczema of scalp  Eczema of the scalp.  Will prescribe topical spray twice daily.  If no improvement can try Kenalog cream.  I do recommend patient establish with dermatology  Orders:    triamcinolone (KENALOG) 0.147 MG/GM topical spray; Apply topically 2 (two) times a day    triamcinolone (KENALOG) 0.1 % cream; Apply topically 2 (two) times a day       History of Present Illness     Flare up of eczema dermatitis.           Review of Systems   Skin:  Positive for color change and rash.           Objective     /68 (BP Location: Left arm, Patient Position: Sitting, Cuff Size: Standard)   Pulse 69   Temp 97.6 °F (36.4 °C)   Resp 16   Ht 5' 4\" (1.626 m)   Wt 82.6 kg (182 lb)   SpO2 98%   BMI 31.24 kg/m²     Physical Exam  Skin:            Comments: Eczema          "

## 2025-04-29 ENCOUNTER — OFFICE VISIT (OUTPATIENT)
Age: 45
End: 2025-04-29

## 2025-04-29 VITALS — WEIGHT: 182 LBS | HEIGHT: 64 IN | BODY MASS INDEX: 31.07 KG/M2

## 2025-04-29 DIAGNOSIS — L82.1 SEBORRHEIC KERATOSIS: ICD-10-CM

## 2025-04-29 DIAGNOSIS — D48.9 NEOPLASM OF UNCERTAIN BEHAVIOR: ICD-10-CM

## 2025-04-29 DIAGNOSIS — L81.4 LENTIGO: ICD-10-CM

## 2025-04-29 DIAGNOSIS — D18.01 CHERRY ANGIOMA: ICD-10-CM

## 2025-04-29 DIAGNOSIS — D22.9 MULTIPLE MELANOCYTIC NEVI: ICD-10-CM

## 2025-04-29 DIAGNOSIS — L20.9 ATOPIC DERMATITIS, UNSPECIFIED TYPE: ICD-10-CM

## 2025-04-29 DIAGNOSIS — Z12.83 SCREENING FOR SKIN CANCER: Primary | ICD-10-CM

## 2025-04-29 PROCEDURE — 88305 TISSUE EXAM BY PATHOLOGIST: CPT | Performed by: PATHOLOGY

## 2025-04-29 PROCEDURE — 88342 IMHCHEM/IMCYTCHM 1ST ANTB: CPT | Performed by: PATHOLOGY

## 2025-04-29 PROCEDURE — 88341 IMHCHEM/IMCYTCHM EA ADD ANTB: CPT | Performed by: PATHOLOGY

## 2025-04-29 NOTE — PROGRESS NOTES
"[facility] Note     Patient Name: Karine Dan  Encounter Date: 4/29/25       Have you been cared for by a [facility] Dermatologist in the last 3 years and, if so, which description applies to you? NO. I am considered a \"new\" patient and must complete all patient intake questions. I am of child-bearing potential.     REVIEW OF SYSTEMS:  Have you recently had or currently have any of the following? Recent fever or chills? {YES/NO W/DESCRIPTION:90949}  Any non-healing wound? {YES/NO W/DESCRIPTION:78986}  Are you pregnant or planning to become pregnant? {YES/NO W/DESCRIPTION:24408}  Are you currently or planning to be nursing or breast feeding? {YES/NO W/DESCRIPTION:28002}   PAST MEDICAL HISTORY:  Have you personally ever had or currently have any of the following?  If \"YES,\" then please provide more detail. Skin cancer (such as Melanoma, Basal Cell Carcinoma, Squamous Cell Carcinoma?  {YES/NO W/DESCRIPTION:93621}  Tuberculosis, HIV/AIDS, Hepatitis B or C: {YES/NO W/DESCRIPTION:02654}  Radiation Treatment {YES/NO W/DESCRIPTION:19646}   HISTORY OF IMMUNOSUPPRESSION:   Do you have a history of any of the following:  Systemic Immunosuppression such as Diabetes, Biologic or Immunotherapy, Chemotherapy, Organ Transplantation, Bone Marrow Transplantation or Prednsione?  {YES/NO W/DESCRIPTION:80821}    Answering \"YES\" requires the addition of the dotphrase \"IMMUNOSUPPRESSED\" as the first diagnosis of the patient's visit.   FAMILY HISTORY:  Any \"first degree relatives\" (parent, brother, sister, or child) with the following?    Skin Cancer, Pancreatic or Other Cancer? {YES/NO W/DESCRIPTION:56892}   PATIENT EXPERIENCE:    Do you want the Dermatologist to perform a COMPLETE skin exam today including a clinical examination under the \"bra and underwear\" areas?  {SL AMB DERM YES/RED No:41185}  If necessary, do we have your permission to call and leave a detailed message on your Preferred Phone number that " "includes your specific medical information?  {SL AMB DERM YES/RED No:09609}      No Known Allergies   Current Outpatient Medications:     carbamide peroxide (Debrox) 6.5 % otic solution, , Disp: , Rfl:     fluticasone (FLONASE) 50 mcg/act nasal spray, 1 spray into each nostril daily, Disp: , Rfl:     Levonorgestrel (KYLEENA IU), by Intrauterine route, Disp: , Rfl:     loratadine (Alavert) 10 MG dissolvable tablet, , Disp: , Rfl:     triamcinolone (KENALOG) 0.1 % cream, Apply topically 2 (two) times a day, Disp: 28.4 g, Rfl: 0    triamcinolone (KENALOG) 0.147 MG/GM topical spray, Apply topically 2 (two) times a day, Disp: 100 g, Rfl: 1              Whom besides the patient is providing clinical information about today's encounter?   NO ADDITIONAL HISTORIAN (patient alone provided history)    Physical Exam and Assessment/Plan by Diagnosis:    Patient presents today for a skin exam. Patient has       GARCIA ANGIOMAS     Physical Exam:  Anatomic Location Affected:  Trunk and extremities  Morphological Description:  Scattered cherry red papules  Denies pain, itch, bleeding. No treatments tried. Present for years. Present constantly; no modifying factors which make it worse or better.     Assessment and Plan:  Based on a thorough discussion of this condition and the management approach to it (including a comprehensive discussion of the known risks, side effects and potential benefits of treatment), the patient (family) agrees to implement the following specific plan:  Reassure benign        SEBORRHEIC KERATOSIS; NON-INFLAMED     Physical Exam:  Anatomic Location Affected:  Trunk and extremities  Morphological Description:  Waxy, smooth to warty textured, yellow to brownish-grey to dark brown to blackish, discrete, \"stuck-on\" appearing papules.  Present for years. Denies pain, itch, bleeding.      Additional History of Present Condition:  Present constantly; no modifying factors which make it worse or better. No prior " "treatment.       Assessment and Plan:  Based on a thorough discussion of this condition and the management approach to it (including a comprehensive discussion of the known risks, side effects and potential benefits of treatment), the patient (family) agrees to implement the following specific plan:  Reassure benign  Use sun protection.  Apply SPF 30 or higher at least three times a day.  Wear sun protecting clothing and hats.        SOLAR LENTIGINES   OTHER SKIN CHANGES DUE TO CHRONIC EXPOSURE TO NONIONIZING RADIATION     Physical Exam:  Anatomic Location Affected:  Sun exposed areas of back, chest, arms, legs  Morphological Description:  Multiple scattered brown to tan evenly pigmented macules   Denies pain, itch, bleeding. No treatments tried. Present for months - years. Reports getting newer lesions with sun exposure.         Assessment and Plan:  Based on a thorough discussion of this condition and the management approach to it (including a comprehensive discussion of the known risks, side effects and potential benefits of treatment), the patient (family) agrees to implement the following specific plan:  Reassure benign  Use sun protection.  Apply SPF 30 or higher at least three times a day.  Wear sun protecting clothing and hats.         MULTIPLE MELANOCYTIC NEVI (\"Moles\")     Physical Exam:  Anatomic Location Affected: Trunk and extremities  Morphological Description:  Scattered, round to ovoid, symmetrical-appearing, even bordered, skin colored to dark brown macules/papules  Denies pain, itch, bleeding. No treatments tried. Present for years. Present constantly; no modifying factors which make it worse or better. Denies actively changing or growing moles.      Assessment and Plan:  Based on a thorough discussion of this condition and the management approach to it (including a comprehensive discussion of the known risks, side effects and potential benefits of treatment), the patient (family) agrees to " implement the following specific plan:  Reassure benign  Monitor for changes  Use sun protection.  Apply SPF 30 or higher at least three times a day.  Wear sun protecting clothing and hats.       Worrisome signs of skin malignancy discussed, questions answered. Regular self-skin check discussed. Advised to call or return to office if patient notices any spots of concern, rapidly growing/changing lesions, bleeding lesions, non-healing lesions. Advised regular SPF use.      Scribe Attestation      I,:  Anisa Vora am acting as a scribe while in the presence of the attending physician.:       I,:  ELENA Weiner personally performed the services described in this documentation    as scribed in my presence.:

## 2025-04-29 NOTE — PROGRESS NOTES
"[facility] Note     Patient Name: Karine Dan  Encounter Date: 4/29/2025       Have you been cared for by a [facility] Dermatologist in the last 3 years and, if so, which description applies to you? NO. I am considered a \"new\" patient and must complete all patient intake questions. I am not of child-bearing potential.     REVIEW OF SYSTEMS:  Have you recently had or currently have any of the following? Recent fever or chills? No  Any non-healing wound? No   PAST MEDICAL HISTORY:  Have you personally ever had or currently have any of the following?  If \"YES,\" then please provide more detail. Skin cancer (such as Melanoma, Basal Cell Carcinoma, Squamous Cell Carcinoma?  No  Tuberculosis, HIV/AIDS, Hepatitis B or C: No  Radiation Treatment No   HISTORY OF IMMUNOSUPPRESSION:   Do you have a history of any of the following:  Systemic Immunosuppression such as Diabetes, Biologic or Immunotherapy, Chemotherapy, Organ Transplantation, Bone Marrow Transplantation or Prednisone?  No     Answering \"YES\" requires the addition of the dotphrase \"IMMUNOSUPPRESSED\" as the first diagnosis of the patient's visit.   FAMILY HISTORY:  Any \"first degree relatives\" (parent, brother, sister, or child) with the following?    Skin Cancer, Pancreatic or Other Cancer? YES, Mother had skin cancer, patient unsure what kind   PATIENT EXPERIENCE:    Do you want the Dermatologist to perform a COMPLETE skin exam today including a clinical examination under the \"bra and underwear\" areas?  NO, did not examine under the bra or underwear   If necessary, do we have your permission to call and leave a detailed message on your Preferred Phone number that includes your specific medical information?  Yes      No Known Allergies   Current Outpatient Medications:     carbamide peroxide (Debrox) 6.5 % otic solution, , Disp: , Rfl:     fluticasone (FLONASE) 50 mcg/act nasal spray, 1 spray into each nostril daily, Disp: , Rfl:     " "Levonorgestrel (KYLEENA IU), by Intrauterine route, Disp: , Rfl:     loratadine (Alavert) 10 MG dissolvable tablet, , Disp: , Rfl:     triamcinolone (KENALOG) 0.1 % cream, Apply topically 2 (two) times a day, Disp: 28.4 g, Rfl: 0    triamcinolone (KENALOG) 0.147 MG/GM topical spray, Apply topically 2 (two) times a day, Disp: 100 g, Rfl: 1              Whom besides the patient is providing clinical information about today's encounter?   NO ADDITIONAL HISTORIAN (patient alone provided history)    Physical Exam and Assessment/Plan by Diagnosis:      NEOPLASM OF UNCERTAIN BEHAVIOR OF SKIN    Physical Exam:  (Anatomic Location); (Size and Morphological Description); (Differential Diagnosis):  Right dorsal hand; 3 mm x 2 mm brown macule; MM vs Atypia         Additional History of Present Condition:  present on exam; patient reports that she has a dark spot on her right hand that appeared in September 2024; she notes that it started out as a small red dot that slowly grew and changed in size & color.     Assessment and Plan:  I have discussed with the patient that a sample of skin via a \"skin biopsy” would be potentially helpful to further make a specific diagnosis under the microscope.  Based on a thorough discussion of this condition and the management approach to it (including a comprehensive discussion of the known risks, side effects and potential benefits of treatment), the patient (family) agrees to implement the following specific plan:    Procedure:  Skin Biopsy.  After a thorough discussion of treatment options and risk/benefits/alternatives (including but not limited to local pain, scarring, dyspigmentation, blistering, possible superinfection, and inability to confirm a diagnosis via histopathology), verbal and written consent were obtained and portion of the rash was biopsied for tissue sample.  See below for consent that was obtained from patient and subsequent Procedure Note.     PROCEDURE TANGENTIAL (SHAVE) " "BIOPSY NOTE:    Performing Physician:  Howard Baltazar  Anatomic Location; Clinical Description with size (cm); Pre-Op Diagnosis:   Right dorsal hand; 3 mm x 2 mm brown macule; MM vs Atypia   Post-op diagnosis: Same     Local anesthesia: 3:1 1% xylocaine with epi and 1-100,000 buffered     Topical anesthesia: None    Hemostasis: Aluminum chloride       After obtaining informed consent  at which time there was a discussion about the purpose of biopsy  and low risks of infection and bleeding.  The area was prepped and draped in the usual fashion. Anesthesia was obtained with 1% lidocaine with epinephrine. A shave biopsy to an appropriate sampling depth was obtained by Shave (Dermablade or 15 blade) The resulting wound was covered with surgical ointment and bandaged appropriately.     The patient tolerated the procedure well without complications and was without signs of functional compromise.      Specimen has been sent for review by Dermatopathology.    Standard post-procedure care has been explained and has been included in written form within the patient's copy of Informed Consent.    INFORMED CONSENT DISCUSSION AND POST-OPERATIVE INSTRUCTIONS FOR PATIENT    I.  RATIONALE FOR PROCEDURE  I understand that a skin biopsy allows the Dermatologist to test a lesion or rash under the microscope to obtain a diagnosis.  It usually involves numbing the area with numbing medication and removing a small piece of skin; sometimes the area will be closed with sutures. In this specific procedure, sutures are not usually needed.  If any sutures are placed, then they are usually need to be removed in 2 weeks or less.    I understand that my Dermatologist recommends that a skin \"shave\" biopsy be performed today.  A local anesthetic, similar to the kind that a dentist uses when filling a cavity, will be injected with a very small needle into the skin area to be sampled.  The injected skin and tissue underneath \"will go to sleep” and " "become numb so no pain should be felt afterwards.  An instrument shaped like a tiny \"razor blade\" (shave biopsy instrument) will be used to cut a small piece of tissue and skin from the area so that a sample of tissue can be taken and examined more closely under the microscope.  A slight amount of bleeding will occur, but it will be stopped with direct pressure and a pressure bandage and any other appropriate methods.  I understands that a scar will form where the wound was created.  Surgical ointment will be applied to help protect the wound.  Sutures are not usually needed.    II.  RISKS AND POTENTIAL COMPLICATIONS   I understand the risks and potential complications of a skin biopsy include but are not limited to the following:  Bleeding  Infection  Pain  Scar/keloid  Skin discoloration  Incomplete Removal  Recurrence  Nerve Damage/Numbness/Loss of Function  Allergic Reaction to Anesthesia  Biopsies are diagnostic procedures and based on findings additional treatment or evaluation may be required  Loss or destruction of specimen resulting in no additional findings    My Dermatologist has explained to me the nature of the condition, the nature of the procedure, and the benefits to be reasonably expected compared with alternative approaches.  My Dermatologist has discussed the likelihood of major risks or complications of this procedure including the specific risks listed above, such as bleeding, infection, and scarring/keloid.  I understand that a scar is expected after this procedure.  I understand that my physician cannot predict if the scar will form a \"keloid,\" which extends beyond the borders of the wound that is created.  A keloid is a thick, painful, and bumpy scar.  A keloid can be difficult to treat, as it does not always respond well to therapy, which includes injecting cortisone directly into the keloid every few weeks.  While this usually reduces the pain and size of the scar, it does not eliminate it. " "     I understand that photographs may be taken before and after the procedure.  These will be maintained as part of the medical providers confidential records and may not be made available to me.  I further authorize the medical provider to use the photographs for teaching purposes or to illustrate scientific papers, books, or lectures if in his/her judgment, medical research, education, or science may benefit from its use.    I have had an opportunity to fully inquire about the risks and benefits of this procedure and its alternatives.   I have been given ample time and opportunity to ask questions and to seek a second opinion if I wished to do so.  I acknowledge that there have specifically been no guarantees as to the cosmetic results from the procedure.  I am aware that with any procedure there is always the possibility of an unexpected complication.    III. POST-PROCEDURAL CARE (WHAT YOU WILL NEED TO DO \"AFTER THE BIOPSY\" TO OPTIMIZE HEALING)    Keep the area clean and dry.  Try NOT to remove the bandage or get it wet for the first 24 hours.    Gently clean the area and apply surgical ointment (such as Vaseline petrolatum ointment, which is available \"over the counter\" and not a prescription) to the biopsy site for up to 2 weeks straight.  This acts to protect the wound from the outside world.      Sutures are not usually placed in this procedure.  If any sutures were placed, return for suture removal as instructed (generally 1 week for the face, 2 weeks for the body).      Take Acetaminophen (Tylenol) for discomfort, if no contraindications.  Ibuprofen or aspirin could make bleeding worse.    Call our office immediately for signs of infection: fever, chills, increased redness, warmth, tenderness, discomfort/pain, or pus or foul smell coming from the wound.    WHAT TO DO IF THERE IS ANY BLEEDING?  If a small amount of bleeding is noticed, place a clean cloth over the area and apply firm pressure for ten " minutes.  Check the wound after 10 minutes of direct pressure.  If bleeding persists, try one more time for an additional 10 minutes of direct pressure on the area.  If the bleeding becomes heavier or does not stop after the second attempt, or if you have any other questions about this procedure, then please call your [facility]at[facility] by calling [phone] (SKIN).     I hereby acknowledge that I have reviewed and verified the site with my Dermatologist and have requested and authorized my Dermatologist to proceed with the procedure.    ATOPIC DERMATITIS (ECZEMA)     Physical exam:  notable for xerotic plaques behind ears.       Assessment/Plan:    History and physical consistent with atopic dermatitis  Educated patient on atopic dermatitis, including natural progression of disease with expected periods of quiescence and flaring.    Discussed treatment options including general skin care recommendations, topical anti-inflammatories (steroids, calcineurin inhibitors), dupixent.  Based on a thorough discussion of this condition and the management approach to it (including a comprehensive discussion of the known risks, side effects and potential benefits of treatment), the patient (family) agrees to implement the following specific plan:        ALWAYS APPLY ANY PRESCRIBED MEDICINE TO THE SKIN FIRST. THEN APPLY A MOISTURIZER    MAINTENANCE - Apply at least 2 times a day every day to all skin even if skin is “normal,” or without scale/redness. Apply once after bath and once any other time during the day. Best to apply moisturizer to moist skin after bath (do not dry off completely before applying)    Moisturizer: Use one of the following:  Aquaphor ointment   Cetaphil Cream  Vaseline petroleum jelly  Vanicream  Aveeno Eczema Therapy Balm   Eucerin Cream    FLARING -Follow these instructions when the skin is pink or red and itchy.    · For active areas on the EAR AND BEHIND THE EARs that are moderately to  severely involved: Continue applying Triamcinolone 0.1% cream  2 times per day for 2 weeks and take a week break , once after bath and one other time during the day. DO NOT apply to FACE, GENITAL REGION, or “normal” skin (skin that is not red/itchy)  Then apply moisturizer    TIP For the itching/general skin care:  Keep moisturizer in fridge. Cool is soothing!  Initiate short, lukewarm showers with gentle hydrating soaps (eucerin, cerave, vanicream)  Avoid scratching    Note: Discussed side effects of topical steroid overuse, including, but not limited to, striae, skin atrophy, telangiectasia.    IF YOU HAVE RUN OUT OF YOUR PRESCRIPTION, PLEASE CALL THE PHARMACY TO CHECK IF THERE IS A REFILL. WE OFTEN SEND MULTIPLE REFILLS.     SEBORRHEIC KERATOSES  - Relevant exam: Scattered over the trunk/extremities are waxy brown to black plaques and papules with stuck on appearance and consistent dermoscopy  - Exam and clinical history consistent with seborrheic keratoses  - Counseled that these are benign growths that do not require treatment    MELANOCYTIC NEVI  -Relevant exam: Scattered over the trunk/extremities are homogenously pigmented brown macules and papules. ELM performed and without concerning findings. No outliers unless otherwise noted in today's note  - Exam and clinical history consistent with melanocytic nevi  - Counseled to return to clinic prior to scheduled appointment should any of these lesions change or should any new lesions of concern arise  - Counseled on use of sun protection daily. Reviewed latest FDA sunscreen guidelines, including use of broad spectrum (UVA and UVB blocking) sunscreen or sun protective clothing with SPF 30-50 every 2-3 hours and reapplied after exposure to water    LENTIGINES  OTHER SKIN CHANGES DUE TO CHRONIC EXPOSURE TO NONIONIZING RADIATION  - Relevant exam: Over sun exposed areas are brown macules. ELM performed and without concerning findings.  - Exam and clinical history  consistent with lentigines.  - Counseled to return to clinic prior to scheduled appointment should any of these lesions change or should any new lesions of concern arise.  - Recommended use of sunscreen as above and below.    CHERRY ANGIOMAS  - Relevant exam: Scattered over the trunk/extremities are red papules  - Exam and clinical history consistent with cherry angiomas  - Educated that these are benign      Scribe Attestation      I,:  Leeann Hermosillo MA am acting as a scribe while in the presence of the attending physician.:       I,:  ELENA Weiner personally performed the services described in this documentation    as scribed in my presence.:

## 2025-05-06 ENCOUNTER — RESULTS FOLLOW-UP (OUTPATIENT)
Age: 45
End: 2025-05-06

## 2025-05-06 PROCEDURE — 88342 IMHCHEM/IMCYTCHM 1ST ANTB: CPT | Performed by: PATHOLOGY

## 2025-05-06 PROCEDURE — 88305 TISSUE EXAM BY PATHOLOGIST: CPT | Performed by: PATHOLOGY

## 2025-05-06 PROCEDURE — 88341 IMHCHEM/IMCYTCHM EA ADD ANTB: CPT | Performed by: PATHOLOGY

## 2025-05-06 NOTE — TELEPHONE ENCOUNTER
Patient called stating she received a message about her pathology results and would like a call back to discuss

## 2025-05-09 ENCOUNTER — OFFICE VISIT (OUTPATIENT)
Dept: FAMILY MEDICINE CLINIC | Facility: CLINIC | Age: 45
End: 2025-05-09
Payer: COMMERCIAL

## 2025-05-09 ENCOUNTER — ANNUAL EXAM (OUTPATIENT)
Dept: GYNECOLOGY | Facility: CLINIC | Age: 45
End: 2025-05-09
Payer: COMMERCIAL

## 2025-05-09 VITALS
WEIGHT: 183 LBS | TEMPERATURE: 98.1 F | DIASTOLIC BLOOD PRESSURE: 62 MMHG | HEIGHT: 64 IN | RESPIRATION RATE: 16 BRPM | BODY MASS INDEX: 31.24 KG/M2 | HEART RATE: 71 BPM | OXYGEN SATURATION: 99 % | SYSTOLIC BLOOD PRESSURE: 112 MMHG

## 2025-05-09 VITALS
SYSTOLIC BLOOD PRESSURE: 110 MMHG | WEIGHT: 183.6 LBS | BODY MASS INDEX: 31.34 KG/M2 | HEIGHT: 64 IN | DIASTOLIC BLOOD PRESSURE: 68 MMHG

## 2025-05-09 DIAGNOSIS — Z97.5 IUD CONTRACEPTION: ICD-10-CM

## 2025-05-09 DIAGNOSIS — Z00.00 ANNUAL PHYSICAL EXAM: Primary | ICD-10-CM

## 2025-05-09 DIAGNOSIS — E66.811 CLASS 1 OBESITY WITHOUT SERIOUS COMORBIDITY WITH BODY MASS INDEX (BMI) OF 31.0 TO 31.9 IN ADULT, UNSPECIFIED OBESITY TYPE: ICD-10-CM

## 2025-05-09 DIAGNOSIS — Z01.419 WOMEN'S ANNUAL ROUTINE GYNECOLOGICAL EXAMINATION: Primary | ICD-10-CM

## 2025-05-09 DIAGNOSIS — N95.1 PERIMENOPAUSAL: ICD-10-CM

## 2025-05-09 DIAGNOSIS — Z12.31 ENCOUNTER FOR SCREENING MAMMOGRAM FOR BREAST CANCER: ICD-10-CM

## 2025-05-09 DIAGNOSIS — Z11.51 SCREENING FOR HUMAN PAPILLOMAVIRUS (HPV): ICD-10-CM

## 2025-05-09 DIAGNOSIS — N91.4 SECONDARY OLIGOMENORRHEA: ICD-10-CM

## 2025-05-09 PROCEDURE — G0145 SCR C/V CYTO,THINLAYER,RESCR: HCPCS | Performed by: OBSTETRICS & GYNECOLOGY

## 2025-05-09 PROCEDURE — G0476 HPV COMBO ASSAY CA SCREEN: HCPCS | Performed by: OBSTETRICS & GYNECOLOGY

## 2025-05-09 PROCEDURE — 99396 PREV VISIT EST AGE 40-64: CPT | Performed by: FAMILY MEDICINE

## 2025-05-09 PROCEDURE — S0612 ANNUAL GYNECOLOGICAL EXAMINA: HCPCS | Performed by: OBSTETRICS & GYNECOLOGY

## 2025-05-09 NOTE — PROGRESS NOTES
Assessment & Plan   Diagnoses and all orders for this visit:    Women's annual routine gynecological examination    Encounter for screening mammogram for breast cancer  -     Mammo screening bilateral w 3d and cad; Future    IUD contraception    Perimenopausal    Secondary oligomenorrhea    1. yearly exam-Pap smear done with HPV testing, self breast awareness reviewed, calcium/vitamin D recommendations discussed, mammogram request given, colonoscopy 7/11/2024 with polyp, follow-up as per specialist  2.  Kyleena IUD-inserted 2/24/2023.  It is good for 5 years, up to February 2028.  It is in good position, IUD string seen at a length of 2.5 to 3 cm.  She is status post Suzanne, then Kyleena, now on second Kyleena.  She is very happy and she will continue with this.  3.  Secondary oligomenorrhea-related to Kyleena  4.  History of severe dysplasia-status post LEEP cone biopsy 2004 with negative testing since then.  Pap with HPV was - 5/5/2023.  Pap with HPV done today with disposition as per findings.  5. history of dense breast changes-patient counseled about this previously with limited visualization and possible increased risk.  Continue with 3D mammograms, consider ABUS going forward  6. perimenopausal symptoms-notes a plethora of symptoms including mood changes, increased anxiousness, brain fog, loss of words, weight gain of approximately 5 pounds, increased hip/back pain, sweats, dry eyes.  She denies any dyspareunia or vaginal dryness.  Practical recommendations were reviewed in detail.  [place] menopause Society information on sleep and menopause and nonhormonal treatments were reviewed.  Suggest adequate temperature, use of fans, layering of clothing, avoidance of exacerbating food or drinks such as spices or alcohol or caffeine.  Did review medical options including low-dose OCP, Veozah, and Brisdelle.  She will consider this.  Otherwise, follow-up 1 year for yearly exam or as needed.  Subjective    Patient ID: Karine Dan is a 45 y.o. female.    Vitals:    25 1403   BP: 110/68     Patient was seen today for yearly exam.  Please see assessment plan for details.      The following portions of the patient's history were reviewed and updated as appropriate: allergies, current medications, past family history, past medical history, past social history, past surgical history, and problem list.  Past Medical History:   Diagnosis Date    Abnormal Pap smear of cervix     Acne Teenage years    Addiction to drug (HCC)     no addiction problems    Allergic     Seasonal    Allergic rhinitis     last assessed-2012    Anemia     Anxiety     Periodically throughout [facility]    BRCA1 negative 2024    BRCA2 negative 2024    Chronic pain syndrome 2020    SANDEEP III (cervical intraepithelial neoplasia grade III) with severe dysplasia     LEEP cone biopsy for SANDEEP-3 04.  Done at the hospital    Eczema     GERD (gastroesophageal reflux disease)     Periodically based on foods eaten and how quickly, managed with antacids    Gestational diabetes     antepartum condition or prior complicated delivery    Hallucination     No hallucinations    Impacted cerumen of both ears     last assessed-2012    Low back pain     Oligomenorrhea     Panic attack     No panic attacks    Skin tag     Sleep difficulties     No trouble sleeping    Spinal stenosis 2018    Spinal stenosis of lumbar region without neurogenic claudication 2019    Vaginal candidiasis     last assessed-2013    Varicella     chickenpox as a child    Vertigo     last assessed-2013    Visual impairment     Wear reading glasses     Past Surgical History:   Procedure Laterality Date    CERVICAL BIOPSY  W/ LOOP ELECTRODE EXCISION  2004    Pathololgy with SANDEEP-2 to 3, with free margins but dysplasia extended to within 1mm of endocervical margin.  2004     SECTION, LOW TRANSVERSE  2009    Primary  low transverse  for arrest in the active phase at 4 cm with unengaged vertex despite hours of labor    COLPOSCOPY W/ BIOPSY / CURETTAGE  2003    DENTAL SURGERY      [place] teeth extraction    EPIDURAL BLOCK INJECTION N/A 2020    Procedure: L4-L5 LUMBAR EPIDURAL STEROID INJECTION (28117);  Surgeon: Elke Aguilar MD;  Location: [facility] MAIN OR;  Service: Pain Management     FOOT SURGERY Right 2015    Bunionectomy on right foot    ORTHOPEDIC SURGERY  2015    Bunionectomy    SKIN BIOPSY      On shoulder    TOOTH EXTRACTION       OB History    Para Term  AB Living   1 1 1   1   SAB IAB Ectopic Multiple Live Births       1      # Outcome Date GA Lbr Gregory/2nd Weight Sex Type Anes PTL Lv   1 Term 09    M    JIMMY      Obstetric Comments    1       Current Outpatient Medications:     carbamide peroxide (Debrox) 6.5 % otic solution, , Disp: , Rfl:     fluticasone (FLONASE) 50 mcg/act nasal spray, 1 spray into each nostril daily, Disp: , Rfl:     Levonorgestrel (KYLEENA IU), by Intrauterine route, Disp: , Rfl:     loratadine (Alavert) 10 MG dissolvable tablet, , Disp: , Rfl:     triamcinolone (KENALOG) 0.1 % cream, Apply topically 2 (two) times a day, Disp: 28.4 g, Rfl: 0    triamcinolone (KENALOG) 0.147 MG/GM topical spray, Apply topically 2 (two) times a day, Disp: 100 g, Rfl: 1  No Known Allergies  Social History     Socioeconomic History    Marital status: /Civil Union     Spouse name: None    Number of children: 1    Years of education: None    Highest education level: None   Occupational History    None   Tobacco Use    Smoking status: Never    Smokeless tobacco: Never   Vaping Use    Vaping status: Never Used   Substance and Sexual Activity    Alcohol use: Yes     Alcohol/week: 2.0 standard drinks of alcohol     Types: 2 Glasses of wine per week    Drug use: No    Sexual activity: Yes     Partners: Male     Birth control/protection: I.U.D.    Other Topics Concern    None   Social History Narrative    Caffeine use    Scientologist Affiliation [employer]    Uses safety equipment-seatbelts     Social Drivers of Health     Financial Resource Strain: Not on file   Food Insecurity: No Food Insecurity (5/4/2025)    Hunger Vital Sign     Worried About Running Out of Food in the Last Year: Never true     Ran Out of Food in the Last Year: Never true   Transportation Needs: No Transportation Needs (5/4/2025)    PRAPARE - Transportation     Lack of Transportation (Medical): No     Lack of Transportation (Non-Medical): No   Physical Activity: Not on file   Stress: Not on file   Social Connections: Not on file   Intimate Partner Violence: Not on file   Housing Stability: Low Risk  (5/4/2025)    Housing Stability Vital Sign     Unable to Pay for Housing in the Last Year: No     Number of Times Moved in the Last Year: 0     Homeless in the Last Year: No     Family History   Problem Relation Age of Onset    Skin cancer Mother     Hypertension Mother     Arthritis Mother     Anxiety disorder Mother     Hypertension Father     Kidney failure Father     Depression Father         Not official diagnosed    Heart disease Father         CHF    Drug abuse Father         Marijuana    Hypertension Maternal Grandmother     Lung cancer Maternal Grandmother     Arthritis Maternal Grandmother     Cancer Maternal Grandmother         Lung    Anxiety disorder Maternal Grandmother         Not official diagnosed    Osteoporosis Maternal Grandmother     Cancer Maternal Grandfather         Bladder    Hypertension Maternal Grandfather     Alcohol abuse Maternal Grandfather     Hearing loss Maternal Grandfather     Psoriasis Maternal Grandfather     Lung cancer Paternal Grandmother     Cancer Paternal Grandmother         Lung    Heart disease Paternal Grandfather     Alcohol abuse Brother     Depression Brother         Not official diagnosed    Hypertension Brother     Anxiety disorder  "Brother     Drug abuse Brother         Various substances    No Known Problems Son     Alcohol abuse Maternal Aunt     Drug abuse Maternal Aunt         Various substances    Anxiety disorder Maternal Aunt         Not official diagnosed    Hypertension Maternal Aunt     No Known Problems Paternal Aunt     No Known Problems Paternal Uncle     No Known Problems Paternal Uncle     No Known Problems Paternal Uncle     No Known Problems Half-Brother     Hypertension Maternal Aunt        Review of Systems   Constitutional:  Negative for chills, diaphoresis, fatigue and fever.   Respiratory:  Negative for apnea, cough, chest tightness, shortness of breath and wheezing.    Cardiovascular:  Negative for chest pain, palpitations and leg swelling.   Gastrointestinal:  Negative for abdominal distention, abdominal pain, anal bleeding, constipation, diarrhea, nausea, rectal pain and vomiting.   Genitourinary:  Negative for difficulty urinating, dyspareunia, dysuria, frequency, hematuria, menstrual problem, pelvic pain, urgency, vaginal bleeding, vaginal discharge and vaginal pain.   Musculoskeletal:  Negative for arthralgias, back pain and myalgias.   Skin:  Negative for color change and rash.   Neurological:  Negative for dizziness, syncope, light-headedness, numbness and headaches.   Hematological:  Negative for adenopathy. Does not bruise/bleed easily.   Psychiatric/Behavioral:  Negative for dysphoric mood and sleep disturbance. The patient is not nervous/anxious.        Objective   Physical Exam  OBGyn Exam     Objective      /68 (BP Location: Left arm, Patient Position: Sitting)   Ht 5' 4\" (1.626 m)   Wt 83.3 kg (183 lb 9.6 oz)   BMI 31.51 kg/m²     General:   alert and oriented, in no acute distress   Neck: normal to inspection and palpation   Breast: normal appearance, no masses or tenderness   Heart:    Lungs:    Abdomen: soft, non-tender, without masses or organomegaly   Vulva: normal   Vagina: Without erythema " or lesions or discharge.  Normal   Cervix: Without lesions or discharge or cervicitis.  No Cervical motion tenderness.  IUD string was seen at a length of 2.5 to 3 cm   Uterus: top normal size, anteverted, non-tender   Adnexa: no mass, fullness, tenderness   Rectum: negative    Psych:  Normal mood and affect   Skin:  Without obvious lesions   Eyes: symmetric, with normal movements and reactivity   Musculoskeletal:  Normal muscle tone and movements appreciated

## 2025-05-09 NOTE — PROGRESS NOTES
Adult Annual Physical  Name: Karine Dan      : 1980      MRN: [id number]  Encounter Provider: Mahendra Jeffery MD  Encounter Date: 2025   Encounter department: [facility]    :  Assessment & Plan  Annual physical exam    Orders:    Hemoglobin A1C; Future    Lipid panel; Future    Class 1 obesity without serious comorbidity with body mass index (BMI) of 31.0 to 31.9 in adult, unspecified obesity type      Orders:    TSH, 3rd generation with Free T4 reflex; Future    CBC and Platelet; Future    Basic metabolic panel; Future        Preventive Screenings:    - Cervical cancer screening: has history of cervical cancer   - Breast cancer screening: screening up-to-date          History of Present Illness     Adult Annual Physical:  Patient presents for annual physical.     Diet and Physical Activity:  - Diet/Nutrition: no special diet, well balanced diet, limited junk food, low fat diet, consuming 3-5 servings of fruits/vegetables daily, adequate fiber intake and adequate whole grain intake.  - Exercise: walking, vigorous cardiovascular exercise, strength training exercises, 3-4 times a week on average and 30-60 minutes on average.    Depression Screening:  - PHQ-2 Score: 1    General Health:  - Sleep: sleeps well, 4-6 hours of sleep on average and 7-8 hours of sleep on average.  - Hearing: normal hearing bilateral ears.  - Vision: no vision problems, most recent eye exam < 1 year ago and wears glasses.  - Dental: regular dental visits, brushes teeth twice daily and floss regularly.    /GYN Health:  - Follows with GYN: yes.   - Menopause: perimenopausal.   - Contraception: IUD placement.      Advanced Care Planning:  - Has an advanced directive?: no    - Has a durable medical POA?: no      Review of Systems   Constitutional:  Negative for activity change, fatigue and fever.   Eyes:  Negative for visual disturbance.   Respiratory:  Negative for shortness of breath.    Cardiovascular:  " Negative for chest pain.   Gastrointestinal:  Negative for abdominal pain, constipation, diarrhea and nausea.   Endocrine: Negative for cold intolerance and heat intolerance.   Musculoskeletal:  Negative for back pain.   Skin:  Negative for rash.   Neurological:  Negative for headaches.   Psychiatric/Behavioral:  Negative for confusion.          Objective   /62 (BP Location: Left arm, Patient Position: Sitting, Cuff Size: Standard)   Pulse 71   Temp 98.1 °F (36.7 °C) (Temporal)   Resp 16   Ht 5' 4\" (1.626 m)   Wt 83 kg (183 lb)   SpO2 99%   BMI 31.41 kg/m²     Physical Exam  Vitals and nursing note reviewed.   Constitutional:       Appearance: Normal appearance. She is well-developed.   HENT:      Head: Normocephalic and atraumatic.   Cardiovascular:      Rate and Rhythm: Normal rate and regular rhythm.   Pulmonary:      Effort: Pulmonary effort is normal.      Breath sounds: Normal breath sounds.   Abdominal:      General: Bowel sounds are normal.      Palpations: Abdomen is soft.   Musculoskeletal:      Cervical back: Normal range of motion.   Skin:     General: Skin is warm.   Neurological:      General: No focal deficit present.      Mental Status: She is alert.   Psychiatric:         Mood and Affect: Mood normal.         Speech: Speech normal.         "

## 2025-05-09 NOTE — ASSESSMENT & PLAN NOTE
Orders:    TSH, 3rd generation with Free T4 reflex; Future    CBC and Platelet; Future    Basic metabolic panel; Future

## 2025-05-12 ENCOUNTER — OFFICE VISIT (OUTPATIENT)
Dept: BARIATRICS | Facility: CLINIC | Age: 45
End: 2025-05-12
Payer: COMMERCIAL

## 2025-05-12 VITALS
WEIGHT: 185.8 LBS | BODY MASS INDEX: 31.72 KG/M2 | SYSTOLIC BLOOD PRESSURE: 110 MMHG | HEIGHT: 64 IN | OXYGEN SATURATION: 99 % | HEART RATE: 65 BPM | DIASTOLIC BLOOD PRESSURE: 68 MMHG

## 2025-05-12 DIAGNOSIS — E66.811 CLASS 1 OBESITY DUE TO EXCESS CALORIES WITHOUT SERIOUS COMORBIDITY WITH BODY MASS INDEX (BMI) OF 31.0 TO 31.9 IN ADULT: Primary | ICD-10-CM

## 2025-05-12 DIAGNOSIS — E66.09 CLASS 1 OBESITY DUE TO EXCESS CALORIES WITHOUT SERIOUS COMORBIDITY WITH BODY MASS INDEX (BMI) OF 31.0 TO 31.9 IN ADULT: Primary | ICD-10-CM

## 2025-05-12 PROCEDURE — 99204 OFFICE O/P NEW MOD 45 MIN: CPT | Performed by: NURSE PRACTITIONER

## 2025-05-12 NOTE — PROGRESS NOTES
Assessment/Plan:    Class 1 obesity due to excess calories without serious comorbidity with body mass index (BMI) of 31.0 to 31.9 in adult  - Patient is pursuing Conservative Program and follow up visits with medical weight management provider  - Initial weight loss goal of 5-10% weight loss for improved health. Weight loss can improve patient's co-morbid conditions and/or prevent weight-related complications.  - Explained the importance of continuing lifestyle changes in addition to any anti-obesity medications.   - Labs reviewed from 6/2024    General Recommendations:  Nutrition:  Eat breakfast daily.  Do not skip meals.      Food log (ie.) www.[employer].com, sparkpeople.com, loseit.com, YouMail.com, etc.     Practice mindful eating.  Be sure to set aside time to eat, eat slowly, and savor your food.     Hydration:    At least 64oz of water daily.  No sugar sweetened beverages.  No juice (eat the fruit instead).     Exercise:  Studies have shown that the ideal exercise goal is somewhere between 150 to 300 minutes of moderate intensity exercise a week.  Start with exercising 10 minutes every other day and gradually increase physical activity with a goal of at least 150 minutes of moderate intensity exercise a week, divided over at least 3 days a week.  An example of this would be exercising 30 minutes a day, 5 days a week.  Resistance training can increase muscle mass and increase our resting metabolic rate.   FULL BODY resistance training is recommended 2-3 times a week.  Do not do this on consecutive days to allow for muscle recovery.     Aim for a bare minimum 5000 steps, even on days you do not exercise.     Monitoring:   Weigh yourself daily.  If this causes undue stress, then just weigh yourself once a week.  Weigh yourself the same time of the day with the same amount of clothing on.  Preferably this should be done after waking up, before you eat, and with no clothing or minimal clothing on.      Specific Goals:  Patient lifestyle habits were reviewed and barriers to weight loss were addressed today.  Nutrition was discussed and patient will try to balance her calories more evenly throughout the day incorporating 3 meals and 3 smaller snacks between each meal.  She will try to keep her calorie goal between 1200 to 1400 consuelo/day which will allow for better weight loss.  On the days that she is more active she may allow for an extra 100 to 150 consuelo if she feels hungry.  She will also try to make sure she has protein with each meal.   Activity was discussed and she will continue with 3 to 4 days of activity weekly including build strength training and cardiovascular exercise.  Medications were discussed but at this time we will hold on any weight loss medications.  May consider the addition of Contrave in the future to help with food related cravings and behaviors.  Also discussed GLP-1 therapy, specifically Wegovy as this may also reduce the risk of heart disease given her family history.  Patient will continue to work on her weight loss and return to medical weight management as needed for further evaluation and possibly medication tools if necessary.         Karnie was seen today for follow-up.    Diagnoses and all orders for this visit:    Class 1 obesity due to excess calories without serious comorbidity with body mass index (BMI) of 31.0 to 31.9 in adult           Total time spent reviewing chart, interviewing patient, examining patient, discussing plan, answering all questions, and documentin min, with >50% face-to-face time spent counseling patient on nonsurgical interventions for the treatment of excess weight. Discussed in detail nonsurgical options including intensive lifestyle intervention program, very low-calorie diet program and conservative program.  Discussed the role of weight loss medications.  Counseled patient on diet behavior and exercise modification for weight loss.    Follow up in  approximately 3 months with Non-Surgical Physician/Advanced Practitioner.    Subjective:   Chief Complaint   Patient presents with    Follow-up       Patient ID: Karine Dan  is a 45 y.o. female with excess weight/obesity here to pursue weight management.  Previous notes and records have been reviewed.    Past Medical History:   Diagnosis Date    Abnormal Pap smear of cervix     Acne Teenage years    Addiction to drug (HCC)     no addiction problems    Allergic     Seasonal    Allergic rhinitis     last assessed-2012    Anemia     Anxiety     Periodically throughout lofe    BRCA1 negative 2024    BRCA2 negative 2024    Chronic pain syndrome 2020    SANDEEP III (cervical intraepithelial neoplasia grade III) with severe dysplasia     LEEP cone biopsy for SANDEEP-3 04.  Done at the hospital    Eczema     GERD (gastroesophageal reflux disease)     Periodically based on foods eaten and how quickly, managed with antacids    Gestational diabetes     antepartum condition or prior complicated delivery    Hallucination     No hallucinations    Impacted cerumen of both ears     last assessed-2012    Low back pain     Oligomenorrhea     Panic attack     No panic attacks    Skin tag     Sleep difficulties     No trouble sleeping    Spinal stenosis 2018    Spinal stenosis of lumbar region without neurogenic claudication 2019    Vaginal candidiasis     last assessed-2013    Varicella     chickenpox as a child    Vertigo     last assessed-2013    Visual impairment     Wear reading glasses     Past Surgical History:   Procedure Laterality Date    CERVICAL BIOPSY  W/ LOOP ELECTRODE EXCISION  2004    Pathololgy with SANDEEP-2 to 3, with free margins but dysplasia extended to within 1mm of endocervical margin.  2004     SECTION, LOW TRANSVERSE  2009    Primary low transverse  for arrest in the active phase at 4 cm with unengaged vertex despite  hours of labor    COLPOSCOPY W/ BIOPSY / CURETTAGE  12/19/2003    DENTAL SURGERY  1999    [place] teeth extraction    EPIDURAL BLOCK INJECTION N/A 07/09/2020    Procedure: L4-L5 LUMBAR EPIDURAL STEROID INJECTION (12791);  Surgeon: Elke Aguilar MD;  Location: [facility] MAIN OR;  Service: Pain Management     FOOT SURGERY Right November 2015    Bunionectomy on right foot    ORTHOPEDIC SURGERY  November 2015    Bunionectomy    SKIN BIOPSY  2005    On shoulder    TOOTH EXTRACTION         HPI:  Wt Readings from Last 20 Encounters:   05/12/25 84.3 kg (185 lb 12.8 oz)   05/09/25 83.3 kg (183 lb 9.6 oz)   05/09/25 83 kg (183 lb)   04/29/25 82.6 kg (182 lb)   10/07/24 82.6 kg (182 lb)   08/16/24 80.7 kg (178 lb)   07/11/24 79.4 kg (175 lb)   05/10/24 80.9 kg (178 lb 6.4 oz)   05/10/24 82.1 kg (181 lb)   03/20/24 82.3 kg (181 lb 8 oz)   03/12/24 82.1 kg (181 lb)   12/19/23 82.1 kg (181 lb)   08/10/23 81.6 kg (179 lb 14.3 oz)   05/05/23 81.7 kg (180 lb 3.2 oz)   05/05/23 81.6 kg (180 lb)   03/24/23 81.1 kg (178 lb 12.8 oz)   02/24/23 82.6 kg (182 lb 3.2 oz)   07/28/22 81.6 kg (180 lb)   06/02/22 81.6 kg (180 lb)   04/29/22 81 kg (178 lb 8 oz)       Patient presents today to medical weight management office for consult.  Patient has been struggling with her weight on and off for a few years.  She was able to successfully lose 50 pounds on weight watchers and another 25 pounds when she added exercise.  She has been able to sustain this weight loss and a weight around 165 pounds until the past couple of years.  She has now started to see every year her weight is increasing 5 to 10 pounds.  She is staying on track with her nutrition, logging her food, and exercising 3 to 4 days a week.  She does find that it is more difficult for her to stay on track towards the end of the day when her cravings for sweets are more significant and on the weekends when her routine is less structured.  Patient would like to avoid weight loss medications  at this time but would like to further address if nutrition and lifestyle can be adjusted to help her maintain a more comfortable weight of 165-170 lbs.  She had the body composition test and the metabolism test completed and her BMR was estimated around 1500 consuelo/day.  She states she is tracking most of the time around 1500 to 1700 consuelo/day and on the weekends she will go up to as high as 2600 consuelo/day.  Patient does have a strong family history of heart disease and has a personal history of gestational diabetes.    Starting MWM weight: 185.8 lbs  Starting BMI: 31.89  Starting Waist Measurement: 38 in  Goal weight: 165-170 lbs      Diet recall:  B: eggs and kodiak cake  L: leftovers OR salad  S: yogurt  D: protein, vegetable, starch  S: yasso bar OR chocolate  Take out frequency: weekends    Hydration: water, coffee  Alcohol: socially  Smoking: no  Exercise: yes - 3-4 times per week  Sleep: well        The following portions of the patient's history were reviewed and updated as appropriate: allergies, current medications, past family history, past medical history, past social history, past surgical history, and problem list.    Family History   Problem Relation Age of Onset    Skin cancer Mother     Hypertension Mother     Arthritis Mother     Anxiety disorder Mother     Hypertension Father     Kidney failure Father     Depression Father         Not official diagnosed    Heart disease Father         CHF    Drug abuse Father         Marijuana    Hypertension Maternal Grandmother     Lung cancer Maternal Grandmother     Arthritis Maternal Grandmother     Cancer Maternal Grandmother         Lung    Anxiety disorder Maternal Grandmother         Not official diagnosed    Osteoporosis Maternal Grandmother     Cancer Maternal Grandfather         Bladder    Hypertension Maternal Grandfather     Alcohol abuse Maternal Grandfather     Hearing loss Maternal Grandfather     Psoriasis Maternal Grandfather     Lung cancer Paternal  "Grandmother     Cancer Paternal Grandmother         Lung    Heart disease Paternal Grandfather     Alcohol abuse Brother     Depression Brother         Not official diagnosed    Hypertension Brother     Anxiety disorder Brother     Drug abuse Brother         Various substances    No Known Problems Son     Alcohol abuse Maternal Aunt     Drug abuse Maternal Aunt         Various substances    Anxiety disorder Maternal Aunt         Not official diagnosed    Hypertension Maternal Aunt     No Known Problems Paternal Aunt     No Known Problems Paternal Uncle     No Known Problems Paternal Uncle     No Known Problems Paternal Uncle     No Known Problems Half-Brother     Hypertension Maternal Aunt         Review of Systems   Constitutional:  Negative for fatigue.   HENT:  Negative for sore throat.    Respiratory:  Negative for cough and shortness of breath.    Cardiovascular:  Negative for chest pain, palpitations and leg swelling.   Gastrointestinal:  Negative for abdominal pain, constipation, diarrhea and nausea.   Genitourinary:  Negative for dysuria.   Musculoskeletal:  Negative for arthralgias and back pain.   Skin:  Negative for rash.   Neurological:  Negative for headaches.   Psychiatric/Behavioral:  Negative for dysphoric mood. The patient is not nervous/anxious.        Objective:  /68   Pulse 65   Ht 5' 4\" (1.626 m)   Wt 84.3 kg (185 lb 12.8 oz)   LMP  (LMP Unknown)   SpO2 99%   BMI 31.89 kg/m²     Physical Exam  Vitals and nursing note reviewed.   Constitutional:       Appearance: Normal appearance. She is obese.   HENT:      Head: Normocephalic.   Pulmonary:      Effort: Pulmonary effort is normal.   Neurological:      General: No focal deficit present.      Mental Status: She is alert and oriented to person, place, and time.   Psychiatric:         Mood and Affect: Mood normal.         Behavior: Behavior normal.         Thought Content: Thought content normal.         Judgment: Judgment normal.      "         Labs and Imaging  Recent labs and imaging have been personally reviewed.  Lab Results   Component Value Date    WBC 5.46 06/13/2024    HGB 13.4 06/13/2024    HCT 40.9 06/13/2024    MCV 99 (H) 06/13/2024     06/13/2024     Lab Results   Component Value Date    SODIUM 138 06/13/2024    K 4.5 06/13/2024     06/13/2024    CO2 27 06/13/2024    AGAP 6 06/13/2024    BUN 16 06/13/2024    CREATININE 0.69 06/13/2024    GLUF 81 06/13/2024    CALCIUM 9.1 06/13/2024    AST 19 08/28/2020    ALT 19 08/28/2020    ALKPHOS 56 08/28/2020    TP 7.2 08/28/2020    TBILI 0.38 08/28/2020    EGFR 105 06/13/2024     Lab Results   Component Value Date    HGBA1C 5.3 06/13/2024     Lab Results   Component Value Date    SSP4PYDSMIQM 0.947 06/13/2024     Lab Results   Component Value Date    CHOLESTEROL 172 06/13/2024     Lab Results   Component Value Date    HDL 62 06/13/2024     Lab Results   Component Value Date    TRIG 46 06/13/2024     Lab Results   Component Value Date    LDLCALC 101 (H) 06/13/2024

## 2025-05-12 NOTE — ASSESSMENT & PLAN NOTE
- Patient is pursuing Conservative Program and follow up visits with medical weight management provider  - Initial weight loss goal of 5-10% weight loss for improved health. Weight loss can improve patient's co-morbid conditions and/or prevent weight-related complications.  - Explained the importance of continuing lifestyle changes in addition to any anti-obesity medications.   - Labs reviewed from 6/2024    General Recommendations:  Nutrition:  Eat breakfast daily.  Do not skip meals.      Food log (ie.) www.[email or web address], sparkpeople.com, loseit.com, calorieking.com, etc.     Practice mindful eating.  Be sure to set aside time to eat, eat slowly, and savor your food.     Hydration:    At least 64oz of water daily.  No sugar sweetened beverages.  No juice (eat the fruit instead).     Exercise:  Studies have shown that the ideal exercise goal is somewhere between 150 to 300 minutes of moderate intensity exercise a week.  Start with exercising 10 minutes every other day and gradually increase physical activity with a goal of at least 150 minutes of moderate intensity exercise a week, divided over at least 3 days a week.  An example of this would be exercising 30 minutes a day, 5 days a week.  Resistance training can increase muscle mass and increase our resting metabolic rate.   FULL BODY resistance training is recommended 2-3 times a week.  Do not do this on consecutive days to allow for muscle recovery.     Aim for a bare minimum 5000 steps, even on days you do not exercise.     Monitoring:   Weigh yourself daily.  If this causes undue stress, then just weigh yourself once a week.  Weigh yourself the same time of the day with the same amount of clothing on.  Preferably this should be done after waking up, before you eat, and with no clothing or minimal clothing on.     Specific Goals:  Patient lifestyle habits were reviewed and barriers to weight loss were addressed today.  Nutrition was discussed and patient  will try to balance her calories more evenly throughout the day incorporating 3 meals and 3 smaller snacks between each meal.  She will try to keep her calorie goal between 1200 to 1400 consuelo/day which will allow for better weight loss.  On the days that she is more active she may allow for an extra 100 to 150 consuelo if she feels hungry.  She will also try to make sure she has protein with each meal.   Activity was discussed and she will continue with 3 to 4 days of activity weekly including build strength training and cardiovascular exercise.  Medications were discussed but at this time we will hold on any weight loss medications.  May consider the addition of Contrave in the future to help with food related cravings and behaviors.  Also discussed GLP-1 therapy, specifically Wegovy as this may also reduce the risk of heart disease given her family history.  Patient will continue to work on her weight loss and return to medical weight management as needed for further evaluation and possibly medication tools if necessary.

## 2025-05-15 ENCOUNTER — RESULTS FOLLOW-UP (OUTPATIENT)
Dept: GYNECOLOGY | Facility: CLINIC | Age: 45
End: 2025-05-15

## 2025-05-15 LAB
LAB AP GYN PRIMARY INTERPRETATION: NORMAL
Lab: NORMAL

## 2025-05-19 ENCOUNTER — APPOINTMENT (OUTPATIENT)
Dept: LAB | Facility: CLINIC | Age: 45
End: 2025-05-19
Attending: FAMILY MEDICINE
Payer: COMMERCIAL

## 2025-05-19 DIAGNOSIS — Z00.00 ANNUAL PHYSICAL EXAM: ICD-10-CM

## 2025-05-19 DIAGNOSIS — E66.811 CLASS 1 OBESITY WITHOUT SERIOUS COMORBIDITY WITH BODY MASS INDEX (BMI) OF 31.0 TO 31.9 IN ADULT, UNSPECIFIED OBESITY TYPE: ICD-10-CM

## 2025-05-19 LAB
ANION GAP SERPL CALCULATED.3IONS-SCNC: 5 MMOL/L (ref 4–13)
BUN SERPL-MCNC: 14 MG/DL (ref 5–25)
CALCIUM SERPL-MCNC: 9 MG/DL (ref 8.4–10.2)
CHLORIDE SERPL-SCNC: 104 MMOL/L (ref 96–108)
CHOLEST SERPL-MCNC: 168 MG/DL (ref ?–200)
CO2 SERPL-SCNC: 30 MMOL/L (ref 21–32)
CREAT SERPL-MCNC: 0.74 MG/DL (ref 0.6–1.3)
ERYTHROCYTE [DISTWIDTH] IN BLOOD BY AUTOMATED COUNT: 12.4 % (ref 11.6–15.1)
EST. AVERAGE GLUCOSE BLD GHB EST-MCNC: 111 MG/DL
GFR SERPL CREATININE-BSD FRML MDRD: 97 ML/MIN/1.73SQ M
GLUCOSE P FAST SERPL-MCNC: 91 MG/DL (ref 65–99)
HBA1C MFR BLD: 5.5 %
HCT VFR BLD AUTO: 39.1 % (ref 34.8–46.1)
HDLC SERPL-MCNC: 59 MG/DL
HGB BLD-MCNC: 13.1 G/DL (ref 11.5–15.4)
LDLC SERPL CALC-MCNC: 95 MG/DL (ref 0–100)
MCH RBC QN AUTO: 32.1 PG (ref 26.8–34.3)
MCHC RBC AUTO-ENTMCNC: 33.5 G/DL (ref 31.4–37.4)
MCV RBC AUTO: 96 FL (ref 82–98)
NONHDLC SERPL-MCNC: 109 MG/DL
PLATELET # BLD AUTO: 220 THOUSANDS/UL (ref 149–390)
PMV BLD AUTO: 10.8 FL (ref 8.9–12.7)
POTASSIUM SERPL-SCNC: 4.3 MMOL/L (ref 3.5–5.3)
RBC # BLD AUTO: 4.08 MILLION/UL (ref 3.81–5.12)
SODIUM SERPL-SCNC: 139 MMOL/L (ref 135–147)
TRIGL SERPL-MCNC: 68 MG/DL (ref ?–150)
TSH SERPL DL<=0.05 MIU/L-ACNC: 1.35 UIU/ML (ref 0.45–4.5)
WBC # BLD AUTO: 5.65 THOUSAND/UL (ref 4.31–10.16)

## 2025-05-19 PROCEDURE — 80048 BASIC METABOLIC PNL TOTAL CA: CPT

## 2025-05-19 PROCEDURE — 84443 ASSAY THYROID STIM HORMONE: CPT

## 2025-05-19 PROCEDURE — 85027 COMPLETE CBC AUTOMATED: CPT

## 2025-05-19 PROCEDURE — 80061 LIPID PANEL: CPT

## 2025-05-19 PROCEDURE — 83036 HEMOGLOBIN GLYCOSYLATED A1C: CPT

## 2025-05-19 PROCEDURE — 36415 COLL VENOUS BLD VENIPUNCTURE: CPT

## 2025-06-09 ENCOUNTER — TELEMEDICINE (OUTPATIENT)
Dept: PSYCHIATRY | Facility: CLINIC | Age: 45
End: 2025-06-09
Payer: COMMERCIAL

## 2025-06-09 DIAGNOSIS — F43.23 ADJUSTMENT DISORDER WITH MIXED ANXIETY AND DEPRESSED MOOD: Primary | ICD-10-CM

## 2025-06-09 PROCEDURE — 90791 PSYCH DIAGNOSTIC EVALUATION: CPT

## 2025-06-09 NOTE — BH TREATMENT PLAN
Outpatient Behavioral Health Psychotherapy Treatment Plan    Karine Dan  1980     Date of Initial Psychotherapy Assessment: 6/9/2025   Date of Current Treatment Plan: 06/09/25  Treatment Plan Target Date: 6/9/2026  Treatment Plan Expiration Date: 12/9/2025    Diagnosis:   1. Adjustment disorder with mixed anxiety and depressed mood            Area(s) of Need: anxiety over parenting an adolescent, strengthening my coping skills and practice them.  Navigating complex feelings about my parenting style versus what my mom's parenting style was, remembering that I'm not alone in this and can ask for help from spouse and friends.      Long Term Goal 1 (in the client's own words): I want to manage my anxiety in the context of my parenting skills and within the context of my self worth - feeling like I'm not loved and cared for and left out and having to be the firm parent and risking that he'll push me away.    Stage of Change:  Contemplation    Target Date for completion: 6/9/2026     Anticipated therapeutic modalities: client centered, DBT, CBT     People identified to complete this goal: Karine therapist.      Objective 1: (identify the means of measuring success in meeting the objective): Therapist will engage Karine in client centered, CBT and DBT.  She will verbally report improved anxiety management in at least 8 out of 10 situations.         I am currently under the care of a [facility] psychiatric provider: no    My [facility] psychiatric provider is: n/a    I am currently taking psychiatric medications: No    I feel that I will be ready for discharge from mental health care when I reach the following (measurable goal/objective): when I can manage my anxiety successfully at least 8 out of 10 situations    For children and adults who have a legal guardian:   Has there been any change to custody orders and/or guardianship status? N/A. If yes, attach updated documentation.    I have created my Crisis Plan  and have been offered a copy of this plan    Behavioral Health Treatment Plan  [facility]: Diagnosis and Treatment Plan explained to Karine Dan acknowledges an understanding of their diagnosis. Karine Dan agrees to this treatment plan.    I have been offered a copy of this Treatment Plan. yes
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
## 2025-06-11 ENCOUNTER — PROCEDURE VISIT (OUTPATIENT)
Dept: DERMATOLOGY | Facility: CLINIC | Age: 45
End: 2025-06-11
Payer: COMMERCIAL

## 2025-06-11 VITALS — TEMPERATURE: 98.4 F | WEIGHT: 180.8 LBS | BODY MASS INDEX: 31.03 KG/M2

## 2025-06-11 DIAGNOSIS — D48.5 ATYPICAL SPITZ NEVUS: Primary | ICD-10-CM

## 2025-06-11 PROCEDURE — 11421 EXC H-F-NK-SP B9+MARG 0.6-1: CPT | Performed by: DERMATOLOGY

## 2025-06-11 PROCEDURE — 12042 INTMD RPR N-HF/GENIT2.6-7.5: CPT | Performed by: DERMATOLOGY

## 2025-06-11 PROCEDURE — 88305 TISSUE EXAM BY PATHOLOGIST: CPT | Performed by: STUDENT IN AN ORGANIZED HEALTH CARE EDUCATION/TRAINING PROGRAM

## 2025-06-11 NOTE — PATIENT INSTRUCTIONS
"   YOUR \"AFTER SURGERY\" REVIEW & INSTRUCTIONS    What to Know About Your Procedure  An \"excision\" was performed today to allow the dermatologist to remove a skin lesion. The procedure involves a local numbing medication and removing the entire lesion (or as much as possible). Typically, the lesion is being removed because it does not look \"normal,\" because it is becoming irritated and traumatized, or for significant appearance reasons.  The skin was cut deeply and then repaired - usually with sutures (stitches).  The removed tissue has been sent to the pathologist who will confirm the diagnosis and verify if the lesion has been completely removed.  Surgical “Vaseline-type” ointment has been applied after the procedure to help create a barrier between your wound and the outside world.     The advantage of using sutures (stitches) to repair a skin excision is that it allows the lesion to heal as quickly as possible with the least amount of scarring and risk of infection, Still, there are some risks and potential complications that you should watch out for that include but are not limited to the following:    Some bleeding is normal at the time of procedure and some bleeding on the gauze bandage after the procedure is normal for the first few days after surgery.  Profuse bleeding or bleeding with swelling and pain is NOT normal and should be reported as detailed below.  Infection is uncommon after skin surgery.  Infection should be reported and is indicated by pain, redness, and discharge of purulent material.  Some pain may occur initially the day after surgery.  Persistent pain or increasing pain days after surgery is not expected and should be reported.  Every effort is made to minimize scar, but location, size, and genetics do play a role in scar appearance.  A surgical wound does not achieve its optimal appearance until 6 months.  There are several treatments available if scarring would be problematic including " "scar creams, silicone pad, laser and scar revision.  Skin discoloration can occur especially in people of color.  Its important to avoid sun on wound in first 6 months after surgery.  Treatment is available if pigment is problematic.  Incomplete removal of the lesion or recurrence of lesion can occur and - depending on the lesion - this would then require further treatment and more surgery.  Nerve damage/numbness and/or loss of function is very rare, but is most likely to occur if the lesion being removed is large or if it is in a \"high risk\" location.  Allergic reaction to lidocaine is rare.  More commonly, epinephrine is used with the lidocaine, and, occasionally, epinephrine (a.k.a., adrenalin) may cause a brief feeling of anxiety or jitteriness.  The person at the microscope (pathologist) may provide additional information that was unexpected. This unexpected finding could prompt the need for additional treatment or evaluation.    At-Home Wound Care  Try NOT to remove the pressure bandage for 48 hours. Keep the area clean and dry while this bandage is on.   After removing the bandage for the first time, gently clean the area with soap and water. If the bandage is difficult to remove, getting the bandage wet in the shower will sometimes help soften the adhesive and allow it to be removed more easily.   You will now need to cleanse this area daily in the shower with gentle soap. There is no need to scrub the area. You will need to apply plain Vaseline ointment (this is over the counter and not a prescription) to the site for up to 2 weeks followed by a clean appropriately sized bandage to area.  Non stick dressing and paper tape (or Hypafix) are recommended for sensitive skin but a bandaid is fine if it covers the area well.  In general, sutures (stitches) are removed in about 5-7 days for face wounds and in about 12-14 days for the body wounds.  Your dermatologist wants you to return for suture removal in 12-14 " "DAYS.     General Restrictions  For TWO (2) DAYS:  You will need to take it very easy as this time is highest risk for bleeding. Being a \"couch potato\" during these two days is generally recommended.   For surgeries on the face/neck/scalp: Avoid leaning down to pick things up off the floor as this brings blood up to your head. Instead, squat down to pick things up.     For TWO WEEKS (14 DAYS):   No heavy lifting (anything greater than 10 pounds)   You can start to do slow, gentle activities such as slow walking but nothing to increase your heart rate and blood pressure too much (such as cardiovascular exercise).  It is important to take it easy as there is still a risk for bleeding and a high risk popping of stitches open during this time.     Site Specific Restrictions  If we did surgery near your eyes (including the nose, forehead, front part of your scalp, cheeks): It is VERY common to get a large amount of swelling around your eyes (puffy eyes). Although less frequent, this can be enough to swell your eyes shut and can also come along with bruising. This should not hurt and is very expected and normal. It is typically worst at ~ 3 days out from your surgery and dramatically better 1 week post-operatively.   If we did surgery around your nose: No blowing your nose as this puts you at higher risk of popping stitches durign this time. Instead dab under your nose with a tissue or use a Q-tip inside your nose.  If we did surgery on the skin above or below your lip or your lip itself: No sipping from straws as this uses a lot of the muscles around your mouth and increases the risk of popping stitches during this time.    Managing Your Pain After Surgery  You can expect to have some pain after surgery. This is normal. The pain is typically worse the first two days after surgery, and quickly begins to get better.   You can use heating pads or ice packs on your incisions to help reduce your pain.   The best strategy for " "controlling your pain after surgery is \"around the clock\" pain control. You can take \"over-the-counter\" (non-prescription) Acetaminophen (Tylenol) for discomfort, unless you have been told not to by your physician.  If you are taking Tylenol at the maximum dose, you can alternate Tylenol with Advil/Motrin (ibuprofen) as long as there are no contraindications.  Alternating these medications with each other (I.e., Tylenol followed by Motrin/Advil) allows you to maximize your pain control.  To alternate these medications properly, you will take a dose of pain medication every three hours, alternating Tylenol (acetaminophen) and Advil/Motrin (ibuprofen).  Start by taking 650 mg of Tylenol (2 pills of 325 mg)  3 hours later take 600 mg of Motrin (3 pills of 200 mg)  3 hours after taking the Motrin take 650 mg of Tylenol  3 hours after that take 600 mg of Motrin.    As an example, if your first dose of Tylenol (acetaminophen) is at 12:00 PM, then you would alternate with Motrin as directed below, continuing usually for no more than a total of 48 hours straight:     12:00 PM  Tylenol 650 mg (2 pills of 325 mg)    3:00 PM  Motrin 600 mg (3 pills of 200 mg)    6:00 PM  Tylenol 650 mg (2 pills of 325 mg)    9:00 PM  Motrin 600 mg (3 pills of 200 mg)      WARNING:  Do NOT take more than 4000 mg of Tylenol or 3200 mg of Motrin in a \"24-hour\" period.       What if you still have pain?    If you have pain that is not controlled with the over-the-counter pain medications (Tylenol and Motrin/Advil), do not hesitate to call our staff using the number provided. We will help make sure you are managing your pain in the best way possible, and if necessary, we can provide a prescription for additional pain medication.     Call our office IMMEDIATELY with any signs of wound infection.  This includes fever, chills, increasing redness, warmth, tenderness, severe discomfort/pain, or pus or foul smell coming from the wound. [facility] " [facility] can be directly at [phone] (SKIN) and ask for the on-call Dermatologist covering surgery/Mohs.    If Bleeding is Noticed  If bleeding is soaking through the bandage, remove the bandage and see where the bleeding is coming from.  Place a clean cloth over the area and apply firm pressure directly to the area that is bleeding for thirty minutes.    Check the wound ONLY after 30 minutes of direct pressure; do not cheat and sneak a peak, as that does not count (i.e., resets the clock back to zero).  If bleeding persists after 30 minutes of legitimate direct pressure, then try one more round of direct pressure to the area.    Should bleeding become heavier or not stop after the second application of direct pressure for 30 minutes, then call [facility] directly at [phone] (SKIN) and ask to speak with the on-call Dermatologist covering surgery/Mohs.  If after hours, go to your nearest Emergency Room or Urgent Care and have the team call [facility] directly at [phone] (SKIN); you will be connected to our after hours team.

## 2025-06-11 NOTE — LETTER
June 11, 2025     Patient: Karine Dan  YOB: 1980  Date of Visit: 6/11/2025      To Whom it May Concern:    Karine Dan is under my professional care. Karine was seen in my office on 6/11/2025. Karine may return to work on 6/12/25.    If you have any questions or concerns, please don't hesitate to call.         Sincerely,          Blake Luna MD

## 2025-06-11 NOTE — PROGRESS NOTES
"[facility] Dermatology Clinic Note     Patient Name: Karine Dan  Encounter Date: 6/11/25       Have you been cared for by a [facility] Dermatologist in the last 3 years and, if so, which description applies to you? Yes. I have been here within the last 3 years, and my medical history has NOT changed since that time. I am of child-bearing potential.     REVIEW OF SYSTEMS:  Have you recently had or currently have any of the following? No changes in my recent health.   PAST MEDICAL HISTORY:  Have you personally ever had or currently have any of the following?  If \"YES,\" then please provide more detail. No changes in my medical history.   HISTORY OF IMMUNOSUPPRESSION: Do you have a history of any of the following:  Systemic Immunosuppression such as Diabetes, Biologic or Immunotherapy, Chemotherapy, Organ Transplantation, Bone Marrow Transplantation or Prednisone?  No     Answering \"YES\" requires the addition of the dotphrase \"IMMUNOSUPPRESSED\" as the first diagnosis of the patient's visit.   FAMILY HISTORY:  Any \"first degree relatives\" (parent, brother, sister, or child) with the following?    No changes in my family's known health.   PATIENT EXPERIENCE:    Do you want the Dermatologist to perform a COMPLETE skin exam today including a clinical examination under the \"bra and underwear\" areas?  NO  If necessary, do we have your permission to call and leave a detailed message on your Preferred Phone number that includes your specific medical information?  Yes      Allergies[1] Current Medications[2]              Whom besides the patient is providing clinical information about today's encounter?   NO ADDITIONAL HISTORIAN (patient alone provided history)    Physical Exam and Assessment/Plan by Diagnosis:      PROCEDURE:  EXCISION NOTE     Procedural Plan:     Attending: /Jeremy  Assistant: Rufina PEARSON  Lesion Anatomic Location (use description from previous biopsy if applicable): right dorsal " "hand  Pre-Op Diagnosis: atypical spitz nevus  Lesion is being treated as \"benign\" or \"MALIGNANT\": benign  Accession Number of any associated previous biopsy/excision: [id number]    Written and verbal (witnessed) informed consent was obtained. We discussed that \"excision\" is a method of removing lesions, both benign and malignant lesions.  A portion of normal skin is often taken to ensure completeness of removal.  I reviewed that this procedure will include numbing the area, cutting around and under the skin lesion, undermining (\"freeing up\") surrounding tissue, and closing the wound with sutures (stitches) both inside and out.  Risks include and are not limited to the following:  Bleeding, pain, infection, scarring, recurrence, more required treatment, no additional information, numbness and/or loss of function (if nerves are damaged).  These risks were considered against the benefits that we discussed, and the patient opted to continue with the procedure. It was discussed with patient that every effort is made to minimize scarring, but scarring is influenced also by extrinsic factor such as location, age and genetics.     Procedural Time Out:      Correct patient? yes  Correct site per Clinic Report? yes  Correct site per previous Path Report? yes  Correct site per Patient's recollection? yes    Anesthesia:      Local anesthesia: 3:1 1% xylocaine with epi and 1-100,000 buffered    Excision Description:      Post-Op Diagnosis: Same as Pre-Op Diagnosis (above)  Pre-op Size: 0.3 cm x 0.5 cm  Margins (enter \"0\" for lipoma/cyst or similar diagnosis): 0.2 cm  TOTAL POSTOPERATIVE DEFECT SIZE (I.e., Pre-op Size + Margins on both sides):  0.7 cm x 0.9 cm    The patient was seated in the procedure/exam room, anesthetized locally, prepped and draped in the usual fashion. Using a #15 blade with a scalpel, the lesion was excised in elliptical fashion.     REQUIRED Junie MELANOMA DATA      This procedure was not performed to treat " primary cutaneous melanoma through wide local excision      Closure Description:      The specific type of closure that was utilized:     INTERMEDIATE Closure  INTERMEDIATE CLOSURE     The patient was brought back into the procedure room, anesthetized locally, prepped and draped in the usual fashion. Using a #15 blade with a scalpel, the lesion was excised in elliptical fashion. The wound was  undermined in the fascial plane.  Purpose of undermining was to decrease wound tension and facilitate closure. Hemostasis was achieved with light electrocoagulation.    The wound was closed with subcutaneous sutures as follows:    Deep Suture Throw, Size, and Type (select all that apply):  Interrupted Deeps; 4-0; vicryl     Epidermal edge closure was accomplished with superficial sutures as follows:    Superficial Suture Throw, Size and Type (select all that apply):  Simple Interrupted; 4-0; Prolene    FINAL LENGTH OF CLOSURE (please enter a length even for lipoma/cyst or similar diagnosis): 2.6 cm       Postoperative Care:      The wound was cleaned with sterile saline, dried off, surgical vaseline ointment was applied, and the wound was covered. A pressure dressing was applied for stabilization and light pressure.    Estimated blood loss:  Less than 3ml.  Complications: none  Post-op medications: none  Additional notes: none    Discharge Plans:      Discharge plans: Plan for return to us for suture removal, as scheduled in 12-14 days.   Patient condition at discharge: STABLE    The patient was given detailed oral and written instructions on postoperative care as detailed in consent. We urged the patient to call us if any problems or question should arise.         Atypical spitz nevus excised with 0.2cm margins for 0.9cm excision and 2.6cm closure.  Well tolerated.  S/R in 2 weeks.    Scribe Attestation      I,:  Rufina Sanford MA am acting as a scribe while in the presence of the attending physician.:       I,:  Blake RÍOS  MD Jeremy personally performed the services described in this documentation    as scribed in my presence.:                  [1] No Known Allergies  [2]   Current Outpatient Medications:     carbamide peroxide (Debrox) 6.5 % otic solution, , Disp: , Rfl:     fluticasone (FLONASE) 50 mcg/act nasal spray, 1 spray into each nostril daily, Disp: , Rfl:     Levonorgestrel (KYLEENA IU), by Intrauterine route, Disp: , Rfl:     loratadine (Alavert) 10 MG dissolvable tablet, , Disp: , Rfl:     triamcinolone (KENALOG) 0.1 % cream, Apply topically 2 (two) times a day, Disp: 28.4 g, Rfl: 0    triamcinolone (KENALOG) 0.147 MG/GM topical spray, Apply topically 2 (two) times a day, Disp: 100 g, Rfl: 1

## 2025-06-11 NOTE — LETTER
After Visit Summary   3/23/2018    January Dickens    MRN: [id number]           Patient Information     Date Of Birth          1981        Visit Information        Provider Department      3/23/2018 10:30 AM Len Matthews MD [facility]        Today's Diagnoses     Routine general medical examination at a health care facility    -  1    Need for prophylactic vaccination with tetanus-diphtheria (TD)        Benign paroxysmal positional vertigo due to bilateral vestibular disorder          Care Instructions      Preventive Health Recommendations  Female Ages 26 - 39  Yearly exam:   See your health care provider every year in order to    Review health changes.     Discuss preventive care.      Review your medicines if you your doctor has prescribed any.    Until age 30: Get a Pap test every three years (more often if you have had an abnormal result).    After age 30: Talk to your doctor about whether you should have a Pap test every 3 years or have a Pap test with HPV screening every 5 years.   You do not need a Pap test if your uterus was removed (hysterectomy) and you have not had cancer.  You should be tested each year for STDs (sexually transmitted diseases), if you're at risk.   Talk to your provider about how often to have your cholesterol checked.  If you are at risk for diabetes, you should have a diabetes test (fasting glucose).  Shots: Get a flu shot each year. Get a tetanus shot every 10 years.   Nutrition:     Eat at least 5 servings of fruits and vegetables each day.    Eat whole-grain bread, whole-wheat pasta and brown rice instead of white grains and rice.    Talk to your provider about Calcium and Vitamin D.     Lifestyle    Exercise at least 150 minutes a week (30 minutes a day, 5 days of the week). This will help you control your weight and prevent disease.    Limit alcohol to one drink per day.    No smoking.     Wear sunscreen to prevent skin cancer.    See your  "     YOUR \"AFTER SURGERY\" REVIEW & INSTRUCTIONS    What to Know About Your Procedure  An \"excision\" was performed today to allow the dermatologist to remove a skin lesion. The procedure involves a local numbing medication and removing the entire lesion (or as much as possible). Typically, the lesion is being removed because it does not look \"normal,\" because it is becoming irritated and traumatized, or for significant appearance reasons.  The skin was cut deeply and then repaired - usually with sutures (stitches).  The removed tissue has been sent to the pathologist who will confirm the diagnosis and verify if the lesion has been completely removed.  Surgical “Vaseline-type” ointment has been applied after the procedure to help create a barrier between your wound and the outside world.     The advantage of using sutures (stitches) to repair a skin excision is that it allows the lesion to heal as quickly as possible with the least amount of scarring and risk of infection, Still, there are some risks and potential complications that you should watch out for that include but are not limited to the following:    Some bleeding is normal at the time of procedure and some bleeding on the gauze bandage after the procedure is normal for the first few days after surgery.  Profuse bleeding or bleeding with swelling and pain is NOT normal and should be reported as detailed below.  Infection is uncommon after skin surgery.  Infection should be reported and is indicated by pain, redness, and discharge of purulent material.  Some pain may occur initially the day after surgery.  Persistent pain or increasing pain days after surgery is not expected and should be reported.  Every effort is made to minimize scar, but location, size, and genetics do play a role in scar appearance.  A surgical wound does not achieve its optimal appearance until 6 months.  There are several treatments available if scarring would be problematic including " "dentist every six months for an exam and cleaning.            Follow-ups after your visit        Follow-up notes from your care team     Return in about 1 week (around 3/30/2018), or if symptoms worsen or fail to improve.      Who to contact     If you have questions or need follow up information about today's clinic visit or your schedule please contact [facility] directly at [phone].  Normal or non-critical lab and imaging results will be communicated to you by [employer], letter or phone within 4 business days after the clinic has received the results. If you do not hear from us within 7 days, please contact the clinic through [employer] or phone. If you have a critical or abnormal lab result, we will notify you by phone as soon as possible.  Submit refill requests through [employer] or call your pharmacy and they will forward the refill request to us. Please allow 3 business days for your refill to be completed.          Additional Information About Your Visit        [employer] Information     [employer] gives you secure access to your electronic health record. If you see a primary care provider, you can also send messages to your care team and make appointments. If you have questions, please call your primary care clinic.  If you do not have a primary care provider, please call [phone] and they will assist you.        Care EveryWhere ID     This is your Care EveryWhere ID. This could be used by other organizations to access your [facility] medical records  [id number]        Your Vitals Were     Pulse Temperature Height Last Period BMI (Body Mass Index)       76 98.3  F (36.8  C) (Oral) 5' 6\" (1.676 m) 03/08/2018 (Exact Date) 25.34 kg/m2        Blood Pressure from Last 3 Encounters:   03/23/18 134/85   08/08/17 128/87   05/22/17 132/84    Weight from Last 3 Encounters:   03/23/18 157 lb (71.2 kg)   08/08/17 163 lb (73.9 kg)   05/22/17 166 lb (75.3 kg)              We Performed the Following     CBC " "scar creams, silicone pad, laser and scar revision.  Skin discoloration can occur especially in people of color.  Its important to avoid sun on wound in first 6 months after surgery.  Treatment is available if pigment is problematic.  Incomplete removal of the lesion or recurrence of lesion can occur and - depending on the lesion - this would then require further treatment and more surgery.  Nerve damage/numbness and/or loss of function is very rare, but is most likely to occur if the lesion being removed is large or if it is in a \"high risk\" location.  Allergic reaction to lidocaine is rare.  More commonly, epinephrine is used with the lidocaine, and, occasionally, epinephrine (a.k.a., adrenalin) may cause a brief feeling of anxiety or jitteriness.  The person at the microscope (pathologist) may provide additional information that was unexpected. This unexpected finding could prompt the need for additional treatment or evaluation.    At-Home Wound Care  Try NOT to remove the pressure bandage for 48 hours. Keep the area clean and dry while this bandage is on.   After removing the bandage for the first time, gently clean the area with soap and water. If the bandage is difficult to remove, getting the bandage wet in the shower will sometimes help soften the adhesive and allow it to be removed more easily.   You will now need to cleanse this area daily in the shower with gentle soap. There is no need to scrub the area. You will need to apply plain Vaseline ointment (this is over the counter and not a prescription) to the site for up to 2 weeks followed by a clean appropriately sized bandage to area.  Non stick dressing and paper tape (or Hypafix) are recommended for sensitive skin but a bandaid is fine if it covers the area well.  In general, sutures (stitches) are removed in about 5-7 days for face wounds and in about 12-14 days for the body wounds.  Your dermatologist wants you to return for suture removal in 12-14 " "DAYS.     General Restrictions  For TWO (2) DAYS:  You will need to take it very easy as this time is highest risk for bleeding. Being a \"couch potato\" during these two days is generally recommended.   For surgeries on the face/neck/scalp: Avoid leaning down to pick things up off the floor as this brings blood up to your head. Instead, squat down to pick things up.     For TWO WEEKS (14 DAYS):   No heavy lifting (anything greater than 10 pounds)   You can start to do slow, gentle activities such as slow walking but nothing to increase your heart rate and blood pressure too much (such as cardiovascular exercise).  It is important to take it easy as there is still a risk for bleeding and a high risk popping of stitches open during this time.     Site Specific Restrictions  If we did surgery near your eyes (including the nose, forehead, front part of your scalp, cheeks): It is VERY common to get a large amount of swelling around your eyes (puffy eyes). Although less frequent, this can be enough to swell your eyes shut and can also come along with bruising. This should not hurt and is very expected and normal. It is typically worst at ~ 3 days out from your surgery and dramatically better 1 week post-operatively.   If we did surgery around your nose: No blowing your nose as this puts you at higher risk of popping stitches durign this time. Instead dab under your nose with a tissue or use a Q-tip inside your nose.  If we did surgery on the skin above or below your lip or your lip itself: No sipping from straws as this uses a lot of the muscles around your mouth and increases the risk of popping stitches during this time.    Managing Your Pain After Surgery  You can expect to have some pain after surgery. This is normal. The pain is typically worse the first two days after surgery, and quickly begins to get better.   You can use heating pads or ice packs on your incisions to help reduce your pain.   The best strategy for " with platelets     Lipid panel reflex to direct LDL Fasting        Primary Care Provider Office Phone # Fax #    Len Matthews [phone] [address]  [facility] [id number]        Equal Access to Services     CANDY MANJARREZ : Hadii aad ku hadronniejani Fausto, waaxda luqadaha, qaybta kaalmada aderey, taylor leiva lucia reddy. So [facility] [phone].    ATENCIÓN: Si habla español, tiene a pennington disposición servicios gratuitos de asistencia lingüística. Llame al [phone].    We comply with applicable federal civil rights laws and [place] laws. We do not discriminate on the basis of race, color, national origin, age, disability, sex, sexual orientation, or gender identity.            Thank you!     Thank you for choosing [facility]  for your care. Our goal is always to provide you with excellent care. Hearing back from our patients is one way we can continue to improve our services. Please take a few minutes to complete the written survey that you may receive in the mail after your visit with us. Thank you!             Your Updated Medication List - Protect others around you: Learn how to safely use, store and throw away your medicines at www.disposemymeds.org.          This list is accurate as of 3/23/18 11:09 AM.  Always use your most recent med list.                   Brand Name Dispense Instructions for use Diagnosis    DIPROLENE 0.05 % lotion   Generic drug:  betamethasone (augmented)     60ml    apply to affected area twice daily    Other psoriasis       glucosamine 500 MG Caps           TRIAMCINOLONE ACETONIDE (TOP) 0.1 % Crea     60    apply to affected area twice daily    Other psoriasis       Vitamin D (Cholecalciferol) 1000 UNITS Caps              "controlling your pain after surgery is \"around the clock\" pain control. You can take \"over-the-counter\" (non-prescription) Acetaminophen (Tylenol) for discomfort, unless you have been told not to by your physician.  If you are taking Tylenol at the maximum dose, you can alternate Tylenol with Advil/Motrin (ibuprofen) as long as there are no contraindications.  Alternating these medications with each other (I.e., Tylenol followed by Motrin/Advil) allows you to maximize your pain control.  To alternate these medications properly, you will take a dose of pain medication every three hours, alternating Tylenol (acetaminophen) and Advil/Motrin (ibuprofen).  Start by taking 650 mg of Tylenol (2 pills of 325 mg)  3 hours later take 600 mg of Motrin (3 pills of 200 mg)  3 hours after taking the Motrin take 650 mg of Tylenol  3 hours after that take 600 mg of Motrin.    As an example, if your first dose of Tylenol (acetaminophen) is at 12:00 PM, then you would alternate with Motrin as directed below, continuing usually for no more than a total of 48 hours straight:     12:00 PM  Tylenol 650 mg (2 pills of 325 mg)    3:00 PM  Motrin 600 mg (3 pills of 200 mg)    6:00 PM  Tylenol 650 mg (2 pills of 325 mg)    9:00 PM  Motrin 600 mg (3 pills of 200 mg)      WARNING:  Do NOT take more than 4000 mg of Tylenol or 3200 mg of Motrin in a \"24-hour\" period.       What if you still have pain?    If you have pain that is not controlled with the over-the-counter pain medications (Tylenol and Motrin/Advil), do not hesitate to call our staff using the number provided. We will help make sure you are managing your pain in the best way possible, and if necessary, we can provide a prescription for additional pain medication.     Call our office IMMEDIATELY with any signs of wound infection.  This includes fever, chills, increasing redness, warmth, tenderness, severe discomfort/pain, or pus or foul smell coming from the wound. [facility] " [facility] can be directly at [phone] (SKIN) and ask for the on-call Dermatologist covering surgery/Mohs.    If Bleeding is Noticed  If bleeding is soaking through the bandage, remove the bandage and see where the bleeding is coming from.  Place a clean cloth over the area and apply firm pressure directly to the area that is bleeding for thirty minutes.    Check the wound ONLY after 30 minutes of direct pressure; do not cheat and sneak a peak, as that does not count (i.e., resets the clock back to zero).  If bleeding persists after 30 minutes of legitimate direct pressure, then try one more round of direct pressure to the area.    Should bleeding become heavier or not stop after the second application of direct pressure for 30 minutes, then call [facility] directly at [phone] (SKIN) and ask to speak with the on-call Dermatologist covering surgery/Mohs.  If after hours, go to your nearest Emergency Room or Urgent Care and have the team call [facility] directly at [phone] (SKIN); you will be connected to our after hours team.

## 2025-06-12 ENCOUNTER — TELEPHONE (OUTPATIENT)
Age: 45
End: 2025-06-12

## 2025-06-12 NOTE — TELEPHONE ENCOUNTER
Rec'd call from patient stating that she is unable to view AVS for in office procedure done yesterday 6/11.     Please finish notes so that patient can view. Thank you.

## 2025-06-16 PROCEDURE — 88305 TISSUE EXAM BY PATHOLOGIST: CPT | Performed by: STUDENT IN AN ORGANIZED HEALTH CARE EDUCATION/TRAINING PROGRAM

## 2025-06-18 ENCOUNTER — RESULTS FOLLOW-UP (OUTPATIENT)
Dept: DERMATOLOGY | Facility: CLINIC | Age: 45
End: 2025-06-18

## 2025-06-18 ENCOUNTER — TELEMEDICINE (OUTPATIENT)
Dept: PSYCHIATRY | Facility: CLINIC | Age: 45
End: 2025-06-18
Payer: COMMERCIAL

## 2025-06-18 DIAGNOSIS — F43.23 ADJUSTMENT DISORDER WITH MIXED ANXIETY AND DEPRESSED MOOD: Primary | ICD-10-CM

## 2025-06-18 PROCEDURE — 90837 PSYTX W PT 60 MINUTES: CPT

## 2025-06-18 NOTE — PSYCH
"Virtual Regular VisitName: Karine Dan      : 1980      MRN: [id number]  Encounter Provider: Cherie Finnegan LCSW  Encounter Date: 2025   Encounter department: [facility]  :  Assessment & Plan  Adjustment disorder with mixed anxiety and depressed mood             Goals addressed in session: Goal 1     DATA: Karine shared she is feeling anxious lately at work to the point of feeling tingly in her arms.  She expressed feeling her role is not clearly defined and this causes her to feel anxious because some of the therapists are providing more intensive services than they should.  Therapist and Karine identified some past memories of her mom parenting her in an intense way, overreacting at times.  Karine shared she sometimes feels like she reacts like her mother and does not want to do this because she did not appreciate that when she was growing up because it felt scary and was not compassionate.  She worries that sometimes she is also not strict enough with her son because she does not want to come across as \"scary\" to him but then worries about being too permissive.  Therapist encouraged her to think of differences between how she is raising her son and how she was raised, and to remind herself that she is a competent and confident parent.  She said she would do so.  Therapist gave feedback on parenting giving analogies on how difficult it is to raise teenagers, and that she should not expect perfection from herself in her parenting.    During this session, this clinician used the following therapeutic modalities: Client-centered Therapy and EMDR (or other form of bilateral Stimulation)    Substance Abuse was not addressed during this session. If the client is diagnosed with a co-occurring substance use disorder, please indicate any changes in the frequency or amount of use: . Stage of change for addressing substance use diagnoses: No substance use/Not " "applicable    ASSESSMENT:  Karine presents with a Euthymic/ normal mood. Karine's affect is Normal range and intensity, which is congruent, with their mood and the content of the session. The client has made progress on their goals as evidenced by identifying memories and triggers from her childhood in how she was raised.    Karine presents with a none risk of suicide, none risk of self-harm, and none risk of harm to others.    For any risk assessment that surpasses a \"low\" rating, a safety plan must be developed.    A safety plan was indicated: no  If yes, describe in detail     PLAN: Between sessions, Karine will utilize positive and balancing self talk when she is upset with herself over how her parenting is going. At the next session, the therapist will use Client-centered Therapy, Cognitive Behavioral Therapy, and EMDR (or other form of bilateral Stimulation) to address parenting concerns.    Behavioral Health Treatment Plan [facility]: Diagnosis and Treatment Plan explained to Karine, Karine relates understanding diagnosis and is agreeable to Treatment Plan. Yes     Depression Follow-up Plan Completed: Not applicable     Reason for visit is No chief complaint on file.     Recent Visits  No visits were found meeting these conditions.  Showing recent visits within past 7 days and meeting all other requirements  Today's Visits  Date Type Provider Dept   06/18/25 Telemedicine Cherie Finnegan LCSW [facility]   Showing today's visits and meeting all other requirements  Future Appointments  No visits were found meeting these conditions.  Showing future appointments within next 150 days and meeting all other requirements     History of Present Illness     HPI    Past Medical History   Past Medical History[1]  Past Surgical History[2]  Current Outpatient Medications   Medication Instructions   • carbamide peroxide (Debrox) 6.5 % otic solution No dose, route, or frequency recorded.   • " fluticasone (FLONASE) 50 mcg/act nasal spray 1 spray, Daily   • Levonorgestrel (KYLEENA IU) by Intrauterine route   • loratadine (Alavert) 10 MG dissolvable tablet No dose, route, or frequency recorded.   • triamcinolone (KENALOG) 0.1 % cream Topical, 2 times daily   • triamcinolone (KENALOG) 0.147 MG/GM topical spray Topical, 2 times daily     Allergies[3]    Objective   There were no vitals taken for this visit.    Video Exam  Physical Exam     Administrative Statements   Encounter provider Cherie Finnegan LCSW    The Patient is located at Home and in the following state in which I hold an active license [place].    The patient was identified by name and date of birth. Karine Dan was informed that this is a telemedicine visit and that the visit is being conducted through the Epic Embedded platform. She agrees to proceed..  My office door was closed. No one else was in the room.  She acknowledged consent and understanding of privacy and security of the video platform. The patient has agreed to participate and understands they can discontinue the visit at any time.      Visit Time  Start Time: 1800  Stop Time: 1858  Total Visit Time: 58 minutes         [1]  Past Medical History:  Diagnosis Date   • Abnormal Pap smear of cervix 2004   • Acne Teenage years   • Addiction to drug (HCC)     no addiction problems   • Allergic     Seasonal   • Allergic rhinitis     last assessed-12/31/2012   • Anemia    • Anxiety     Periodically throughout [facility]   • BRCA1 negative 2/25/2024   • BRCA2 negative 2/25/2024   • Chronic pain syndrome 06/11/2020   • SANDEEP III (cervical intraepithelial neoplasia grade III) with severe dysplasia     LEEP cone biopsy for SANDEEP-3 2/25/04.  Done at the hospital   • Eczema    • GERD (gastroesophageal reflux disease)     Periodically based on foods eaten and how quickly, managed with antacids   • Gestational diabetes     antepartum condition or prior complicated delivery   • Hallucination     No  hallucinations   • Impacted cerumen of both ears     last assessed-2012   • Low back pain    • Oligomenorrhea    • Panic attack     No panic attacks   • Skin tag    • Sleep difficulties     No trouble sleeping   • Spinal stenosis 2018   • Spinal stenosis of lumbar region without neurogenic claudication 2019   • Vaginal candidiasis     last assessed-2013   • Varicella     chickenpox as a child   • Vertigo     last assessed-2013   • Visual impairment     Wear reading glasses   [2]  Past Surgical History:  Procedure Laterality Date   • CERVICAL BIOPSY  W/ LOOP ELECTRODE EXCISION  2004    Pathololgy with SANDEEP-2 to 3, with free margins but dysplasia extended to within 1mm of endocervical margin.  2004   •  SECTION, LOW TRANSVERSE  2009    Primary low transverse  for arrest in the active phase at 4 cm with unengaged vertex despite hours of labor   • COLPOSCOPY W/ BIOPSY / CURETTAGE  2003   • DENTAL SURGERY      [place] teeth extraction   • EPIDURAL BLOCK INJECTION N/A 2020    Procedure: L4-L5 LUMBAR EPIDURAL STEROID INJECTION (41025);  Surgeon: Elke Aguilar MD;  Location: [facility] MAIN OR;  Service: Pain Management    • FOOT SURGERY Right 2015    Bunionectomy on right foot   • ORTHOPEDIC SURGERY  2015    Bunionectomy   • SKIN BIOPSY      On shoulder   • TOOTH EXTRACTION     [3]  No Known Allergies

## 2025-06-20 ENCOUNTER — CLINICAL SUPPORT (OUTPATIENT)
Dept: DERMATOLOGY | Facility: CLINIC | Age: 45
End: 2025-06-20

## 2025-06-20 DIAGNOSIS — Z48.02 ENCOUNTER FOR REMOVAL OF SUTURES: Primary | ICD-10-CM

## 2025-06-20 PROCEDURE — 99024 POSTOP FOLLOW-UP VISIT: CPT | Performed by: DERMATOLOGY

## 2025-06-20 NOTE — PROGRESS NOTES
"Suture removal    Date/Time: 6/20/2025 3:30 PM    Performed by: Clara Isabel RN  Authorized by: Grace Keith MD    Universal Protocol:  procedure performed by consultantConsent: Verbal consent obtained. Written consent not obtained  Risks and benefits: risks, benefits and alternatives were discussed  Consent given by: patient  Time out: Immediately prior to procedure a \"time out\" was called to verify the correct patient, procedure, equipment, support staff and site/side marked as required.  Timeout called at: 6/20/2025 3:07 PM.  Patient understanding: patient states understanding of the procedure being performed  Patient consent: the patient's understanding of the procedure matches consent given  Procedure consent: procedure consent matches procedure scheduled  Relevant documents: relevant documents not present or verified  Test results: test results not available  Site marked: the operative site was not marked  Radiology Images displayed and confirmed. If images not available, report reviewed: imaging studies not available  Patient identity confirmed: verbally with patient      Patient location:  Clinic  Location:     Laterality:  Right    Location:  Upper extremity    Upper extremity location:  Hand    Hand location:  R hand (dorsal)  Procedure details:     Tools used:  Suture removal kit    Wound appearance:  No sign(s) of infection, good wound healing, clean, moist and pink  Post-procedure details:     Post-removal:  Band-Aid applied (Vaseline applied)    Patient tolerance of procedure:  Tolerated well, no immediate complications      "

## 2025-06-23 ENCOUNTER — PATIENT MESSAGE (OUTPATIENT)
Dept: DERMATOLOGY | Facility: CLINIC | Age: 45
End: 2025-06-23

## 2025-07-16 ENCOUNTER — TELEMEDICINE (OUTPATIENT)
Dept: PSYCHIATRY | Facility: CLINIC | Age: 45
End: 2025-07-16
Payer: COMMERCIAL

## 2025-07-16 DIAGNOSIS — F43.22 ADJUSTMENT DISORDER WITH ANXIOUS MOOD: Primary | ICD-10-CM

## 2025-07-16 PROCEDURE — 90834 PSYTX W PT 45 MINUTES: CPT

## 2025-07-16 NOTE — PSYCH
"Virtual Regular VisitName: Karine Dan      : 1980      MRN: [id number]  Encounter Provider: Cherie Finnegan LCSW  Encounter Date: 2025   Encounter department: [facility]  :  Assessment & Plan  Adjustment disorder with anxious mood             Goals addressed in session: Goal 1     DATA: Karine shared she has been feeling intensely anxious lately and it comes in waves.  She shared that she usually has some anxious thoughts about her son that go along with the anxiety, mostly catastrophizing that something terrible will happen to him.  Therapist encouraged her to think about decatastrophizing these thoughts and to talk herself down from anxiety when she needs to get into the car with him when he is driving.  She shared some thoughts and feelings about possibly going on medication for anxiety as well.  Therapist encouraged open communication.  During this session, this clinician used the following therapeutic modalities: Client-centered Therapy and Cognitive Behavioral Therapy    Substance Abuse was not addressed during this session. If the client is diagnosed with a co-occurring substance use disorder, please indicate any changes in the frequency or amount of use: . Stage of change for addressing substance use diagnoses: No substance use/Not applicable    ASSESSMENT:  Karine presents with a Euthymic/ normal mood. Jennys affect is Normal range and intensity, which is congruent, with their mood and the content of the session. The client has made progress on their goals as evidenced by balancing thinking.    Karine presents with a none risk of suicide, none risk of self-harm, and none risk of harm to others.    For any risk assessment that surpasses a \"low\" rating, a safety plan must be developed.    A safety plan was indicated: no  If yes, describe in detail     PLAN: Between sessions, Karine will balance out catastrophic thinking. At the next session, the " therapist will use Client-centered Therapy and Cognitive Behavioral Therapy to address anxiety, adjustment disorder.    Behavioral Health Treatment Plan  [facility]: Diagnosis and Treatment Plan explained to Karime Miltone relates understanding diagnosis and is agreeable to Treatment Plan. Yes     Depression Follow-up Plan Completed: Not applicable     Reason for visit is   Chief Complaint   Patient presents with   • Virtual Regular Visit      Recent Visits  No visits were found meeting these conditions.  Showing recent visits within past 7 days and meeting all other requirements  Today's Visits  Date Type Provider Dept   07/16/25 Telemedicine Cherie Finnegan LCSW [facility]   Showing today's visits and meeting all other requirements  Future Appointments  No visits were found meeting these conditions.  Showing future appointments within next 150 days and meeting all other requirements     History of Present Illness     HPI    Past Medical History   Past Medical History[1]  Past Surgical History[2]  Current Outpatient Medications   Medication Instructions   • carbamide peroxide (Debrox) 6.5 % otic solution No dose, route, or frequency recorded.   • fluticasone (FLONASE) 50 mcg/act nasal spray 1 spray, Daily   • Levonorgestrel (KYLEENA IU) by Intrauterine route   • loratadine (Alavert) 10 MG dissolvable tablet No dose, route, or frequency recorded.   • triamcinolone (KENALOG) 0.1 % cream Topical, 2 times daily   • triamcinolone (KENALOG) 0.147 MG/GM topical spray Topical, 2 times daily     Allergies[3]    Objective   There were no vitals taken for this visit.    Video Exam  Physical Exam     Administrative Statements   Encounter provider Cherie Finnegan LCSW    The Patient is located at Home and in the following state in which I hold an active license [place].    The patient was identified by name and date of birth. Karine WRIGHT Sy was informed that this is a telemedicine visit and that the  visit is being conducted through the FL[employer] platform. She agrees to proceed..  My office door was closed. No one else was in the room.  She acknowledged consent and understanding of privacy and security of the video platform. The patient has agreed to participate and understands they can discontinue the visit at any time.        Visit Time  Start Time:   Stop Time:   Total Visit Time: 52 minutes         [1]  Past Medical History:  Diagnosis Date   • Abnormal Pap smear of cervix    • Acne Teenage years   • Addiction to drug (HCC)     no addiction problems   • Allergic     Seasonal   • Allergic rhinitis     last assessed-2012   • Anemia    • Anxiety     Periodically throughout [facility]   • BRCA1 negative 2024   • BRCA2 negative 2024   • Chronic pain syndrome 2020   • SANDEEP III (cervical intraepithelial neoplasia grade III) with severe dysplasia     LEEP cone biopsy for SANDEEP-3 04.  Done at the hospital   • Eczema    • GERD (gastroesophageal reflux disease)     Periodically based on foods eaten and how quickly, managed with antacids   • Gestational diabetes     antepartum condition or prior complicated delivery   • Hallucination     No hallucinations   • Impacted cerumen of both ears     last assessed-2012   • Low back pain    • Oligomenorrhea    • Panic attack     No panic attacks   • Skin tag    • Sleep difficulties     No trouble sleeping   • Spinal stenosis 2018   • Spinal stenosis of lumbar region without neurogenic claudication 2019   • Vaginal candidiasis     last assessed-2013   • Varicella     chickenpox as a child   • Vertigo     last assessed-2013   • Visual impairment     Wear reading glasses   [2]  Past Surgical History:  Procedure Laterality Date   • CERVICAL BIOPSY  W/ LOOP ELECTRODE EXCISION  2004    Pathololgy with SANDEEP-2 to 3, with free margins but dysplasia extended to within 1mm of endocervical margin.  2004   •   SECTION, LOW TRANSVERSE  2009    Primary low transverse  for arrest in the active phase at 4 cm with unengaged vertex despite hours of labor   • COLPOSCOPY W/ BIOPSY / CURETTAGE  2003   • DENTAL SURGERY      [place] teeth extraction   • EPIDURAL BLOCK INJECTION N/A 2020    Procedure: L4-L5 LUMBAR EPIDURAL STEROID INJECTION (99809);  Surgeon: Elke Aguilar MD;  Location: [facility] MAIN OR;  Service: Pain Management    • FOOT SURGERY Right 2015    Bunionectomy on right foot   • ORTHOPEDIC SURGERY  2015    Bunionectomy   • SKIN BIOPSY      On shoulder   • TOOTH EXTRACTION     [3]  No Known Allergies

## 2025-07-24 DIAGNOSIS — E66.811 CLASS 1 OBESITY DUE TO EXCESS CALORIES WITHOUT SERIOUS COMORBIDITY WITH BODY MASS INDEX (BMI) OF 31.0 TO 31.9 IN ADULT: Primary | ICD-10-CM

## 2025-07-24 DIAGNOSIS — E66.09 CLASS 1 OBESITY DUE TO EXCESS CALORIES WITHOUT SERIOUS COMORBIDITY WITH BODY MASS INDEX (BMI) OF 31.0 TO 31.9 IN ADULT: Primary | ICD-10-CM

## 2025-07-24 RX ORDER — BUPROPION HYDROCHLORIDE 150 MG/1
TABLET ORAL
Qty: 60 TABLET | Refills: 2 | Status: SHIPPED | OUTPATIENT
Start: 2025-07-24

## 2025-07-24 RX ORDER — NALTREXONE HYDROCHLORIDE 50 MG/1
TABLET, FILM COATED ORAL
Qty: 30 TABLET | Refills: 2 | Status: SHIPPED | OUTPATIENT
Start: 2025-07-24

## 2025-07-29 DIAGNOSIS — E66.811 CLASS 1 OBESITY DUE TO EXCESS CALORIES WITHOUT SERIOUS COMORBIDITY WITH BODY MASS INDEX (BMI) OF 31.0 TO 31.9 IN ADULT: ICD-10-CM

## 2025-07-29 DIAGNOSIS — E66.09 CLASS 1 OBESITY DUE TO EXCESS CALORIES WITHOUT SERIOUS COMORBIDITY WITH BODY MASS INDEX (BMI) OF 31.0 TO 31.9 IN ADULT: ICD-10-CM

## 2025-07-29 RX ORDER — BUPROPION HYDROCHLORIDE 150 MG/1
TABLET ORAL
Qty: 60 TABLET | Refills: 2 | Status: SHIPPED | OUTPATIENT
Start: 2025-07-29

## 2025-07-30 ENCOUNTER — TELEMEDICINE (OUTPATIENT)
Dept: PSYCHIATRY | Facility: CLINIC | Age: 45
End: 2025-07-30
Payer: COMMERCIAL

## 2025-07-30 DIAGNOSIS — F43.23 ADJUSTMENT DISORDER WITH MIXED ANXIETY AND DEPRESSED MOOD: Primary | ICD-10-CM

## 2025-07-30 PROCEDURE — 90837 PSYTX W PT 60 MINUTES: CPT

## 2025-08-01 ENCOUNTER — TELEPHONE (OUTPATIENT)
Dept: BARIATRICS | Facility: CLINIC | Age: 45
End: 2025-08-01

## 2025-08-18 ENCOUNTER — HOSPITAL ENCOUNTER (OUTPATIENT)
Dept: RADIOLOGY | Facility: IMAGING CENTER | Age: 45
Discharge: HOME | End: 2025-08-18
Payer: COMMERCIAL

## 2025-08-18 VITALS — WEIGHT: 178 LBS | BODY MASS INDEX: 30.39 KG/M2 | HEIGHT: 64 IN

## 2025-08-18 DIAGNOSIS — Z12.31 ENCOUNTER FOR SCREENING MAMMOGRAM FOR BREAST CANCER: ICD-10-CM

## 2025-08-18 PROCEDURE — 77067 SCR MAMMO BI INCL CAD: CPT

## 2025-08-18 PROCEDURE — 77063 BREAST TOMOSYNTHESIS BI: CPT

## 2025-08-26 PROBLEM — M19.031 LOCALIZED PRIMARY OSTEOARTHRITIS OF CARPOMETACARPAL (CMC) JOINT OF RIGHT WRIST: Status: ACTIVE | Noted: 2025-08-26

## 2025-08-26 PROBLEM — S69.91XA: Status: ACTIVE | Noted: 2025-08-26

## 2025-08-26 PROBLEM — M25.531 PAIN IN RIGHT WRIST: Status: ACTIVE | Noted: 2025-08-26

## (undated) DEVICE — CHLORAPREP APPLICATOR TINTED 10.5ML ONE-STEP

## (undated) DEVICE — NEEDLE BLUNT 18 G X 1 1/2 W FILTER

## (undated) DEVICE — SMALL NEEDLE COUNTER NEST

## (undated) DEVICE — TOWEL SET X-RAY

## (undated) DEVICE — PLASTIC ADHESIVE BANDAGE: Brand: CURITY

## (undated) DEVICE — SYRINGE 5ML LL

## (undated) DEVICE — BRACHIAL PLEXUS SET

## (undated) DEVICE — TRAY EPIDURAL PERIFIX 20GA X 3.5IN TUOHY 8ML

## (undated) DEVICE — RADIOLOGY STERILE LABELS: Brand: CENTURION

## (undated) DEVICE — NEEDLE TUOHY 20G X 3.5 IN WINGED

## (undated) DEVICE — GLOVE SRG BIOGEL 7.5